# Patient Record
Sex: MALE | Race: WHITE | Employment: OTHER | URBAN - METROPOLITAN AREA
[De-identification: names, ages, dates, MRNs, and addresses within clinical notes are randomized per-mention and may not be internally consistent; named-entity substitution may affect disease eponyms.]

---

## 2017-01-09 ENCOUNTER — OFFICE VISIT (OUTPATIENT)
Dept: UROLOGY | Age: 72
End: 2017-01-09

## 2017-01-09 VITALS
TEMPERATURE: 97.9 F | SYSTOLIC BLOOD PRESSURE: 137 MMHG | DIASTOLIC BLOOD PRESSURE: 70 MMHG | OXYGEN SATURATION: 92 % | HEART RATE: 73 BPM | BODY MASS INDEX: 25.06 KG/M2 | WEIGHT: 185 LBS | HEIGHT: 72 IN

## 2017-01-09 DIAGNOSIS — C67.9 MALIGNANT NEOPLASM OF URINARY BLADDER, UNSPECIFIED SITE (HCC): Primary | ICD-10-CM

## 2017-01-09 NOTE — PROGRESS NOTES
[address]    Chief Complaint   Patient presents with   [facility] Pre-op Exam    Bladder Cancer       History and Physical    The patient is a 67-year-old male  with a history of non-muscle invasive bladder cancer who has had a single distant protocol of BCG 6 instillations and recently had another attempt to do a six-week protocol to be followed by maintenance. The patient developed significant irritative symptoms suggestive of cystitis and a surveillance cystoscopy showed a solitary lesion in the dome of the bladder with a bladder wash of this area showing tumor cells. The patient comes now for excisional biopsy of that area plus random biopsies to assess for any changes of carcinoma in situ. We have yet to decide on whether to attempt another BCG protocol    Past Medical History   Diagnosis Date    Elevated prostate specific antigen (PSA)     Essential hypertension     Hiatal hernia     Nodular prostate      Patient Active Problem List   Diagnosis Code    Nodular prostate 600.1    Elevated prostate specific antigen (PSA) R97.20    Bladder tumor D49.4     Past Surgical History   Procedure Laterality Date    Hx retinal detachment repair      Pr prostate biopsy, needle, saturation sampling      Hx other surgical       MELANOMA EXCISION FROM THE NOSE    Hx urological  2/2016     Cystoscopy-bladder tumor removal     Current Outpatient Prescriptions   Medication Sig Dispense Refill    finasteride (PROSCAR) 5 mg tablet Take 1 Tab by mouth daily. 90 Tab 3    tamsulosin (FLOMAX) 0.4 mg capsule One cap q d pc 90 Cap 3    latanoprost (XALATAN) 0.005 % ophthalmic solution Administer 1 Drop to both eyes nightly.  omeprazole (PRILOSEC) 10 mg capsule Take 10 mg by mouth daily.  simvastatin (ZOCOR) 10 mg tablet Take  by mouth nightly.  lisinopril (PRINIVIL, ZESTRIL) 5 mg tablet Take  by mouth daily.       cephALEXin (KEFLEX) 500 mg capsule One tid aqs directed x 2 d before treatment 6 Cap 0  HYDROcodone-acetaminophen (NORCO)  mg tablet Take 1 Tab by mouth every six (6) hours as needed for Pain. Max Daily Amount: 4 Tabs. 40 Tab 0     No Known Allergies  Social History     Social History    Marital status:      Spouse name: N/A    Number of children: N/A    Years of education: N/A     Occupational History    Not on file. Social History Main Topics    Smoking status: Never Smoker    Smokeless tobacco: Never Used    Alcohol use Yes      Comment: OCASSIONAL-WINE    Drug use: No    Sexual activity: Yes     Other Topics Concern    Not on file     Social History Narrative      Family History   Problem Relation Age of Onset    Hypertension Mother     Cancer Father              Visit Vitals    /70 (BP 1 Location: Left arm, BP Patient Position: Sitting)    Pulse 73    Temp 97.9 °F (36.6 °C) (Oral)    Ht 6' (1.829 m)    Wt 185 lb (83.9 kg)    SpO2 92%    BMI 25.09 kg/m2     Physical        Gen: WDWN adult NAD  Head  : normocephalic,  Normal ROM; eyes without normal pupils, EOMs, no masses;  conjunctiva normal  Neck: normal movement,  no evident mass,  No evident adenopathy, trachea midline,  Lungs clear to auscultation with no rales or ronchi or rubs  Cardiac NSR with no murmur, rub, extra sounds  Abd :bowel sounds normal, no masses, tenderness, organomegaly  Flanks     -    Extremities- no edema, arthritis, deformity, swelling  Psych- oriented, no evident anxiety, no cognitive impairment evident     urine shows trace blood                  Impression/ PLAN  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 We will also give mitomycin-C postprocedural.  The patient wife are again fully counseled regarding the preparation technique convalescence and risks.             This visit exceeded 40 minutes and >50% was counselling  The patient understands the discussion and plan    PLEASE NOTE:      This document has been produced using voice recognition software.   Unrecognized errors in transcription may be present    Nav Cruz MD

## 2017-01-09 NOTE — PROGRESS NOTES
Mr. Juan A Randhawa has a reminder for a \"due or due soon\" health maintenance. I have asked that he contact his primary care provider for follow-up on this health maintenance.

## 2017-01-09 NOTE — MR AVS SNAPSHOT
Visit Information Date & Time Provider Department Dept. Phone Encounter #  
 1/9/2017  1:30 PM Álvaro Brown [facility] [phone] [id number] Your Appointments 1/13/2017  2:45 PM  
Office Visit with Marlin Godwin MD  
[facility] Appt Note: PO TURBT  
 [address] [phone]  
[phone] [address] [address]  
  
    
 2/6/2017  9:15 AM  
PROCEDURE with Marlin Godwin MD  
[facility] Appt Note: BCG 1/2 dose per Dr Pal Plasencia [address] [phone]  
[phone] [address] [facility] [address] Upcoming Health Maintenance Date Due DTaP/Tdap/Td series (1 - Tdap) 4/23/1965 FOBT Q 1 YEAR AGE 50-75 4/23/1994 ZOSTER VACCINE AGE 60> 4/23/2004 GLAUCOMA SCREENING Q2Y 4/23/2009 Pneumococcal 65+ High/Highest Risk (1 of 2 - PCV13) 4/23/2009 MEDICARE YEARLY EXAM 4/23/2009 INFLUENZA AGE 9 TO ADULT 8/1/2016 Allergies as of 1/9/2017  Review Complete On: 1/9/2017 By: Marlin Godwin MD  
 No Known Allergies Current Immunizations  Never Reviewed No immunizations on file. Not reviewed this visit You Were Diagnosed With   
  
 Codes Comments Malignant neoplasm of urinary bladder, unspecified site [facility])    -  Primary ICD-10-CM: C67.9 ICD-9-CM: 188. 9 Vitals BP Pulse Temp Height(growth percentile) Weight(growth percentile) SpO2  
 137/70 (BP 1 Location: Left arm, BP Patient Position: Sitting) 73 97.9 °F (36.6 °C) (Oral) 6' (1.829 m) 185 lb (83.9 kg) 92% BMI Smoking Status 25.09 kg/m2 Never Smoker Vitals History BMI and BSA Data Body Mass Index Body Surface Area 25.09 kg/m 2 2.06 m 2 Preferred Pharmacy Pharmacy Name Phone [facility] [address], [address] [phone] Your Updated Medication List  
  
   
This list is accurate as of: 1/9/17  2:35 PM.  Always use your most recent med list.  
  
  
  
  
 cephALEXin 500 mg capsule Commonly known as:  Radha Iron One tid aqs directed x 2 d before treatment  
  
 finasteride 5 mg tablet Commonly known as:  PROSCAR Take 1 Tab by mouth daily. HYDROcodone-acetaminophen  mg tablet Commonly known as:  Lynnetta Villanueva Take 1 Tab by mouth every six (6) hours as needed for Pain. Max Daily Amount: 4 Tabs.  
  
 latanoprost 0.005 % ophthalmic solution Commonly known as:  Isaias Ferrell Administer 1 Drop to both eyes nightly. lisinopril 5 mg tablet Commonly known as:  Marissa Anderson Take  by mouth daily. PriLOSEC 10 mg capsule Generic drug:  omeprazole Take 10 mg by mouth daily. tamsulosin 0.4 mg capsule Commonly known as:  FLOMAX One cap q d pc ZOCOR 10 mg tablet Generic drug:  simvastatin Take  by mouth nightly. We Performed the Following AMB POC URINALYSIS DIP STICK AUTO W/O MICRO [51642 CPT(R)] Patient Instructions Transurethral Resection of the Prostate (TURP): Before Your Surgery What is transurethral resection of the prostate? Transurethral resection of the prostate (TURP) is surgery to take out a part of your prostate gland. It is done when the prostate gets too large. The prostate gland is a small organ just below a man's bladder. It makes most of the fluid in semen. It surrounds the urethra, the tube that carries urine from the bladder out of the body through the penis. Your doctor will give you medicine to make you sleep or feel relaxed. If you are awake during the surgery, you will get medicine to numb you from the chest down. You will not feel pain. The doctor puts a thin, lighted tube into your urethra.  This is called a resectoscope or scope. It goes in through the opening in your penis. Then the doctor puts small surgical tools through the scope. These are used to remove the part of the prostate that is blocking urine flow. When the doctor is finished, he or she takes out the scope. This surgery may make it easier for you to urinate. You may have better control when you start and stop your urine stream. And you may feel like you get more relief when you urinate. Most men go home from the hospital 1 or 2 days after surgery. You may be able to go back to work or most of your usual routine in 1 to 3 weeks. But for about 6 weeks, you will need to avoid heavy lifting and activities that might put extra pressure on your bladder. Follow-up care is a key part of your treatment and safety. Be sure to make and go to all appointments, and call your doctor if you are having problems. It's also a good idea to know your test results and keep a list of the medicines you take. What happens before surgery? Surgery can be stressful. This information will help you understand what you can expect. And it will help you safely prepare for your surgery. Preparing for surgery · Understand exactly what surgery is planned, along with the risks, benefits, and other options. · Tell your doctors ALL the medicines, vitamins, supplements, and herbal remedies you take. Some of these can increase the risk of bleeding or interact with anesthesia. · If you take blood thinners, such as warfarin (Coumadin), clopidogrel (Plavix), or aspirin, be sure to talk to your doctor. He or she will tell you if you should stop taking these medicines before your surgery. Make sure that you understand exactly what your doctor wants you to do. · Your doctor will tell you which medicines to take or stop before your surgery. You may need to stop taking certain medicines a week or more before surgery. So talk to your doctor as soon as you can. · If you have an advance directive, let your doctor know. It may include a living will and a durable power of  for health care. Bring a copy to the hospital. If you don't have one, you may want to prepare one. It lets your doctor and loved ones know your health care wishes. Doctors advise that everyone prepare these papers before any type of surgery or procedure. · You may need to empty your bowels with an enema or laxative. Your doctor will tell you how to do this. What happens on the day of surgery? · Follow the instructions exactly about when to stop eating and drinking. If you don't, your surgery may be canceled. If your doctor told you to take your medicines on the day of surgery, take them with only a sip of water. · Take a bath or shower before you come in for your surgery. Do not apply lotions, perfumes, deodorants, or nail polish. · Do not shave the surgical site yourself. · Take off all jewelry and piercings. And take out contact lenses, if you wear them. At the hospital or surgery center · Bring a picture ID. · The area for surgery is often marked to make sure there are no errors. · You will be kept comfortable and safe by your anesthesia provider. The anesthesia may make you sleep. Or it may just numb the area being worked on. · The surgery will take 1 to 2 hours. Going home · Be sure you have someone to drive you home. Anesthesia and pain medicine make it unsafe for you to drive. · You will be given more specific instructions about recovering from your surgery. They will cover things like diet, wound care, follow-up care, driving, and getting back to your normal routine. · You may go home with a urinary catheter in place. A urinary catheter is a flexible plastic tube used to drain urine from your bladder when you cannot urinate on your own. Your doctor will give you instructions about how to care for your catheter and when it can be removed. When should you call your doctor? · You have questions or concerns. · You don't understand how to prepare for your surgery. · You become ill before the surgery (such as fever, flu, or a cold). · You need to reschedule or have changed your mind about having the surgery. Where can you learn more? Go to http://good-jerod.info/. Enter M968 in the search box to learn more about \"Transurethral Resection of the Prostate (TURP): Before Your Surgery. \" Current as of: May 24, 2016 Content Version: 11.1 © 5490-5122 [employer]. Care instructions adapted under license by [employer] (which disclaims liability or warranty for this information). If you have questions about a medical condition or this instruction, always ask your healthcare professional. [place] [address] any warranty or liability for your use of this information. Bladder Cancer: Care Instructions Your Care Instructions Bladder cancer occurs when abnormal cells grow out of control in the bladder. It usually can be cured when it is found early. It is more common in older people. Treatment may include surgery to remove part of the bladder. If the tumor is large, the entire bladder may be removed. You may also have radiation or chemotherapy to kill the cancer cells. Sometimes people get treatment with medicines that help the body's natural defenses, or immune system, fight the cancer. Finding out that you have cancer is scary. You may feel many emotions and may need some help coping. Seek out family, friends, and counselors for support. You also can do things at home to make yourself feel better while you go through treatment. Call the "[employer] (6-263.964.4604) or visit its website at [address] [employer] for more information. Follow-up care is a key part of your treatment and safety.  Be sure to make and go to all appointments, and call your doctor if you are having problems. It's also a good idea to know your test results and keep a list of the medicines you take. How can you care for yourself at home? · Take your medicines exactly as prescribed. Call your doctor if you think you are having a problem with your medicine. You may get medicine for nausea and vomiting if you have these side effects. · Eat healthy food. If you are not hungry, try to eat food that has protein and extra calories to keep up your strength and prevent weight loss. Drink liquid meal replacements for extra calories and protein. Try to eat your main meal early. · Get some physical activity every day, but do not get too tired. Keep doing the hobbies you enjoy as your energy allows. · Take steps to control your stress and workload. Learn relaxation techniques. ¨ Share your feelings. Stress and tension affect our emotions. By expressing your feelings to others, you may be able to understand and cope with them. ¨ Consider joining a support group. Talking about a problem with your spouse, a good friend, or other people with similar problems is a good way to reduce tension and stress. ¨ Express yourself through art. Try writing, dance, art, or crafts to relieve tension. Some dance, writing, or art groups may be available just for people who have cancer. ¨ Be kind to your body and mind. Getting enough sleep, eating a healthy diet, and taking time to do things you enjoy can contribute to an overall feeling of balance in your life and help reduce stress. ¨ Get help if you need it. Discuss your concerns with your doctor or counselor. · If you are vomiting or have diarrhea: ¨ Drink plenty of fluids (enough so that your urine is light yellow or clear like water) to prevent dehydration. Choose water and other caffeine-free clear liquids. If you have kidney, heart, or liver disease and have to limit fluids, talk with your doctor before you increase the amount of fluids you drink. ¨ When you are able to eat, try clear soups, mild foods, and liquids until all symptoms are gone for 12 to 48 hours. Other good choices include dry toast, crackers, cooked cereal, and gelatin dessert, such as Jell-O. 
· Take care of your urinary tract to prevent problems such as infection, which can be caused by bladder cancer and its treatment. Limit drinks with caffeine, drink plenty of fluids, and urinate every 3 to 4 hours. · If you have not already done so, prepare a list of advance directives. Advance directives are instructions to your doctor and family members about what kind of care you want if you become unable to speak for yourself. When should you call for help? Call 911 anytime you think you may need emergency care. For example, call if: 
· You passed out (lost consciousness). · You have severe belly pain. Call your doctor now or seek immediate medical care if: · Vomiting lasts longer than 24 hours and you are not able to keep down fluids. · You have symptoms of a urinary infection. For example: ¨ You have blood or pus in your urine. ¨ You have pain in your back just below your rib cage. This is called flank pain. ¨ You have a fever, chills, or body aches. ¨ It hurts to urinate. ¨ You have groin or belly pain. Watch closely for changes in your health, and be sure to contact your doctor if: 
· You are not able to eat well and are losing weight. · You feel more tired than usual. 
Where can you learn more? Go to http://good-jerod.info/. Enter M796 in the search box to learn more about \"Bladder Cancer: Care Instructions. \" Current as of: July 26, 2016 Content Version: 11.1 © 4525-4381 [employer]. Care instructions adapted under license by [employer] (which disclaims liability or warranty for this information).  If you have questions about a medical condition or this instruction, always ask your healthcare professional. [employer] disclaims any warranty or liability for your use of this information. Patient Instructions History Please provide this summary of care documentation to your next provider. Your primary care clinician is listed as Celio Guzman. If you have any questions after today's visit, please call [phone].

## 2017-01-09 NOTE — PATIENT INSTRUCTIONS
Transurethral Resection of the Prostate (TURP): Before Your Surgery  What is transurethral resection of the prostate? Transurethral resection of the prostate (TURP) is surgery to take out a part of your prostate gland. It is done when the prostate gets too large. The prostate gland is a small organ just below a man's bladder. It makes most of the fluid in semen. It surrounds the urethra, the tube that carries urine from the bladder out of the body through the penis. Your doctor will give you medicine to make you sleep or feel relaxed. If you are awake during the surgery, you will get medicine to numb you from the chest down. You will not feel pain. The doctor puts a thin, lighted tube into your urethra. This is called a resectoscope or scope. It goes in through the opening in your penis. Then the doctor puts small surgical tools through the scope. These are used to remove the part of the prostate that is blocking urine flow. When the doctor is finished, he or she takes out the scope. This surgery may make it easier for you to urinate. You may have better control when you start and stop your urine stream. And you may feel like you get more relief when you urinate. Most men go home from the hospital 1 or 2 days after surgery. You may be able to go back to work or most of your usual routine in 1 to 3 weeks. But for about 6 weeks, you will need to avoid heavy lifting and activities that might put extra pressure on your bladder. Follow-up care is a key part of your treatment and safety. Be sure to make and go to all appointments, and call your doctor if you are having problems. It's also a good idea to know your test results and keep a list of the medicines you take. What happens before surgery? Surgery can be stressful. This information will help you understand what you can expect. And it will help you safely prepare for your surgery.   Preparing for surgery  · Understand exactly what surgery is planned, along with the risks, benefits, and other options. · Tell your doctors ALL the medicines, vitamins, supplements, and herbal remedies you take. Some of these can increase the risk of bleeding or interact with anesthesia. · If you take blood thinners, such as warfarin (Coumadin), clopidogrel (Plavix), or aspirin, be sure to talk to your doctor. He or she will tell you if you should stop taking these medicines before your surgery. Make sure that you understand exactly what your doctor wants you to do. · Your doctor will tell you which medicines to take or stop before your surgery. You may need to stop taking certain medicines a week or more before surgery. So talk to your doctor as soon as you can. · If you have an advance directive, let your doctor know. It may include a living will and a durable power of  for health care. Bring a copy to the hospital. If you don't have one, you may want to prepare one. It lets your doctor and loved ones know your health care wishes. Doctors advise that everyone prepare these papers before any type of surgery or procedure. · You may need to empty your bowels with an enema or laxative. Your doctor will tell you how to do this. What happens on the day of surgery? · Follow the instructions exactly about when to stop eating and drinking. If you don't, your surgery may be canceled. If your doctor told you to take your medicines on the day of surgery, take them with only a sip of water. · Take a bath or shower before you come in for your surgery. Do not apply lotions, perfumes, deodorants, or nail polish. · Do not shave the surgical site yourself. · Take off all jewelry and piercings. And take out contact lenses, if you wear them. At the hospital or surgery center  · Bring a picture ID. · The area for surgery is often marked to make sure there are no errors. · You will be kept comfortable and safe by your anesthesia provider. The anesthesia may make you sleep.  Or it may just numb the area being worked on. · The surgery will take 1 to 2 hours. Going home  · Be sure you have someone to drive you home. Anesthesia and pain medicine make it unsafe for you to drive. · You will be given more specific instructions about recovering from your surgery. They will cover things like diet, wound care, follow-up care, driving, and getting back to your normal routine. · You may go home with a urinary catheter in place. A urinary catheter is a flexible plastic tube used to drain urine from your bladder when you cannot urinate on your own. Your doctor will give you instructions about how to care for your catheter and when it can be removed. When should you call your doctor? · You have questions or concerns. · You don't understand how to prepare for your surgery. · You become ill before the surgery (such as fever, flu, or a cold). · You need to reschedule or have changed your mind about having the surgery. Where can you learn more? Go to http://good-jerod.info/. Enter I386 in the search box to learn more about \"Transurethral Resection of the Prostate (TURP): Before Your Surgery. \"  Current as of: May 24, 2016  Content Version: 11.1  © 0734-7949 "[employer]. Care instructions adapted under license by [employer] (which disclaims liability or warranty for this information). If you have questions about a medical condition or this instruction, always ask your healthcare professional. [place] [address] any warranty or liability for your use of this information. Bladder Cancer: Care Instructions  Your Care Instructions  Bladder cancer occurs when abnormal cells grow out of control in the bladder. It usually can be cured when it is found early. It is more common in older people. Treatment may include surgery to remove part of the bladder. If the tumor is large, the entire bladder may be removed.  You may also have radiation or chemotherapy to kill the cancer cells. Sometimes people get treatment with medicines that help the body's natural defenses, or immune system, fight the cancer. Finding out that you have cancer is scary. You may feel many emotions and may need some help coping. Seek out family, friends, and counselors for support. You also can do things at home to make yourself feel better while you go through treatment. Call the [employer] (2-827.936.9532) or visit its website at [address] for more information. Follow-up care is a key part of your treatment and safety. Be sure to make and go to all appointments, and call your doctor if you are having problems. It's also a good idea to know your test results and keep a list of the medicines you take. How can you care for yourself at home? · Take your medicines exactly as prescribed. Call your doctor if you think you are having a problem with your medicine. You may get medicine for nausea and vomiting if you have these side effects. · Eat healthy food. If you are not hungry, try to eat food that has protein and extra calories to keep up your strength and prevent weight loss. Drink liquid meal replacements for extra calories and protein. Try to eat your main meal early. · Get some physical activity every day, but do not get too tired. Keep doing the hobbies you enjoy as your energy allows. · Take steps to control your stress and workload. Learn relaxation techniques. ¨ Share your feelings. Stress and tension affect our emotions. By expressing your feelings to others, you may be able to understand and cope with them. ¨ Consider joining a support group. Talking about a problem with your spouse, a good friend, or other people with similar problems is a good way to reduce tension and stress. ¨ Express yourself through art. Try writing, dance, art, or crafts to relieve tension.  Some dance, writing, or art groups may be available just for people who have cancer. ¨ Be kind to your body and mind. Getting enough sleep, eating a healthy diet, and taking time to do things you enjoy can contribute to an overall feeling of balance in your life and help reduce stress. ¨ Get help if you need it. Discuss your concerns with your doctor or counselor. · If you are vomiting or have diarrhea:  ¨ Drink plenty of fluids (enough so that your urine is light yellow or clear like water) to prevent dehydration. Choose water and other caffeine-free clear liquids. If you have kidney, heart, or liver disease and have to limit fluids, talk with your doctor before you increase the amount of fluids you drink. ¨ When you are able to eat, try clear soups, mild foods, and liquids until all symptoms are gone for 12 to 48 hours. Other good choices include dry toast, crackers, cooked cereal, and gelatin dessert, such as Jell-O.  · Take care of your urinary tract to prevent problems such as infection, which can be caused by bladder cancer and its treatment. Limit drinks with caffeine, drink plenty of fluids, and urinate every 3 to 4 hours. · If you have not already done so, prepare a list of advance directives. Advance directives are instructions to your doctor and family members about what kind of care you want if you become unable to speak for yourself. When should you call for help? Call 911 anytime you think you may need emergency care. For example, call if:  · You passed out (lost consciousness). · You have severe belly pain. Call your doctor now or seek immediate medical care if:  · Vomiting lasts longer than 24 hours and you are not able to keep down fluids. · You have symptoms of a urinary infection. For example:  ¨ You have blood or pus in your urine. ¨ You have pain in your back just below your rib cage. This is called flank pain. ¨ You have a fever, chills, or body aches. ¨ It hurts to urinate. ¨ You have groin or belly pain.   Watch closely for changes in your health, and be sure to contact your doctor if:  · You are not able to eat well and are losing weight. · You feel more tired than usual.  Where can you learn more? Go to http://good-jerod.info/. Enter M796 in the search box to learn more about \"Bladder Cancer: Care Instructions. \"  Current as of: July 26, 2016  Content Version: 11.1  © 4428-5646 [employer]. Care instructions adapted under license by [employer] (which disclaims liability or warranty for this information). If you have questions about a medical condition or this instruction, always ask your healthcare professional. [place] [address] any warranty or liability for your use of this information.

## 2017-01-10 ENCOUNTER — HOSPITAL ENCOUNTER (OUTPATIENT)
Dept: LAB | Age: 72
Discharge: HOME | End: 2017-01-10
Payer: MEDICARE

## 2017-01-10 ENCOUNTER — ANESTHESIA EVENT (OUTPATIENT)
Dept: SURGERY | Age: 72
End: 2017-01-10
Payer: MEDICARE

## 2017-01-10 LAB
ALBUMIN SERPL BCP-MCNC: 3.8 G/DL (ref 3.4–5)
ALBUMIN/GLOB SERPL: 1.1 {RATIO} (ref 0.8–1.7)
ALP SERPL-CCNC: 87 U/L (ref 45–117)
ALT SERPL-CCNC: 29 U/L (ref 16–61)
ANION GAP BLD CALC-SCNC: 8 MMOL/L (ref 3–18)
AST SERPL W P-5'-P-CCNC: 19 U/L (ref 15–37)
BILIRUB SERPL-MCNC: 0.4 MG/DL (ref 0.2–1)
BILIRUB UR QL STRIP: NEGATIVE
BUN SERPL-MCNC: 21 MG/DL (ref 7–18)
BUN/CREAT SERPL: 16 (ref 12–20)
CALCIUM SERPL-MCNC: 9.2 MG/DL (ref 8.5–10.1)
CHLORIDE SERPL-SCNC: 107 MMOL/L (ref 100–108)
CO2 SERPL-SCNC: 27 MMOL/L (ref 21–32)
CREAT SERPL-MCNC: 1.34 MG/DL (ref 0.6–1.3)
ERYTHROCYTE [DISTWIDTH] IN BLOOD BY AUTOMATED COUNT: 13.5 % (ref 11.6–14.5)
GLOBULIN SER CALC-MCNC: 3.6 G/DL (ref 2–4)
GLUCOSE SERPL-MCNC: 74 MG/DL (ref 74–99)
GLUCOSE UR-MCNC: NEGATIVE MG/DL
HCT VFR BLD AUTO: 44.5 % (ref 36–48)
HGB BLD-MCNC: 14.5 G/DL (ref 13–16)
KETONES P FAST UR STRIP-MCNC: NEGATIVE MG/DL
MCH RBC QN AUTO: 30.5 PG (ref 24–34)
MCHC RBC AUTO-ENTMCNC: 32.6 G/DL (ref 31–37)
MCV RBC AUTO: 93.7 FL (ref 74–97)
PH UR STRIP: 5 [PH] (ref 4.6–8)
PLATELET # BLD AUTO: 183 K/UL (ref 135–420)
PMV BLD AUTO: 11.4 FL (ref 9.2–11.8)
POTASSIUM SERPL-SCNC: 4.1 MMOL/L (ref 3.5–5.5)
PROT SERPL-MCNC: 7.4 G/DL (ref 6.4–8.2)
PROT UR QL STRIP: NEGATIVE MG/DL
RBC # BLD AUTO: 4.75 M/UL (ref 4.7–5.5)
SODIUM SERPL-SCNC: 142 MMOL/L (ref 136–145)
SP GR UR STRIP: 1.02 (ref 1–1.03)
UA UROBILINOGEN AMB POC: NORMAL (ref 0.2–1)
URINALYSIS CLARITY POC: CLEAR
URINALYSIS COLOR POC: YELLOW
URINE BLOOD POC: NORMAL
URINE LEUKOCYTES POC: NEGATIVE
URINE NITRITES POC: NEGATIVE
WBC # BLD AUTO: 7.7 K/UL (ref 4.6–13.2)

## 2017-01-10 PROCEDURE — 85027 COMPLETE CBC AUTOMATED: CPT

## 2017-01-10 PROCEDURE — 80053 COMPREHEN METABOLIC PANEL: CPT

## 2017-01-10 PROCEDURE — 36415 COLL VENOUS BLD VENIPUNCTURE: CPT

## 2017-01-10 RX ORDER — SODIUM CHLORIDE 9 MG/ML
100 INJECTION, SOLUTION INTRAVENOUS CONTINUOUS
Status: CANCELLED | OUTPATIENT
Start: 2017-01-10 | End: 2017-01-11

## 2017-01-11 ENCOUNTER — HOSPITAL ENCOUNTER (OUTPATIENT)
Age: 72
Setting detail: OUTPATIENT SURGERY
Discharge: HOME | End: 2017-01-11
Attending: UROLOGY | Admitting: UROLOGY
Payer: MEDICARE

## 2017-01-11 ENCOUNTER — SURGERY (OUTPATIENT)
Age: 72
End: 2017-01-11

## 2017-01-11 ENCOUNTER — ANESTHESIA (OUTPATIENT)
Dept: SURGERY | Age: 72
End: 2017-01-11
Payer: MEDICARE

## 2017-01-11 VITALS
HEIGHT: 72 IN | WEIGHT: 183 LBS | HEART RATE: 62 BPM | TEMPERATURE: 97.9 F | RESPIRATION RATE: 14 BRPM | BODY MASS INDEX: 24.79 KG/M2 | SYSTOLIC BLOOD PRESSURE: 131 MMHG | OXYGEN SATURATION: 99 % | DIASTOLIC BLOOD PRESSURE: 67 MMHG

## 2017-01-11 PROCEDURE — 77030029290 HC ELECTRD LP CUT OCOA -F: Performed by: UROLOGY

## 2017-01-11 PROCEDURE — 77030010509 HC AIRWY LMA MSK TELE -A: Performed by: ANESTHESIOLOGY

## 2017-01-11 PROCEDURE — 74011250636 HC RX REV CODE- 250/636: Performed by: NURSE ANESTHETIST, CERTIFIED REGISTERED

## 2017-01-11 PROCEDURE — 74011000250 HC RX REV CODE- 250

## 2017-01-11 PROCEDURE — 76060000032 HC ANESTHESIA 0.5 TO 1 HR: Performed by: UROLOGY

## 2017-01-11 PROCEDURE — 77030032490 HC SLV COMPR SCD KNE COVD -B: Performed by: UROLOGY

## 2017-01-11 PROCEDURE — 74011250636 HC RX REV CODE- 250/636: Performed by: UROLOGY

## 2017-01-11 PROCEDURE — 74011250637 HC RX REV CODE- 250/637: Performed by: NURSE ANESTHETIST, CERTIFIED REGISTERED

## 2017-01-11 PROCEDURE — 74011000258 HC RX REV CODE- 258: Performed by: UROLOGY

## 2017-01-11 PROCEDURE — 76210000021 HC REC RM PH II 0.5 TO 1 HR: Performed by: UROLOGY

## 2017-01-11 PROCEDURE — 88305 TISSUE EXAM BY PATHOLOGIST: CPT | Performed by: UROLOGY

## 2017-01-11 PROCEDURE — 74011250636 HC RX REV CODE- 250/636

## 2017-01-11 PROCEDURE — 77030020782 HC GWN BAIR PAWS FLX 3M -B: Performed by: UROLOGY

## 2017-01-11 PROCEDURE — 77030018836 HC SOL IRR NACL ICUM -A: Performed by: UROLOGY

## 2017-01-11 PROCEDURE — 76210000016 HC OR PH I REC 1 TO 1.5 HR: Performed by: UROLOGY

## 2017-01-11 PROCEDURE — 77030019927 HC TBNG IRR CYSTO BAXT -A: Performed by: UROLOGY

## 2017-01-11 PROCEDURE — 88307 TISSUE EXAM BY PATHOLOGIST: CPT | Performed by: UROLOGY

## 2017-01-11 PROCEDURE — 76010000138 HC OR TIME 0.5 TO 1 HR: Performed by: UROLOGY

## 2017-01-11 PROCEDURE — 77030020015 HC EVAC BLDR UROVAC BSC -B: Performed by: UROLOGY

## 2017-01-11 PROCEDURE — 77030005520 HC CATH URETH FOL38 BARD -A: Performed by: UROLOGY

## 2017-01-11 RX ORDER — SODIUM CHLORIDE 0.9 % (FLUSH) 0.9 %
5-10 SYRINGE (ML) INJECTION AS NEEDED
Status: DISCONTINUED | OUTPATIENT
Start: 2017-01-11 | End: 2017-01-11 | Stop reason: HOSPADM

## 2017-01-11 RX ORDER — SODIUM CHLORIDE 0.9 % (FLUSH) 0.9 %
5-10 SYRINGE (ML) INJECTION EVERY 8 HOURS
Status: DISCONTINUED | OUTPATIENT
Start: 2017-01-11 | End: 2017-01-11 | Stop reason: HOSPADM

## 2017-01-11 RX ORDER — LIDOCAINE HYDROCHLORIDE 20 MG/ML
INJECTION, SOLUTION EPIDURAL; INFILTRATION; INTRACAUDAL; PERINEURAL AS NEEDED
Status: DISCONTINUED | OUTPATIENT
Start: 2017-01-11 | End: 2017-01-11 | Stop reason: HOSPADM

## 2017-01-11 RX ORDER — ONDANSETRON 2 MG/ML
4 INJECTION INTRAMUSCULAR; INTRAVENOUS ONCE
Status: COMPLETED | OUTPATIENT
Start: 2017-01-11 | End: 2017-01-11

## 2017-01-11 RX ORDER — ONDANSETRON 2 MG/ML
INJECTION INTRAMUSCULAR; INTRAVENOUS AS NEEDED
Status: DISCONTINUED | OUTPATIENT
Start: 2017-01-11 | End: 2017-01-11 | Stop reason: HOSPADM

## 2017-01-11 RX ORDER — MAGNESIUM SULFATE 100 %
4 CRYSTALS MISCELLANEOUS AS NEEDED
Status: DISCONTINUED | OUTPATIENT
Start: 2017-01-11 | End: 2017-01-11 | Stop reason: HOSPADM

## 2017-01-11 RX ORDER — MIDAZOLAM HYDROCHLORIDE 1 MG/ML
INJECTION, SOLUTION INTRAMUSCULAR; INTRAVENOUS AS NEEDED
Status: DISCONTINUED | OUTPATIENT
Start: 2017-01-11 | End: 2017-01-11 | Stop reason: HOSPADM

## 2017-01-11 RX ORDER — SODIUM CHLORIDE, SODIUM LACTATE, POTASSIUM CHLORIDE, CALCIUM CHLORIDE 600; 310; 30; 20 MG/100ML; MG/100ML; MG/100ML; MG/100ML
75 INJECTION, SOLUTION INTRAVENOUS CONTINUOUS
Status: DISCONTINUED | OUTPATIENT
Start: 2017-01-11 | End: 2017-01-11 | Stop reason: HOSPADM

## 2017-01-11 RX ORDER — DEXTROSE 50 % IN WATER (D50W) INTRAVENOUS SYRINGE
25-50 AS NEEDED
Status: DISCONTINUED | OUTPATIENT
Start: 2017-01-11 | End: 2017-01-11 | Stop reason: HOSPADM

## 2017-01-11 RX ORDER — FAMOTIDINE 20 MG/1
20 TABLET, FILM COATED ORAL ONCE
Status: COMPLETED | OUTPATIENT
Start: 2017-01-11 | End: 2017-01-11

## 2017-01-11 RX ORDER — CEFAZOLIN SODIUM 2 G/50ML
2 SOLUTION INTRAVENOUS ONCE
Status: COMPLETED | OUTPATIENT
Start: 2017-01-11 | End: 2017-01-11

## 2017-01-11 RX ORDER — PROPOFOL 10 MG/ML
INJECTION, EMULSION INTRAVENOUS AS NEEDED
Status: DISCONTINUED | OUTPATIENT
Start: 2017-01-11 | End: 2017-01-11 | Stop reason: HOSPADM

## 2017-01-11 RX ORDER — FENTANYL CITRATE 50 UG/ML
INJECTION, SOLUTION INTRAMUSCULAR; INTRAVENOUS AS NEEDED
Status: DISCONTINUED | OUTPATIENT
Start: 2017-01-11 | End: 2017-01-11 | Stop reason: HOSPADM

## 2017-01-11 RX ORDER — SODIUM CHLORIDE 9 MG/ML
100 INJECTION, SOLUTION INTRAVENOUS CONTINUOUS
Status: DISCONTINUED | OUTPATIENT
Start: 2017-01-11 | End: 2017-01-11 | Stop reason: HOSPADM

## 2017-01-11 RX ORDER — HYDROMORPHONE HYDROCHLORIDE 2 MG/ML
0.5 INJECTION, SOLUTION INTRAMUSCULAR; INTRAVENOUS; SUBCUTANEOUS AS NEEDED
Status: DISCONTINUED | OUTPATIENT
Start: 2017-01-11 | End: 2017-01-11 | Stop reason: HOSPADM

## 2017-01-11 RX ADMIN — MIDAZOLAM HYDROCHLORIDE 2 MG: 1 INJECTION, SOLUTION INTRAMUSCULAR; INTRAVENOUS at 11:52

## 2017-01-11 RX ADMIN — CEFAZOLIN SODIUM 2 G: 2 SOLUTION INTRAVENOUS at 11:52

## 2017-01-11 RX ADMIN — ONDANSETRON 4 MG: 2 INJECTION INTRAMUSCULAR; INTRAVENOUS at 13:21

## 2017-01-11 RX ADMIN — ONDANSETRON 4 MG: 2 INJECTION INTRAMUSCULAR; INTRAVENOUS at 12:12

## 2017-01-11 RX ADMIN — LIDOCAINE HYDROCHLORIDE 80 MG: 20 INJECTION, SOLUTION EPIDURAL; INFILTRATION; INTRACAUDAL; PERINEURAL at 11:58

## 2017-01-11 RX ADMIN — FAMOTIDINE 20 MG: 20 TABLET ORAL at 08:31

## 2017-01-11 RX ADMIN — PROPOFOL 150 MG: 10 INJECTION, EMULSION INTRAVENOUS at 11:58

## 2017-01-11 RX ADMIN — FENTANYL CITRATE 50 MCG: 50 INJECTION, SOLUTION INTRAMUSCULAR; INTRAVENOUS at 12:05

## 2017-01-11 RX ADMIN — FENTANYL CITRATE 50 MCG: 50 INJECTION, SOLUTION INTRAMUSCULAR; INTRAVENOUS at 12:00

## 2017-01-11 RX ADMIN — SODIUM CHLORIDE, SODIUM LACTATE, POTASSIUM CHLORIDE, AND CALCIUM CHLORIDE 75 ML/HR: 600; 310; 30; 20 INJECTION, SOLUTION INTRAVENOUS at 08:32

## 2017-01-11 RX ADMIN — HYDROMORPHONE HYDROCHLORIDE 0.5 MG: 2 INJECTION, SOLUTION INTRAMUSCULAR; INTRAVENOUS; SUBCUTANEOUS at 13:30

## 2017-01-11 RX ADMIN — HYDROMORPHONE HYDROCHLORIDE 0.5 MG: 2 INJECTION, SOLUTION INTRAMUSCULAR; INTRAVENOUS; SUBCUTANEOUS at 13:20

## 2017-01-11 RX ADMIN — MITOMYCIN: 20 INJECTION, POWDER, LYOPHILIZED, FOR SOLUTION INTRAVENOUS at 12:34

## 2017-01-11 NOTE — BRIEF OP NOTE
BRIEF OPERATIVE NOTE    Date of Procedure: 1/11/2017   Preoperative Diagnosis: Malignant neoplasm of urinary bladder, unspecified site ([facility] [C67.9]  Postoperative Diagnosis: Malignant neoplasm of urinary bladder, unspecified site ([facility] [C67.9]    Procedure(s):  CYSTOSCOPY TRANSURETHRAL RESECTION OF BLADDER TUMOR  Surgeon(s) and Role:     * Agatha Brown MD - Primary            Surgical Staff:  Circ-1: Nicolas Acuña RN  Circ-Relief: Graham Russo RN  Scrub Tech-1: Sallyann Closs  Event Time In   Incision Start 1210   Incision Close      Anesthesia: General   Estimated Blood Loss: 5cc  Specimens:   ID Type Source Tests Collected by Time Destination   1 : anterior bladder tumor Preservative Bladder  Agatha Brown MD 1/11/2017 1221 Pathology   2 : Trigone Preservative Bladder  Agatha Brown MD 1/11/2017 1228 Pathology   3 : left side bladder Preservative Bladder  Agatha Brown MD 1/11/2017 1228 Pathology   4 : right side bladder Preservative Bladder  Agatha Brown MD 1/11/2017 1233 Pathology      Findings: small anterior dome tumor  Complications:none *  Implants: * No implants in log *

## 2017-01-11 NOTE — ANESTHESIA PREPROCEDURE EVALUATION
Anesthetic History   No history of anesthetic complications            Review of Systems / Medical History  Patient summary reviewed and pertinent labs reviewed    Pulmonary  Within defined limits                 Neuro/Psych   Within defined limits           Cardiovascular    Hypertension              Exercise tolerance: >4 METS     GI/Hepatic/Renal  Within defined limits              Endo/Other  Within defined limits           Other Findings   Comments: Current Smoker? NO       Elective Surgery? Yes       Abstained from smoking 24 hours prior to anesthesia? N/A    Risk Factors for Postoperative nausea/vomiting:       History of postoperative nausea/vomiting? NO       Female? NO       Motion sickness? NO       Intended opioid administration for postoperative analgesia?   NO           Physical Exam    Airway  Mallampati: II  TM Distance: 4 - 6 cm  Neck ROM: normal range of motion   Mouth opening: Normal     Cardiovascular  Regular rate and rhythm,  S1 and S2 normal,  no murmur, click, rub, or gallop             Dental  No notable dental hx       Pulmonary  Breath sounds clear to auscultation               Abdominal  GI exam deferred       Other Findings            Anesthetic Plan    ASA: 2  Anesthesia type: general          Induction: Intravenous  Anesthetic plan and risks discussed with: Patient

## 2017-01-11 NOTE — IP AVS SNAPSHOT
[address] [place] [id number] [address] Patient: Johanna Jenkins MRN: [id number] You are allergic to the following No active allergies Recent Documentation Height Weight BMI Smoking Status 1.829 m 83 kg 24.82 kg/m2 Never Smoker Emergency Contacts Name Discharge Info Relation Home Work Mobile Darlene Laureano DISCHARGE CAREGIVER [3] Spouse [3] [phone] About your hospitalization You were admitted on:  January 11, 2017 You last received care in the:  [facility] You were discharged on:  January 11, 2017 Unit phone number:  [phone] Why you were hospitalized Your primary diagnosis was:  Not on File Providers Seen During Your Hospitalizations Provider Role Specialty Primary office phone Cheko Valerio MD Attending Provider Urology [phone] Your Primary Care Physician (PCP) Primary Care Physician Office Phone Office Fax Latonia Murray, [address] [place] [address] [phone] Follow-up Information Follow up With Details Comments Contact Info Iván Rodriguez MD   [facility] [address] [address] 
[phone] Cheko Valerio MD  Go to follow up as scheduled [address] [facility] [address] [address] 
[phone] Your Appointments Friday January 13, 2017  2:45 PM EST Office Visit with Cheko Valerio MD  
[facility] [address] [place] [address]) [address] Mike A [address] [address]  
[phone] Monday February 06, 2017  9:15 AM EST PROCEDURE with Cheko Valerio MD  
[facility] [address] [place] [address]) [address] Mike A [address] [address]  
[phone] Current Discharge Medication List  
  
 ASK your doctor about these medications Dose & Instructions Dispensing Information Comments Morning Noon Evening Bedtime  
 cephALEXin 500 mg capsule Commonly known as:  Yanna Officer Your next dose is: Today, Tomorrow Other:  _________ One tid aqs directed x 2 d before treatment Quantity:  6 Cap Refills:  0  
     
   
   
   
  
 finasteride 5 mg tablet Commonly known as:  PROSCAR Your next dose is: Today, Tomorrow Other:  _________ Dose:  5 mg Take 1 Tab by mouth daily. Quantity:  90 Tab Refills:  3 HYDROcodone-acetaminophen  mg tablet Commonly known as:  Farnaz Bao Your next dose is: Today, Tomorrow Other:  _________ Dose:  1 Tab Take 1 Tab by mouth every six (6) hours as needed for Pain. Max Daily Amount: 4 Tabs. Quantity:  40 Tab Refills:  0  
     
   
   
   
  
 latanoprost 0.005 % ophthalmic solution Commonly known as:  Verta Piety Your next dose is: Today, Tomorrow Other:  _________ Dose:  1 Drop Administer 1 Drop to left eye nightly. Refills:  0  
     
   
   
   
  
 lisinopril 5 mg tablet Commonly known as:  Alli Wetzel Your next dose is: Today, Tomorrow Other:  _________ Take  by mouth daily. Refills:  0 PriLOSEC 10 mg capsule Generic drug:  omeprazole Your next dose is: Today, Tomorrow Other:  _________ Dose:  10 mg Take 10 mg by mouth daily. Refills:  0  
     
   
   
   
  
 tamsulosin 0.4 mg capsule Commonly known as:  FLOMAX Your next dose is: Today, Tomorrow Other:  _________ One cap q d pc Quantity:  90 Cap Refills:  3 ZOCOR 10 mg tablet Generic drug:  simvastatin Your next dose is: Today, Tomorrow Other:  _________ Take  by mouth nightly. Refills:  0 Discharge Instructions Must pee by 9:30pm this evening. If unable must return to the nearest emergency room. Transurethral Resection for Bladder Cancer: What to Expect at Home Your Recovery You have had a transurethral resection (TUR) of the bladder. Your doctor removed cancerous tissue. You may have a small tube called a catheter in your urethra to help stop bleeding and to prevent blockage of the urethra. When the bleeding has stopped, the tube is removed. You may need to stay in the hospital 1 to 4 days. You may feel the need to urinate frequently for a while after the surgery, but this should improve with time. It may burn when you urinate. Drink lots of fluids to help with the burning. Your urine also may look pink for up to 2 to 3 weeks after surgery. This is because there may be blood in it. You may have to avoid strenuous activity and heavy lifting for about 3 weeks after TUR. This care sheet gives you a general idea about how long it will take for you to recover. But each person recovers at a different pace. Follow the steps below to feel better as quickly as possible. How can you care for yourself at home? Activity · Rest when you feel tired. Getting enough sleep will help you recover. · Try to walk each day. Start by walking a little more than you did the day before. Bit by bit, increase the amount you walk. Walking boosts blood flow and helps prevent pneumonia and constipation. · Avoid strenuous activities, such as bicycle riding, jogging, weight lifting, or aerobic exercise, for about 3 weeks, or until your doctor says it is okay. · For about 3 weeks, avoid lifting anything that would make you strain. This may include heavy grocery bags and milk containers, a heavy briefcase or backpack, cat litter or dog food bags, a vacuum , or a child. · Ask your doctor when you can drive again. · You may shower and take baths when your doctor says it is okay. · Ask your doctor when it is okay for you to have sex. Diet · You can eat your normal diet. If your stomach is upset, try bland, low-fat foods like plain rice, broiled chicken, toast, and yogurt. · Drink plenty of fluids (unless your doctor tells you not to). Medicines · Your doctor will tell you if and when you can restart your medicines. He or she will also give you instructions about taking any new medicines. · If you take blood thinners, such as warfarin (Coumadin), clopidogrel (Plavix), or aspirin, be sure to talk to your doctor. He or she will tell you if and when to start taking those medicines again. Make sure that you understand exactly what your doctor wants you to do. · Take pain medicines exactly as directed. ¨ If the doctor gave you a prescription medicine for pain, take it as prescribed. ¨ If you are not taking a prescription pain medicine, ask your doctor if you can take an over-the-counter medicine. · If you think your pain medicine is making you sick to your stomach: 
¨ Take your medicine after meals (unless your doctor has told you not to). ¨ Ask your doctor for a different pain medicine. · If your doctor prescribed antibiotics, take them as directed. Do not stop taking them just because you feel better. You need to take the full course of antibiotics. Follow-up care is a key part of your treatment and safety. Be sure to make and go to all appointments, and call your doctor if you are having problems. It's also a good idea to know your test results and keep a list of the medicines you take. When should you call for help? Call 911 anytime you think you may need emergency care. For example, call if: 
· You passed out (lost consciousness). · You have severe trouble breathing. · You have sudden chest pain and shortness of breath, or you cough up blood. · You have severe pain in your belly. Call your doctor now or seek immediate medical care if: · You are sick to your stomach or cannot keep fluids down. · You have trouble passing urine or stool, especially if you have pain or swelling in your lower belly. · You have signs of a blood clot, such as: 
¨ Pain in your calf, back of the knee, thigh, or groin. ¨ Redness and swelling in your leg or groin. · You have fever or severe chills. · You have pain in your back just below your rib cage. This is called flank pain. Watch closely for any changes in your health, and be sure to contact your doctor if: 
· You have pain or burning when you urinate. A burning feeling is normal for a day or two after the surgery, but call if it does not get better. · The blood has not cleared out of your urine after several weeks. · You have a frequent urge to urinate but can pass only small amounts of urine. Where can you learn more? Go to http://good-jerod.info/. Enter F358 in the search box to learn more about \"Transurethral Resection for Bladder Cancer: What to Expect at Home. \" Current as of: July 26, 2016 Content Version: 11.1 © 1413-5495 [employer]. Care instructions adapted under license by [employer] (which disclaims liability or warranty for this information). If you have questions about a medical condition or this instruction, always ask your healthcare professional. [place] [address] any warranty or liability for your use of this information. DISCHARGE SUMMARY from Nurse The following personal items are in your possession at time of discharge: 
 
Dental Appliances: Partials Visual Aid: Glasses, With patient (Given to wife) Home Medications: None Jewelry: None Clothing: Footwear, Socks, Pants, Shirt, Undergarments, Sweater Other Valuables: Kiko Amaro Personal Items Sent to Safe: Declined PATIENT INSTRUCTIONS: 
 
 
F-face looks uneven A-arms unable to move or move unevenly S-speech slurred or non-existent T-time-call 911 as soon as signs and symptoms begin-DO NOT go Back to bed or wait to see if you get better-TIME IS BRAIN. Warning Signs of HEART ATTACK Call 911 if you have these symptoms: 
? Chest discomfort. Most heart attacks involve discomfort in the center of the chest that lasts more than a few minutes, or that goes away and comes back. It can feel like uncomfortable pressure, squeezing, fullness, or pain. ? Discomfort in other areas of the upper body. Symptoms can include pain or discomfort in one or both arms, the back, neck, jaw, or stomach. ? Shortness of breath with or without chest discomfort. ? Other signs may include breaking out in a cold sweat, nausea, or lightheadedness. Don't wait more than five minutes to call [address]! Fast action can save your life. Calling 911 is almost always the fastest way to get lifesaving treatment. Emergency Medical Services staff can begin treatment when they arrive  up to an hour sooner than if someone gets to the hospital by car. The discharge information has been reviewed with the patient and spouse. The patient and spouse verbalized understanding. Discharge medications reviewed with the patient and spouse and appropriate educational materials and side effects teaching were provided. Discharge Orders None General Information Please provide this summary of care documentation to your next provider. Patient Signature:  ____________________________________________________________ Date:  ____________________________________________________________  
  
[place] Provider Signature:  ____________________________________________________________ Date:  ____________________________________________________________

## 2017-01-11 NOTE — OP NOTES
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 POSTOPERATIVE DIAGNOSIS: Recurrent transitional cell carcinoma  of the urinary bladder. PROCEDURE PERFORMED  1. Cystoscopy. 2. Tumor biopsy. 3. Random bladder biopsies. INDICATIONS: A 69-year-old male  with about a 1 year  history of transitional cell carcinoma of the urinary bladder with one 6-  week protocol of BCG following resection of a couple of tumors, one of  which did show high-grade but noninvasive characteristics. The patient  did have a recurrence noted after that and this was low-grade. The  patient had not been placed on maintenance BCG, but another attempt  was made to put him on a 6-week protocol induction BCG, to be  followed by maintenance, but the patient had reactions to the  treatment. A routine 3-month surveillance cystoscopy, however, did  disclose that there was a tumor present within a 3-month period of time  following a previous resection. A wash of this area confirmed a positive  cytology. The patient comes now for biopsy, removal of that lesion,  along with random bladder biopsies to assess for microscopic  changes. The patient will also receive mitomycin-C intravesical after  the procedure. The patient is familiar with the preparation technique  and convalescence. He is aware of the risks that include, but are not  limited to, infection, blood loss, injury, failure to diagnose, possibility of  finding cancer, possibility of needing additional treatments. He wishes  to proceed, giving his informed consent. ESTIMATED BLOOD LOSS: 5 mL. SPECIMENS REMOVED: tumor and biopsies    ANESTHESIA:  gen    FINDINGS: Solitary tumor. COMPLICATIONS: None.     DESCRIPTION OF PROCEDURE: The patient is anesthetized via the  general LMA route. Time-out is accomplished and verified. The patient  is placed in the dorsal lithotomy position where he is prepped and  draped in sterile fashion. A rigid cystoscope is inserted and careful  evaluation shows no lesions of the prostatic fossa. The trigone was  well-developed. The left ureteral orifice is distorted as result of  previous tumor resection. The remainder of the bladder epithelium is  completely unremarkable with the exception of the previously noted  anterior dome tumor. The scope was removed and replaced by a  resectoscope sheath through which is placed a bipolar Souza  resecting element. The tumor area is resected, also taking some  deeper tissue to assess for muscularis status. Following this, biopsies  are taken of the trigone, the left side and the right side of the bladder,  and these were each sent as separate specimen. All of the biopsy sites  are carefully examined and cautery is thorough. The scope is removed. A Hutton catheter is placed. The bladder is drained and 40 mg of  mitomycin-C in 80 mL of normal saline is instilled and the catheter was  clamped, and the patient will have the routine mitomycin-C protocol in  the recovery room, after which the Hutton catheter will be removed. The  procedure is terminated. The patient tolerated the procedure well.         MD LISETH Arreaga / NI  D:  01/11/2017   12:42  T:  01/11/2017   13:25  Job #:  [id number]

## 2017-01-11 NOTE — ANESTHESIA POSTPROCEDURE EVALUATION
Post-Anesthesia Evaluation and Assessment    Patient: Bharati Walker MRN: [id number]  SSN: [id number]    YOB: 1945  Age: 66 y.o. Sex: male       Cardiovascular Function/Vital Signs  Visit Vitals    /63    Pulse (!) 58    Temp 36.8 °C (98.2 °F)    Resp 13    Ht 6' (1.829 m)    Wt 83 kg (183 lb)    SpO2 98%    BMI 24.82 kg/m2       Patient is status post general anesthesia for Procedure(s):  CYSTOSCOPY TRANSURETHRAL RESECTION OF BLADDER TUMOR. Nausea/Vomiting: None    Postoperative hydration reviewed and adequate. Pain:  Pain Scale 1: Numeric (0 - 10) (01/11/17 1252)  Pain Intensity 1: 0 (01/11/17 1252)   Managed    Neurological Status:   Neuro (WDL): Within Defined Limits (01/11/17 1252)   At baseline    Mental Status and Level of Consciousness: Arousable    Pulmonary Status:   O2 Device: Room air (01/11/17 1256)   Adequate oxygenation and airway patent    Complications related to anesthesia: None    Post-anesthesia assessment completed.  No concerns    Signed By: Rey Oliver MD     January 11, 2017

## 2017-01-11 NOTE — DISCHARGE INSTRUCTIONS
Must pee by 9:30pm this evening. If unable must return to the nearest emergency room. Transurethral Resection for Bladder Cancer: What to Expect at [address] have had a transurethral resection (TUR) of the bladder. Your doctor removed cancerous tissue. You may have a small tube called a catheter in your urethra to help stop bleeding and to prevent blockage of the urethra. When the bleeding has stopped, the tube is removed. You may need to stay in the hospital 1 to 4 days. You may feel the need to urinate frequently for a while after the surgery, but this should improve with time. It may burn when you urinate. Drink lots of fluids to help with the burning. Your urine also may look pink for up to 2 to 3 weeks after surgery. This is because there may be blood in it. You may have to avoid strenuous activity and heavy lifting for about 3 weeks after TUR. This care sheet gives you a general idea about how long it will take for you to recover. But each person recovers at a different pace. Follow the steps below to feel better as quickly as possible. How can you care for yourself at home? Activity  · Rest when you feel tired. Getting enough sleep will help you recover. · Try to walk each day. Start by walking a little more than you did the day before. Bit by bit, increase the amount you walk. Walking boosts blood flow and helps prevent pneumonia and constipation. · Avoid strenuous activities, such as bicycle riding, jogging, weight lifting, or aerobic exercise, for about 3 weeks, or until your doctor says it is okay. · For about 3 weeks, avoid lifting anything that would make you strain. This may include heavy grocery bags and milk containers, a heavy briefcase or backpack, cat litter or dog food bags, a vacuum , or a child. · Ask your doctor when you can drive again. · You may shower and take baths when your doctor says it is okay.   · Ask your doctor when it is okay for you to have sex.  Diet  · You can eat your normal diet. If your stomach is upset, try bland, low-fat foods like plain rice, broiled chicken, toast, and yogurt. · Drink plenty of fluids (unless your doctor tells you not to). Medicines  · Your doctor will tell you if and when you can restart your medicines. He or she will also give you instructions about taking any new medicines. · If you take blood thinners, such as warfarin (Coumadin), clopidogrel (Plavix), or aspirin, be sure to talk to your doctor. He or she will tell you if and when to start taking those medicines again. Make sure that you understand exactly what your doctor wants you to do. · Take pain medicines exactly as directed. ¨ If the doctor gave you a prescription medicine for pain, take it as prescribed. ¨ If you are not taking a prescription pain medicine, ask your doctor if you can take an over-the-counter medicine. · If you think your pain medicine is making you sick to your stomach:  ¨ Take your medicine after meals (unless your doctor has told you not to). ¨ Ask your doctor for a different pain medicine. · If your doctor prescribed antibiotics, take them as directed. Do not stop taking them just because you feel better. You need to take the full course of antibiotics. Follow-up care is a key part of your treatment and safety. Be sure to make and go to all appointments, and call your doctor if you are having problems. It's also a good idea to know your test results and keep a list of the medicines you take. When should you call for help? Call 911 anytime you think you may need emergency care. For example, call if:  · You passed out (lost consciousness). · You have severe trouble breathing. · You have sudden chest pain and shortness of breath, or you cough up blood. · You have severe pain in your belly. Call your doctor now or seek immediate medical care if:  · You are sick to your stomach or cannot keep fluids down.   · You have trouble passing urine or stool, especially if you have pain or swelling in your lower belly. · You have signs of a blood clot, such as:  ¨ Pain in your calf, back of the knee, thigh, or groin. ¨ Redness and swelling in your leg or groin. · You have fever or severe chills. · You have pain in your back just below your rib cage. This is called flank pain. Watch closely for any changes in your health, and be sure to contact your doctor if:  · You have pain or burning when you urinate. A burning feeling is normal for a day or two after the surgery, but call if it does not get better. · The blood has not cleared out of your urine after several weeks. · You have a frequent urge to urinate but can pass only small amounts of urine. Where can you learn more? Go to http://good-jerod.info/. Enter P549 in the search box to learn more about \"Transurethral Resection for Bladder Cancer: What to Expect at Home. \"  Current as of: July 26, 2016  Content Version: 11.1  © 7630-0311 [employer]. Care instructions adapted under license by [employer] (which disclaims liability or warranty for this information). If you have questions about a medical condition or this instruction, always ask your healthcare professional. [address] any warranty or liability for your use of this information. DISCHARGE SUMMARY from Nurse    The following personal items are in your possession at time of discharge:    Dental Appliances: Partials  Visual Aid: Glasses, With patient (Given to wife)     Home Medications: None  Jewelry: None  Clothing:  Footwear, Socks, Pants, Shirt, Undergarments, Sweater  Other Valuables: Eyeglasses  Personal Items Sent to Safe: Declined  PATIENT INSTRUCTIONS:    After general anesthesia or intravenous sedation, for 24 hours or while taking prescription Narcotics:  · Limit your activities  · Do not drive and operate hazardous machinery  · Do not make important personal or business decisions  · Do  not drink alcoholic beverages  · If you have not urinated within 8 hours after discharge, please contact your surgeon on call. Report the following to your surgeon:  · Excessive pain, swelling, redness or odor of or around the surgical area  · Temperature over 100.5  · Nausea and vomiting lasting longer than 4 hours or if unable to take medications  · Any signs of decreased circulation or nerve impairment to extremity: change in color, persistent  numbness, tingling, coldness or increase pain  · Any questions      *  Please give a list of your current medications to your Primary Care Provider. *  Please update this list whenever your medications are discontinued, doses are      changed, or new medications (including over-the-counter products) are added. *  Please carry medication information at all times in case of emergency situations. These are general instructions for a healthy lifestyle:    No smoking/ No tobacco products/ Avoid exposure to second hand smoke    Surgeon General's Warning:  Quitting smoking now greatly reduces serious risk to your health. Obesity, smoking, and sedentary lifestyle greatly increases your risk for illness    A healthy diet, regular physical exercise & weight monitoring are important for maintaining a healthy lifestyle    You may be retaining fluid if you have a history of heart failure or if you experience any of the following symptoms:  Weight gain of 3 pounds or more overnight or 5 pounds in a week, increased swelling in our hands or feet or shortness of breath while lying flat in bed. Please call your doctor as soon as you notice any of these symptoms; do not wait until your next office visit.     Recognize signs and symptoms of STROKE:    F-face looks uneven    A-arms unable to move or move unevenly    S-speech slurred or non-existent    T-time-call 911 as soon as signs and symptoms begin-DO NOT go       Back to bed or wait to see if you get better-TIME IS BRAIN. Warning Signs of HEART ATTACK     Call 911 if you have these symptoms:   Chest discomfort. Most heart attacks involve discomfort in the center of the chest that lasts more than a few minutes, or that goes away and comes back. It can feel like uncomfortable pressure, squeezing, fullness, or pain.  Discomfort in other areas of the upper body. Symptoms can include pain or discomfort in one or both arms, the back, neck, jaw, or stomach.  Shortness of breath with or without chest discomfort.  Other signs may include breaking out in a cold sweat, nausea, or lightheadedness. Don't wait more than five minutes to call 911 - MINUTES MATTER! Fast action can save your life. Calling 911 is almost always the fastest way to get lifesaving treatment. Emergency Medical Services staff can begin treatment when they arrive -- up to an hour sooner than if someone gets to the hospital by car. The discharge information has been reviewed with the patient and spouse. The patient and spouse verbalized understanding. Discharge medications reviewed with the patient and spouse and appropriate educational materials and side effects teaching were provided.

## 2017-01-13 ENCOUNTER — TELEPHONE (OUTPATIENT)
Dept: UROLOGY | Age: 72
End: 2017-01-13

## 2017-01-13 DIAGNOSIS — C67.1 MALIGNANT NEOPLASM OF DOME OF URINARY BLADDER (HCC): Primary | ICD-10-CM

## 2017-01-13 NOTE — TELEPHONE ENCOUNTER
I called and advised the patient that there is no malignancy found in the resected lesion or in the random biopsies. I am now inclined to believe that this in fact was a BCG reaction and that the cytology that was suspicious for low-grade tumor was inaccurate. I have discussed this with the patient and we have reviewed the history to this point. Because of his reaction to the BCG and the absence of any new tumor, I am not going to proceed further with BCG. I am going to have the patient return in 6 weeks at which time we will do an update CT urogram and we will also send a voided urine for cytology.   If the cytology shows any question, we will then submitted for FISH    If all that is normal, we will then proceed with a 3 month surveillance cystoscopy to be done 6 weeks after that

## 2017-01-30 ENCOUNTER — HOSPITAL ENCOUNTER (OUTPATIENT)
Dept: LAB | Age: 72
Discharge: HOME | End: 2017-01-30
Payer: MEDICARE

## 2017-01-30 PROCEDURE — 88305 TISSUE EXAM BY PATHOLOGIST: CPT

## 2017-01-30 PROCEDURE — 88304 TISSUE EXAM BY PATHOLOGIST: CPT

## 2017-02-07 ENCOUNTER — HOSPITAL ENCOUNTER (OUTPATIENT)
Dept: CT IMAGING | Age: 72
Discharge: HOME | End: 2017-02-07
Attending: UROLOGY
Payer: MEDICARE

## 2017-02-07 DIAGNOSIS — C67.1 MALIGNANT NEOPLASM OF DOME OF URINARY BLADDER (HCC): ICD-10-CM

## 2017-02-07 LAB — CREAT UR-MCNC: 1.3 MG/DL (ref 0.6–1.3)

## 2017-02-07 PROCEDURE — 74011636320 HC RX REV CODE- 636/320: Performed by: UROLOGY

## 2017-02-07 PROCEDURE — 82565 ASSAY OF CREATININE: CPT

## 2017-02-07 PROCEDURE — 74178 CT ABD&PLV WO CNTR FLWD CNTR: CPT

## 2017-02-07 RX ADMIN — IOPAMIDOL 80 ML: 755 INJECTION, SOLUTION INTRAVENOUS at 10:28

## 2017-02-16 ENCOUNTER — OFFICE VISIT (OUTPATIENT)
Dept: UROLOGY | Age: 72
End: 2017-02-16

## 2017-02-16 ENCOUNTER — HOSPITAL ENCOUNTER (OUTPATIENT)
Dept: LAB | Age: 72
Discharge: HOME | End: 2017-02-16
Payer: MEDICARE

## 2017-02-16 VITALS
BODY MASS INDEX: 24.92 KG/M2 | TEMPERATURE: 97.4 F | WEIGHT: 184 LBS | SYSTOLIC BLOOD PRESSURE: 132 MMHG | OXYGEN SATURATION: 95 % | HEIGHT: 72 IN | HEART RATE: 84 BPM | DIASTOLIC BLOOD PRESSURE: 82 MMHG

## 2017-02-16 DIAGNOSIS — D49.4 BLADDER TUMOR: Primary | ICD-10-CM

## 2017-02-16 LAB
BILIRUB UR QL STRIP: NEGATIVE
GLUCOSE UR-MCNC: NEGATIVE MG/DL
KETONES P FAST UR STRIP-MCNC: NEGATIVE MG/DL
PH UR STRIP: 5.5 [PH] (ref 4.6–8)
PROT UR QL STRIP: NORMAL MG/DL
SP GR UR STRIP: 1.02 (ref 1–1.03)
UA UROBILINOGEN AMB POC: NORMAL (ref 0.2–1)
URINALYSIS CLARITY POC: CLEAR
URINALYSIS COLOR POC: YELLOW
URINE BLOOD POC: NORMAL
URINE LEUKOCYTES POC: NORMAL
URINE NITRITES POC: NEGATIVE

## 2017-02-16 PROCEDURE — 88112 CYTOPATH CELL ENHANCE TECH: CPT | Performed by: UROLOGY

## 2017-02-16 NOTE — PROGRESS NOTES
Patient returns for review of the CT scan. I have reviewed the images with the patient and his wife. The patient does have a long-standing asymptomatic gallstone. There are no kidney stones. There are cysts present. There are no intra-ureteral filling defects. The radiologist does make note of some small lymph nodes in the left periaortic area. I have reviewed this and shown them to the patient. In the absence of filling defects and in the absence of pelvic lymphadenopathy, I do not believe that this is of any consequence    We have now had an extended discussion about how to proceed. The patient will be returning for a 3 month surveillance cystoscopy. Because of problems with BCG in the past, we have decided that we will not attempt another BCG protocol unless the patient has a recurrence of his malignancy. We have also discussed the implications for the patient's son because the patient himself developed bladder cancer is a non-smoker and his father also had bladder cancer along with lung cancer but was a heavy smoker.   We have discussed cancer syndromes and possible referral for genetic counseling for his son    This visit, including the review of CT scan and review of protocol exceeded 25 minutes and virtually all of it was counseling

## 2017-02-16 NOTE — MR AVS SNAPSHOT
Visit Information Date & Time Provider Department Dept. Phone Encounter #  
 2/16/2017  9:30 AM Maik Guillen, [address] [id number] Follow-up Instructions Return in about 1 month (around 3/16/2017) for cysto. Follow-up and Disposition History Your Appointments 4/10/2017  9:30 AM  
PROCEDURE with Maik Guillen MD  
[facility] [address]) Appt Note: cysto [address] [place] [id number]  
[phone] [address] [facility] [address] Upcoming Health Maintenance Date Due DTaP/Tdap/Td series (1 - Tdap) 4/23/1965 FOBT Q 1 YEAR AGE 50-75 4/23/1994 ZOSTER VACCINE AGE 60> 4/23/2004 GLAUCOMA SCREENING Q2Y 4/23/2009 Pneumococcal 65+ High/Highest Risk (1 of 2 - PCV13) 4/23/2009 MEDICARE YEARLY EXAM 4/23/2009 INFLUENZA AGE 9 TO ADULT 8/1/2016 Allergies as of 2/16/2017  Review Complete On: 2/16/2017 By: Maik Guillen MD  
 No Known Allergies Current Immunizations  Never Reviewed No immunizations on file. Not reviewed this visit You Were Diagnosed With   
  
 Codes Comments Bladder tumor    -  Primary ICD-10-CM: D49.4 ICD-9-CM: 239.4 Vitals BP Pulse Temp Height(growth percentile) Weight(growth percentile) SpO2  
 132/82 (BP 1 Location: Left arm, BP Patient Position: Sitting) 84 97.4 °F (36.3 °C) (Oral) 6' (1.829 m) 184 lb (83.5 kg) 95% BMI Smoking Status 24.95 kg/m2 Never Smoker Vitals History BMI and BSA Data Body Mass Index Body Surface Area 24.95 kg/m 2 2.06 m 2 Preferred Pharmacy Pharmacy Name Phone [facility] [address], [address] [phone] Your Updated Medication List  
  
   
This list is accurate as of: 2/16/17 10:44 AM.  Always use your most recent med list.  
  
  
  
  
 cephALEXin 500 mg capsule Commonly known as:  Shaun Bless One tid aqs directed x 2 d before treatment  
  
 finasteride 5 mg tablet Commonly known as:  PROSCAR Take 1 Tab by mouth daily. HYDROcodone-acetaminophen  mg tablet Commonly known as:  [place] Take 1 Tab by mouth every six (6) hours as needed for Pain. Max Daily Amount: 4 Tabs.  
  
 latanoprost 0.005 % ophthalmic solution Commonly known as:  Izabel Shankar Administer 1 Drop to left eye nightly. lisinopril 5 mg tablet Commonly known as:  Germaine Ripa Take  by mouth daily. PriLOSEC 10 mg capsule Generic drug:  omeprazole Take 10 mg by mouth daily. tamsulosin 0.4 mg capsule Commonly known as:  FLOMAX One cap q d pc ZOCOR 10 mg tablet Generic drug:  simvastatin Take  by mouth nightly. We Performed the Following AMB POC URINALYSIS DIP STICK AUTO W/O MICRO [54791 CPT(R)] Follow-up Instructions Return in about 1 month (around 3/16/2017) for cysto. To-Do List   
 02/16/2017 Pathology:  CYTOLOGY NON-GYN Patient Instructions Bladder Cancer: Care Instructions Your Care Instructions Bladder cancer occurs when abnormal cells grow out of control in the bladder. It usually can be cured when it is found early. It is more common in older people. Treatment may include surgery to remove part of the bladder. If the tumor is large, the entire bladder may be removed. You may also have radiation or chemotherapy to kill the cancer cells. Sometimes people get treatment with medicines that help the body's natural defenses, or immune system, fight the cancer. Finding out that you have cancer is scary. You may feel many emotions and may need some help coping. Seek out family, friends, and counselors for support. You also can do things at home to make yourself feel better while you go through treatment.  Call the [address] ([phone]) or visit its website at [employer]. org for more information. Follow-up care is a key part of your treatment and safety. Be sure to make and go to all appointments, and call your doctor if you are having problems. It's also a good idea to know your test results and keep a list of the medicines you take. How can you care for yourself at home? · Take your medicines exactly as prescribed. Call your doctor if you think you are having a problem with your medicine. You may get medicine for nausea and vomiting if you have these side effects. · Eat healthy food. If you are not hungry, try to eat food that has protein and extra calories to keep up your strength and prevent weight loss. Drink liquid meal replacements for extra calories and protein. Try to eat your main meal early. · Get some physical activity every day, but do not get too tired. Keep doing the hobbies you enjoy as your energy allows. · Take steps to control your stress and workload. Learn relaxation techniques. ¨ Share your feelings. Stress and tension affect our emotions. By expressing your feelings to others, you may be able to understand and cope with them. ¨ Consider joining a support group. Talking about a problem with your spouse, a good friend, or other people with similar problems is a good way to reduce tension and stress. ¨ Express yourself through art. Try writing, dance, art, or crafts to relieve tension. Some dance, writing, or art groups may be available just for people who have cancer. ¨ Be kind to your body and mind. Getting enough sleep, eating a healthy diet, and taking time to do things you enjoy can contribute to an overall feeling of balance in your life and help reduce stress. ¨ Get help if you need it. Discuss your concerns with your doctor or counselor. · If you are vomiting or have diarrhea: ¨ Drink plenty of fluids (enough so that your urine is light yellow or clear like water) to prevent dehydration. Choose water and other caffeine-free clear liquids. If you have kidney, heart, or liver disease and have to limit fluids, talk with your doctor before you increase the amount of fluids you drink. ¨ When you are able to eat, try clear soups, mild foods, and liquids until all symptoms are gone for 12 to 48 hours. Other good choices include dry toast, crackers, cooked cereal, and gelatin dessert, such as Jell-O. 
· Take care of your urinary tract to prevent problems such as infection, which can be caused by bladder cancer and its treatment. Limit drinks with caffeine, drink plenty of fluids, and urinate every 3 to 4 hours. · If you have not already done so, prepare a list of advance directives. Advance directives are instructions to your doctor and family members about what kind of care you want if you become unable to speak for yourself. When should you call for help? Call 911 anytime you think you may need emergency care. For example, call if: 
· You passed out (lost consciousness). · You have severe belly pain. Call your doctor now or seek immediate medical care if: · Vomiting lasts longer than 24 hours and you are not able to keep down fluids. · You have symptoms of a urinary infection. For example: ¨ You have blood or pus in your urine. ¨ You have pain in your back just below your rib cage. This is called flank pain. ¨ You have a fever, chills, or body aches. ¨ It hurts to urinate. ¨ You have groin or belly pain. Watch closely for changes in your health, and be sure to contact your doctor if: 
· You are not able to eat well and are losing weight. · You feel more tired than usual. 
Where can you learn more? Go to http://good-jerod.info/. Enter M796 in the search box to learn more about \"Bladder Cancer: Care Instructions. \" Current as of: July 26, 2016 Content Version: 11.1 © 5525-4423 [employer]. Care instructions adapted under license by [employer] (which disclaims liability or warranty for this information). If you have questions about a medical condition or this instruction, always ask your healthcare professional. [address] any warranty or liability for your use of this information. Patient Instructions History Please provide this summary of care documentation to your next provider. Your primary care clinician is listed as Matthew Acevedo. If you have any questions after today's visit, please call [phone].

## 2017-02-16 NOTE — PATIENT INSTRUCTIONS
Bladder Cancer: Care Instructions  Your Care Instructions  Bladder cancer occurs when abnormal cells grow out of control in the bladder. It usually can be cured when it is found early. It is more common in older people. Treatment may include surgery to remove part of the bladder. If the tumor is large, the entire bladder may be removed. You may also have radiation or chemotherapy to kill the cancer cells. Sometimes people get treatment with medicines that help the body's natural defenses, or immune system, fight the cancer. Finding out that you have cancer is scary. You may feel many emotions and may need some help coping. Seek out family, friends, and counselors for support. You also can do things at home to make yourself feel better while you go through treatment. Call the [employer] (8-395.335.1337) or visit its website at [address] for more information. Follow-up care is a key part of your treatment and safety. Be sure to make and go to all appointments, and call your doctor if you are having problems. It's also a good idea to know your test results and keep a list of the medicines you take. How can you care for yourself at home? · Take your medicines exactly as prescribed. Call your doctor if you think you are having a problem with your medicine. You may get medicine for nausea and vomiting if you have these side effects. · Eat healthy food. If you are not hungry, try to eat food that has protein and extra calories to keep up your strength and prevent weight loss. Drink liquid meal replacements for extra calories and protein. Try to eat your main meal early. · Get some physical activity every day, but do not get too tired. Keep doing the hobbies you enjoy as your energy allows. · Take steps to control your stress and workload. Learn relaxation techniques. ¨ Share your feelings. Stress and tension affect our emotions.  By expressing your feelings to others, you may be able to understand and cope with them. ¨ Consider joining a support group. Talking about a problem with your spouse, a good friend, or other people with similar problems is a good way to reduce tension and stress. ¨ Express yourself through art. Try writing, dance, art, or crafts to relieve tension. Some dance, writing, or art groups may be available just for people who have cancer. ¨ Be kind to your body and mind. Getting enough sleep, eating a healthy diet, and taking time to do things you enjoy can contribute to an overall feeling of balance in your life and help reduce stress. ¨ Get help if you need it. Discuss your concerns with your doctor or counselor. · If you are vomiting or have diarrhea:  ¨ Drink plenty of fluids (enough so that your urine is light yellow or clear like water) to prevent dehydration. Choose water and other caffeine-free clear liquids. If you have kidney, heart, or liver disease and have to limit fluids, talk with your doctor before you increase the amount of fluids you drink. ¨ When you are able to eat, try clear soups, mild foods, and liquids until all symptoms are gone for 12 to 48 hours. Other good choices include dry toast, crackers, cooked cereal, and gelatin dessert, such as Jell-O.  · Take care of your urinary tract to prevent problems such as infection, which can be caused by bladder cancer and its treatment. Limit drinks with caffeine, drink plenty of fluids, and urinate every 3 to 4 hours. · If you have not already done so, prepare a list of advance directives. Advance directives are instructions to your doctor and family members about what kind of care you want if you become unable to speak for yourself. When should you call for help? Call 911 anytime you think you may need emergency care. For example, call if:  · You passed out (lost consciousness). · You have severe belly pain.   Call your doctor now or seek immediate medical care if:  · Vomiting lasts longer than 24 hours and you are not able to keep down fluids. · You have symptoms of a urinary infection. For example:  ¨ You have blood or pus in your urine. ¨ You have pain in your back just below your rib cage. This is called flank pain. ¨ You have a fever, chills, or body aches. ¨ It hurts to urinate. ¨ You have groin or belly pain. Watch closely for changes in your health, and be sure to contact your doctor if:  · You are not able to eat well and are losing weight. · You feel more tired than usual.  Where can you learn more? Go to http://good-jerod.info/. Enter M796 in the search box to learn more about \"Bladder Cancer: Care Instructions. \"  Current as of: July 26, 2016  Content Version: 11.1  © 1777-4529 [employer]wise[employer]. Care instructions adapted under license by [employer] (which disclaims liability or warranty for this information). If you have questions about a medical condition or this instruction, always ask your healthcare professional. [place] [address] any warranty or liability for your use of this information.

## 2017-02-16 NOTE — PROGRESS NOTES
Mr. Johnnie Doty has a reminder for a \"due or due soon\" health maintenance. I have asked that he contact his primary care provider for follow-up on this health maintenance.

## 2017-05-01 ENCOUNTER — OFFICE VISIT (OUTPATIENT)
Dept: UROLOGY | Age: 72
End: 2017-05-01

## 2017-05-01 ENCOUNTER — HOSPITAL ENCOUNTER (OUTPATIENT)
Dept: LAB | Age: 72
Discharge: HOME | End: 2017-05-01
Payer: MEDICARE

## 2017-05-01 VITALS
HEIGHT: 72 IN | WEIGHT: 190 LBS | OXYGEN SATURATION: 96 % | SYSTOLIC BLOOD PRESSURE: 165 MMHG | DIASTOLIC BLOOD PRESSURE: 84 MMHG | HEART RATE: 70 BPM | BODY MASS INDEX: 25.73 KG/M2

## 2017-05-01 DIAGNOSIS — D49.4 BLADDER TUMOR: Primary | ICD-10-CM

## 2017-05-01 LAB
BILIRUB UR QL STRIP: NEGATIVE
GLUCOSE UR-MCNC: NEGATIVE MG/DL
KETONES P FAST UR STRIP-MCNC: NEGATIVE MG/DL
PH UR STRIP: 6 [PH] (ref 4.6–8)
PROT UR QL STRIP: NORMAL MG/DL
SP GR UR STRIP: 1.03 (ref 1–1.03)
UA UROBILINOGEN AMB POC: NORMAL (ref 0.2–1)
URINALYSIS CLARITY POC: CLEAR
URINALYSIS COLOR POC: YELLOW
URINE BLOOD POC: NEGATIVE
URINE LEUKOCYTES POC: NORMAL
URINE NITRITES POC: NEGATIVE

## 2017-05-01 PROCEDURE — 88112 CYTOPATH CELL ENHANCE TECH: CPT | Performed by: UROLOGY

## 2017-05-01 NOTE — PROGRESS NOTES
The patient is prepared for cystoscopy. He has been counseled regarding the risks which include, but are not limited to, infection, bleeding, injury, failure to diagnose, and possible need for additional procedures. He wishes to proceed, giving his informed consent. Procedure note: The patient is in the slightly inclined supine position where he is prepped and draped in sterile fashion. Lidocaine jelly has been inserted into the urethra and a suitable time has been permitted to elapse for establishment of anesthesia. A flexible cystoscope is inserted and video cystoscopy is carried out with the patient able to observe the monitor. The findings are as follows:  1. Pendulous urethra normal  2. Bulbar urethra normal  3. External sphincter normal  4. Prostate the prostate demonstrates lateral lobe hypertrophy more so on the left than on the right and there is encroachment with convergence throughout the entirety of a mildly elongated prostatic fossa  5. Bladder neck  6. Trigone  7. Bladder wall the anterior wall has the area where the biopsy was done previously and there remains some necrotic adherent tissue but with no underlying epithelial abnormality this area is irrigated and this tissue goes away similarly, on the left sidewall near the bladder neck there is more of this ptotic looking tissue without any underlying epithelial abnormality this is also washed away    Pertinent observations are pointed out to the patient and all the patient's questions are entertained. The procedure is terminated . Full discussion regarding the cystoscopic findings will be carried out. Following removal of the cystoscope, the patient is left in position for a suitable period of time and allowed to dress. Post procedure instructions are given. The patient tolerated the procedure well. The patient and his wife have come into the office for extended discussion mowing the cystoscopy.   The patient gave a voided specimen and we will send that for cytology and reflex submission for  FISH. We have discussed the implications in the event that this is necrotic material.  The implications regarding his immune system also further discussed. We again talked about the CT scan and the lymphadenopathy and that he should follow-up with his primary care physician regarding that, but I do not believe that it is of any great significance. Patient will return in 3 months and is advised about the 3 month cystoscopy for surveillance purposes for the first 2 years following the resection of the last tumor. That will carry the patient through August 2018. It is again noted that the patient did have a PSA of 11.8 and underwent prostate biopsies about a year ago. We will arrange for a free PSA to be drawn before the next cystoscopy in 3 months    This visit exceeded 15 minutes independent of the cystoscopy and greater than 50% was counseling. The patient expresses understanding of the treatment plan and wishes to proceed    This dictation used voice recognition software and there may be mistakes.     Perfecto Burden MD

## 2017-05-01 NOTE — PROGRESS NOTES
[facility]  OFFICE PROCEDURE PROGRESS NOTE        Chart reviewed for the following:   Josr MICHELLE LPN, have reviewed the History, Physical and updated the Allergic reactions for Ray S [address] performed immediately prior to start of procedure:   Renata Carlson LPN, have performed the following reviews on [address] prior to the start of the procedure:            * Patient was identified by name and date of birth   * Agreement on procedure being performed was verified  * Risks and Benefits explained to the patient  * Procedure site verified and marked as necessary  * Patient was positioned for comfort  * Consent was signed and verified     Time: 10:17AM      Date of procedure: 5/1/2017    Procedure performed by:  Jerod Bullard MD    Provider assisted by: Marly Ly LPN    Patient assisted by: wife    How tolerated by patient: tolerated the procedure well with no complications    Post Procedural Pain Scale: 0 - No Hurt    Comments: none    Patient verbalized understanding of procedure and post procedure instructions.

## 2017-05-01 NOTE — PROGRESS NOTES
Mr. Juan Antonio Shaffer has a reminder for a \"due or due soon\" health maintenance. I have asked that he contact his primary care provider for follow-up on this health maintenance. RBV. Per Dr. Cynthia Cam Patient to have urine sent for cytology.

## 2017-05-01 NOTE — PATIENT INSTRUCTIONS
Cystoscopy: Care Instructions  Your Care Instructions  Cystoscopy is a test. It uses a thin, lighted tube called a cystoscope to see the inside of the bladder and the urethra. The urethra is the tube that carries urine out of the body. This test is helpful because it lets your doctor see areas of your bladder and urethra that are hard to see on X-rays. It can help your doctor find bladder stones, tumors, bleeding, and infection. During this test, your doctor also can take tissue and urine samples. And if your doctor finds small stones or growths, he or she can remove them. In most cases the scope is in the bladder for less than 10 minutes. But the entire test may take 45 minutes or longer. You will probably get local anesthesia. This numbs a small part of your body. Or you may get spinal anesthesia, which numbs more of your body. Once in a while, doctors use general anesthesia. It makes you sleep during surgery. If you get a local anesthetic, you may be able to get up right after the test. But if you had spinal or general anesthesia, you will stay in the recovery room until you are able to walk or you have feeling again in your lower body. This usually takes about an hour. Your doctor may be able to tell you some of the results right after the test. But the complete results may take several days. Follow-up care is a key part of your treatment and safety. Be sure to make and go to all appointments, and call your doctor if you are having problems. It's also a good idea to know your test results and keep a list of the medicines you take. How can you care for yourself at home? Before the test  · If you are having a local anesthetic, you can eat and drink before the test.  · If you are having a spinal or general anesthetic, do not eat or drink anything for at least 8 hours before the test. Tell your doctor what medicines you take.   · If you are not staying overnight in the hospital, make sure you have someone who can drive you home after the test.  After the test  · If your doctor prescribed antibiotics, take them as directed. Do not stop taking them just because you feel better. You need to take the full course of antibiotics. · You may have some burning when you urinate for a day or two after the test. You may feel better if you drink more fluids. This may also help prevent an infection. · Your urine may have a pinkish color for a few days after the test.  When should you call for help? Call your doctor now or seek immediate medical care if:  · Your urine is still red or you see blood clots after you have urinated several times. · You cannot pass urine 8 hours after the test.  · You get a fever or chills. · You have pain in your belly or your back just below your rib cage. This is also called flank pain. Watch closely for changes in your health, and be sure to contact your doctor if:  · You have pain or burning when you urinate. A burning sensation is normal for a day or two after the test. But call if it does not get better. · You have a frequent urge to urinate but can pass only small amounts of urine. · Your urine is pink, red, or cloudy or smells bad. It is normal for the urine to have a pinkish color for a few days after the test. But call if it does not get better. · You do not get better as expected. Where can you learn more? Go to http://good-jerod.info/. Enter M009 in the search box to learn more about \"Cystoscopy: Care Instructions. \"  Current as of: August 12, 2016  Content Version: 11.2  © 2200-2705 [employer]. Care instructions adapted under license by [employer] (which disclaims liability or warranty for this information). If you have questions about a medical condition or this instruction, always ask your healthcare professional. [address] any warranty or liability for your use of this information.

## 2017-08-07 ENCOUNTER — HOSPITAL ENCOUNTER (OUTPATIENT)
Dept: LAB | Age: 72
Discharge: HOME | End: 2017-08-07
Payer: MEDICARE

## 2017-08-07 ENCOUNTER — OFFICE VISIT (OUTPATIENT)
Dept: UROLOGY | Age: 72
End: 2017-08-07

## 2017-08-07 VITALS
SYSTOLIC BLOOD PRESSURE: 132 MMHG | OXYGEN SATURATION: 92 % | BODY MASS INDEX: 25.19 KG/M2 | HEART RATE: 79 BPM | WEIGHT: 186 LBS | DIASTOLIC BLOOD PRESSURE: 90 MMHG | HEIGHT: 72 IN

## 2017-08-07 DIAGNOSIS — R97.20 ELEVATED PROSTATE SPECIFIC ANTIGEN (PSA): ICD-10-CM

## 2017-08-07 DIAGNOSIS — C67.9 MALIGNANT NEOPLASM OF URINARY BLADDER, UNSPECIFIED SITE (HCC): Primary | ICD-10-CM

## 2017-08-07 LAB
BILIRUB UR QL STRIP: NEGATIVE
GLUCOSE UR-MCNC: NEGATIVE MG/DL
KETONES P FAST UR STRIP-MCNC: NEGATIVE MG/DL
PH UR STRIP: 5 [PH] (ref 4.6–8)
PROT UR QL STRIP: NEGATIVE MG/DL
SP GR UR STRIP: 1.02 (ref 1–1.03)
UA UROBILINOGEN AMB POC: NORMAL (ref 0.2–1)
URINALYSIS CLARITY POC: CLEAR
URINALYSIS COLOR POC: YELLOW
URINE BLOOD POC: NORMAL
URINE LEUKOCYTES POC: NEGATIVE
URINE NITRITES POC: NEGATIVE

## 2017-08-07 PROCEDURE — 88112 CYTOPATH CELL ENHANCE TECH: CPT | Performed by: UROLOGY

## 2017-08-07 PROCEDURE — 84154 ASSAY OF PSA FREE: CPT | Performed by: UROLOGY

## 2017-08-07 NOTE — PROGRESS NOTES
I discussed the cystoscopy results with the patient and the wife. Please refer to the cystoscopy procedure note. He had an abnormal cytology in May. The lesion that I see is quite small and probably only a couple of millimeters with a cover of dystrophic calcification. However, there also had been tumor in this area in the past and this can be seen in the resection from February 2016. We have talked about this in detail and have left at that I will do a cytology with voided urine and reflex this to fish. If that is abnormal we will proceed with resection biopsy of this area. If the cytology is negative we will then simply wait 3 months for another surveillance cystoscopy and look specifically at this area.   Again, I want to state again that the patient had significant problems with BCG in the past and I am hesitant to start it again unless I have a clearly obvious pathology report    This visit independent of the cystoscopy exceeded 15 minutes and virtually all of it was counseling

## 2017-08-07 NOTE — PROGRESS NOTES
[facility]  OFFICE PROCEDURE PROGRESS NOTE        Chart reviewed for the following:   Mayte MICHELLE LPN, have reviewed the History, Physical and updated the Allergic reactions for Ray S [address] performed immediately prior to start of procedure:   Alpa Hare LPN, have performed the following reviews on [address] prior to the start of the procedure:            * Patient was identified by name and date of birth   * Agreement on procedure being performed was verified  * Risks and Benefits explained to the patient  * Procedure site verified and marked as necessary  * Patient was positioned for comfort  * Consent was signed and verified     Time: 10:11AM      Date of procedure: 8/7/2017    Procedure performed by:  Nikkie Wooten MD    Provider assisted by: Ankit Uriarte LPN    Patient assisted by: wife    How tolerated by patient: tolerated the procedure well with no complications    Post Procedural Pain Scale: 0 - No Hurt    Comments: none    Patient verbalized understanding of procedure and post procedure instructions. RBV. Per Dr. Yariel Taveras Patient to have urine sent for cytology.

## 2017-08-07 NOTE — MR AVS SNAPSHOT
Visit Information Date & Time Provider Department Dept. Phone Encounter #  
 8/7/2017  9:45 AM [facility] [phone] [id number] Follow-up Instructions Return in about 3 months (around 11/7/2017) for cysto. Follow-up and Disposition History Your Appointments 11/7/2017  9:30 AM  
PROCEDURE with Brayden Devries MD  
[facility] [address]) Appt Note: cysto [address] Mike A [address] [address]  
[phone] [address] [facility] [address] Upcoming Health Maintenance Date Due DTaP/Tdap/Td series (1 - Tdap) 4/23/1965 FOBT Q 1 YEAR AGE 50-75 4/23/1994 ZOSTER VACCINE AGE 60> 2/23/2004 GLAUCOMA SCREENING Q2Y 4/23/2009 Pneumococcal 65+ High/Highest Risk (1 of 2 - PCV13) 4/23/2009 MEDICARE YEARLY EXAM 4/23/2009 INFLUENZA AGE 9 TO ADULT 8/1/2017 Allergies as of 8/7/2017  Review Complete On: 8/7/2017 By: Brayden Devries MD  
 No Known Allergies Current Immunizations  Never Reviewed No immunizations on file. Not reviewed this visit You Were Diagnosed With   
  
 Codes Comments Malignant neoplasm of urinary bladder, unspecified site [facility])    -  Primary ICD-10-CM: C67.9 ICD-9-CM: 188. 9 Elevated prostate specific antigen (PSA)     ICD-10-CM: R97.20 ICD-9-CM: 790.93 Vitals BP Pulse Height(growth percentile) Weight(growth percentile) SpO2 BMI  
 132/90 (BP 1 Location: Left arm, BP Patient Position: Sitting) 79 6' (1.829 m) 186 lb (84.4 kg) 92% 25.23 kg/m2 Smoking Status Never Smoker Vitals History BMI and BSA Data Body Mass Index Body Surface Area  
 25.23 kg/m 2 2.07 m 2 Preferred Pharmacy Pharmacy Name Phone [facility] [address], [address] [phone] Your Updated Medication List  
  
   
 This list is accurate as of: 8/7/17 10:56 AM.  Always use your most recent med list.  
  
  
  
  
 finasteride 5 mg tablet Commonly known as:  PROSCAR Take 1 Tab by mouth daily. HYDROcodone-acetaminophen  mg tablet Commonly known as:  Brigitte Wilks Take 1 Tab by mouth every six (6) hours as needed for Pain. Max Daily Amount: 4 Tabs.  
  
 latanoprost 0.005 % ophthalmic solution Commonly known as:  Jean Newsome Administer 1 Drop to left eye nightly. lisinopril 5 mg tablet Commonly known as:  Jose Peek Take  by mouth daily. PriLOSEC 10 mg capsule Generic drug:  omeprazole Take 10 mg by mouth daily. tamsulosin 0.4 mg capsule Commonly known as:  FLOMAX One cap q d pc ZOCOR 10 mg tablet Generic drug:  simvastatin Take  by mouth nightly. We Performed the Following AMB POC URINALYSIS DIP STICK AUTO W/O MICRO [87982 CPT(R)] CYSTOSCOPY [28290 CPT(R)] UT COLLECTION VENOUS BLOOD,VENIPUNCTURE [id number] CPT(R)] Follow-up Instructions Return in about 3 months (around 11/7/2017) for cysto. To-Do List   
 08/07/2017 Pathology:  CYTOLOGY NON-GYN   
  
 08/07/2017 Lab:  PSA, TOTAL &  FREE Patient Instructions Cystoscopy: Care Instructions Your Care Instructions Cystoscopy is a test. It uses a thin, lighted tube called a cystoscope to see the inside of the bladder and the urethra. The urethra is the tube that carries urine out of the body. This test is helpful because it lets your doctor see areas of your bladder and urethra that are hard to see on X-rays. It can help your doctor find bladder stones, tumors, bleeding, and infection. During this test, your doctor also can take tissue and urine samples. And if your doctor finds small stones or growths, he or she can remove them. In most cases the scope is in the bladder for less than 10 minutes.  But the entire test may take 45 minutes or longer. You will probably get local anesthesia. This numbs a small part of your body. Or you may get spinal anesthesia, which numbs more of your body. Once in a while, doctors use general anesthesia. It makes you sleep during surgery. If you get a local anesthetic, you may be able to get up right after the test. But if you had spinal or general anesthesia, you will stay in the recovery room until you are able to walk or you have feeling again in your lower body. This usually takes about an hour. Your doctor may be able to tell you some of the results right after the test. But the complete results may take several days. Follow-up care is a key part of your treatment and safety. Be sure to make and go to all appointments, and call your doctor if you are having problems. It's also a good idea to know your test results and keep a list of the medicines you take. How can you care for yourself at home? Before the test 
· If you are having a local anesthetic, you can eat and drink before the test. 
· If you are having a spinal or general anesthetic, do not eat or drink anything for at least 8 hours before the test. Tell your doctor what medicines you take. · If you are not staying overnight in the hospital, make sure you have someone who can drive you home after the test. 
After the test 
· If your doctor prescribed antibiotics, take them as directed. Do not stop taking them just because you feel better. You need to take the full course of antibiotics. · You may have some burning when you urinate for a day or two after the test. You may feel better if you drink more fluids. This may also help prevent an infection. · Your urine may have a pinkish color for a few days after the test. 
When should you call for help? Call your doctor now or seek immediate medical care if: 
· Your urine is still red or you see blood clots after you have urinated several times. · You cannot pass urine 8 hours after the test. 
· You get a fever or chills. · You have pain in your belly or your back just below your rib cage. This is also called flank pain. Watch closely for changes in your health, and be sure to contact your doctor if: 
· You have pain or burning when you urinate. A burning sensation is normal for a day or two after the test. But call if it does not get better. · You have a frequent urge to urinate but can pass only small amounts of urine. · Your urine is pink, red, or cloudy or smells bad. It is normal for the urine to have a pinkish color for a few days after the test. But call if it does not get better. · You do not get better as expected. Where can you learn more? Go to http://good-jerod.info/. Enter K837 in the search box to learn more about \"Cystoscopy: Care Instructions. \" Current as of: August 12, 2016 Content Version: 11.3 © 5382-4867 [employer]. Care instructions adapted under license by [employer] (which disclaims liability or warranty for this information). If you have questions about a medical condition or this instruction, always ask your healthcare professional. [address] any warranty or liability for your use of this information. Patient Instructions History Please provide this summary of care documentation to your next provider. Your primary care clinician is listed as Nelly Koyanagi. If you have any questions after today's visit, please call [phone].

## 2017-08-07 NOTE — PATIENT INSTRUCTIONS
Cystoscopy: Care Instructions  Your Care Instructions  Cystoscopy is a test. It uses a thin, lighted tube called a cystoscope to see the inside of the bladder and the urethra. The urethra is the tube that carries urine out of the body. This test is helpful because it lets your doctor see areas of your bladder and urethra that are hard to see on X-rays. It can help your doctor find bladder stones, tumors, bleeding, and infection. During this test, your doctor also can take tissue and urine samples. And if your doctor finds small stones or growths, he or she can remove them. In most cases the scope is in the bladder for less than 10 minutes. But the entire test may take 45 minutes or longer. You will probably get local anesthesia. This numbs a small part of your body. Or you may get spinal anesthesia, which numbs more of your body. Once in a while, doctors use general anesthesia. It makes you sleep during surgery. If you get a local anesthetic, you may be able to get up right after the test. But if you had spinal or general anesthesia, you will stay in the recovery room until you are able to walk or you have feeling again in your lower body. This usually takes about an hour. Your doctor may be able to tell you some of the results right after the test. But the complete results may take several days. Follow-up care is a key part of your treatment and safety. Be sure to make and go to all appointments, and call your doctor if you are having problems. It's also a good idea to know your test results and keep a list of the medicines you take. How can you care for yourself at home? Before the test  · If you are having a local anesthetic, you can eat and drink before the test.  · If you are having a spinal or general anesthetic, do not eat or drink anything for at least 8 hours before the test. Tell your doctor what medicines you take.   · If you are not staying overnight in the hospital, make sure you have someone who can drive you home after the test.  After the test  · If your doctor prescribed antibiotics, take them as directed. Do not stop taking them just because you feel better. You need to take the full course of antibiotics. · You may have some burning when you urinate for a day or two after the test. You may feel better if you drink more fluids. This may also help prevent an infection. · Your urine may have a pinkish color for a few days after the test.  When should you call for help? Call your doctor now or seek immediate medical care if:  · Your urine is still red or you see blood clots after you have urinated several times. · You cannot pass urine 8 hours after the test.  · You get a fever or chills. · You have pain in your belly or your back just below your rib cage. This is also called flank pain. Watch closely for changes in your health, and be sure to contact your doctor if:  · You have pain or burning when you urinate. A burning sensation is normal for a day or two after the test. But call if it does not get better. · You have a frequent urge to urinate but can pass only small amounts of urine. · Your urine is pink, red, or cloudy or smells bad. It is normal for the urine to have a pinkish color for a few days after the test. But call if it does not get better. · You do not get better as expected. Where can you learn more? Go to http://good-jerod.info/. Enter K054 in the search box to learn more about \"Cystoscopy: Care Instructions. \"  Current as of: August 12, 2016  Content Version: 11.3  © 4283-4520 [employer]. Care instructions adapted under license by [employer] (which disclaims liability or warranty for this information). If you have questions about a medical condition or this instruction, always ask your healthcare professional. [address] any warranty or liability for your use of this information.

## 2017-08-07 NOTE — PROGRESS NOTES
MrNoam Og has a reminder for a \"due or due soon\" health maintenance. I have asked that he contact his primary care provider for follow-up on this health maintenance. RBV per Dr. Luciana Dejesus blood drawn in office today for Free and Total PSA for Elevated PSA.

## 2017-08-08 LAB
PSA FREE MFR SERPL: 20.2 %
PSA FREE SERPL-MCNC: 1.7 NG/ML
PSA SERPL-MCNC: 8.4 NG/ML (ref 0–4)

## 2017-08-09 DIAGNOSIS — C67.9 MALIGNANT NEOPLASM OF URINARY BLADDER, UNSPECIFIED SITE (HCC): ICD-10-CM

## 2017-11-07 ENCOUNTER — HOSPITAL ENCOUNTER (OUTPATIENT)
Dept: LAB | Age: 72
Discharge: HOME | End: 2017-11-07
Payer: MEDICARE

## 2017-11-07 ENCOUNTER — OFFICE VISIT (OUTPATIENT)
Dept: UROLOGY | Age: 72
End: 2017-11-07

## 2017-11-07 VITALS
BODY MASS INDEX: 26.01 KG/M2 | SYSTOLIC BLOOD PRESSURE: 154 MMHG | HEIGHT: 72 IN | HEART RATE: 73 BPM | OXYGEN SATURATION: 91 % | WEIGHT: 192 LBS | DIASTOLIC BLOOD PRESSURE: 82 MMHG

## 2017-11-07 DIAGNOSIS — R35.1 NOCTURIA: ICD-10-CM

## 2017-11-07 DIAGNOSIS — R82.81 PYURIA: ICD-10-CM

## 2017-11-07 DIAGNOSIS — R31.29 MICROSCOPIC HEMATURIA: ICD-10-CM

## 2017-11-07 DIAGNOSIS — C67.9 MALIGNANT NEOPLASM OF URINARY BLADDER, UNSPECIFIED SITE (HCC): Primary | ICD-10-CM

## 2017-11-07 LAB
BILIRUB UR QL STRIP: NEGATIVE
GLUCOSE UR-MCNC: NEGATIVE MG/DL
KETONES P FAST UR STRIP-MCNC: NEGATIVE MG/DL
PH UR STRIP: 5.5 [PH] (ref 4.6–8)
PROT UR QL STRIP: NORMAL
SP GR UR STRIP: 1.02 (ref 1–1.03)
UA UROBILINOGEN AMB POC: NORMAL (ref 0.2–1)
URINALYSIS CLARITY POC: CLEAR
URINALYSIS COLOR POC: YELLOW
URINE BLOOD POC: NORMAL
URINE LEUKOCYTES POC: NORMAL
URINE NITRITES POC: NEGATIVE

## 2017-11-07 PROCEDURE — 87186 SC STD MICRODIL/AGAR DIL: CPT | Performed by: UROLOGY

## 2017-11-07 PROCEDURE — 87086 URINE CULTURE/COLONY COUNT: CPT | Performed by: UROLOGY

## 2017-11-07 PROCEDURE — 88112 CYTOPATH CELL ENHANCE TECH: CPT | Performed by: UROLOGY

## 2017-11-07 PROCEDURE — 87077 CULTURE AEROBIC IDENTIFY: CPT | Performed by: UROLOGY

## 2017-11-07 NOTE — MR AVS SNAPSHOT
Visit Information Date & Time Provider Department Dept. Phone Encounter #  
 11/7/2017  9:30 AM Kami Hayes, 503 [facility] [phone] Your Appointments 11/27/2017 10:00 AM  
PROCEDURE with MD OLIVIER [facility]aman [facility]) Appt Note: cysto [address] [id number]  
[phone] [address] 600 [facility] [address] Upcoming Health Maintenance Date Due DTaP/Tdap/Td series (1 - Tdap) 4/23/1965 FOBT Q 1 YEAR AGE 50-75 4/23/1994 ZOSTER VACCINE AGE 60> 2/23/2004 GLAUCOMA SCREENING Q2Y 4/23/2009 Pneumococcal 65+ High/Highest Risk (1 of 2 - PCV13) 4/23/2009 MEDICARE YEARLY EXAM 4/23/2009 INFLUENZA AGE 9 TO ADULT 8/1/2017 Allergies as of 11/7/2017  Review Complete On: 11/7/2017 By: Kim Littlejohn LPN No Known Allergies Current Immunizations  Never Reviewed No immunizations on file. Not reviewed this visit You Were Diagnosed With   
  
 Codes Comments Malignant neoplasm of urinary bladder, unspecified site [facility])    -  Primary ICD-10-CM: C67.9 ICD-9-CM: 188. 9 Vitals BP Pulse Height(growth percentile) Weight(growth percentile) SpO2 BMI  
 154/82 (BP 1 Location: Left arm, BP Patient Position: Sitting) 73 6' (1.829 m) 192 lb (87.1 kg) 91% 26.04 kg/m2 Smoking Status Never Smoker Vitals History BMI and BSA Data Body Mass Index Body Surface Area 26.04 kg/m 2 2.1 m 2 Preferred Pharmacy Pharmacy Name Phone Kristin Blizzard [address], [address] [phone] Your Updated Medication List  
  
   
This list is accurate as of: 11/7/17  9:54 AM.  Always use your most recent med list.  
  
  
  
  
 finasteride 5 mg tablet Commonly known as:  PROSCAR Take 1 Tab by mouth daily. HYDROcodone-acetaminophen  mg tablet Commonly known as:  1463 Catherine Saleem Take 1 Tab by mouth every six (6) hours as needed for Pain. Max Daily Amount: 4 Tabs.  
  
 latanoprost 0.005 % ophthalmic solution Commonly known as:  Terese Anne Administer 1 Drop to left eye nightly. lisinopril 5 mg tablet Commonly known as:  Brendan Shows Take  by mouth daily. PriLOSEC 10 mg capsule Generic drug:  omeprazole Take 10 mg by mouth daily. tamsulosin 0.4 mg capsule Commonly known as:  FLOMAX One cap q d pc ZOCOR 10 mg tablet Generic drug:  simvastatin Take  by mouth nightly. We Performed the Following AMB POC URINALYSIS DIP STICK AUTO W/O MICRO [14097 CPT(R)] Patient Instructions Cystoscopy: Care Instructions Your Care Instructions Cystoscopy is a test. It uses a thin, lighted tube called a cystoscope to see the inside of the bladder and the urethra. The urethra is the tube that carries urine out of the body. This test is helpful because it lets your doctor see areas of your bladder and urethra that are hard to see on X-rays. It can help your doctor find bladder stones, tumors, bleeding, and infection. During this test, your doctor also can take tissue and urine samples. And if your doctor finds small stones or growths, he or she can remove them. In most cases the scope is in the bladder for less than 10 minutes. But the entire test may take 45 minutes or longer. You will probably get local anesthesia. This numbs a small part of your body. Or you may get spinal anesthesia, which numbs more of your body. Once in a while, doctors use general anesthesia. It makes you sleep during surgery. If you get a local anesthetic, you may be able to get up right after the test. But if you had spinal or general anesthesia, you will stay in the recovery room until you are able to walk or you have feeling again in your lower body. This usually takes about an hour. Your doctor may be able to tell you some of the results right after the test. But the complete results may take several days. Follow-up care is a key part of your treatment and safety. Be sure to make and go to all appointments, and call your doctor if you are having problems. It's also a good idea to know your test results and keep a list of the medicines you take. How can you care for yourself at home? Before the test 
· If you are having a local anesthetic, you can eat and drink before the test. 
· If you are having a spinal or general anesthetic, do not eat or drink anything for at least 8 hours before the test. Tell your doctor what medicines you take. · If you are not staying overnight in the hospital, make sure you have someone who can drive you home after the test. 
After the test 
· If your doctor prescribed antibiotics, take them as directed. Do not stop taking them just because you feel better. You need to take the full course of antibiotics. · You may have some burning when you urinate for a day or two after the test. You may feel better if you drink more fluids. This may also help prevent an infection. · Your urine may have a pinkish color for a few days after the test. 
When should you call for help? Call your doctor now or seek immediate medical care if: 
? · Your urine is still red or you see blood clots after you have urinated several times. ? · You cannot pass urine 8 hours after the test.  
? · You get a fever or chills. ? · You have pain in your belly or your back just below your rib cage. This is also called flank pain. ? Watch closely for changes in your health, and be sure to contact your doctor if: 
? · You have pain or burning when you urinate. A burning sensation is normal for a day or two after the test. But call if it does not get better. ? · You have a frequent urge to urinate but can pass only small amounts of urine. ? · Your urine is pink, red, or cloudy or smells bad. It is normal for the urine to have a pinkish color for a few days after the test. But call if it does not get better. ? · You do not get better as expected. Where can you learn more? Go to http://good-jerod.info/. Enter E979 in the search box to learn more about \"Cystoscopy: Care Instructions. \" Current as of: May 12, 2017 Content Version: 11.4 © 0322-8626 [employer]. Care instructions adapted under license by [employer] (which disclaims liability or warranty for this information). If you have questions about a medical condition or this instruction, always ask your healthcare professional. [address] any warranty or liability for your use of this information. Please provide this summary of care documentation to your next provider. Your primary care clinician is listed as Jase Martinez. If you have any questions after today's visit, please call [phone].

## 2017-11-07 NOTE — PROGRESS NOTES
Mr. Jose Culver has a reminder for a \"due or due soon\" health maintenance. I have asked that he contact his primary care provider for follow-up on this health maintenance.

## 2017-11-07 NOTE — PROGRESS NOTES
The patient was coming in today for a surveillance cystoscopy. His urine however is 2+ positive for white blood cells and 1+ positive for blood. The patient has not noted specific irritative symptoms but does admit that he is going more often especially at night where he is getting up about twice. He had not been doing that in the past.  He has had no recent flu syndromes. Nevertheless, I am going to postpone the surveillance cystoscopy and do a culture on the urine. The patient understands and agrees. We will also send the urine for cytology      This visit exceeded 10 minutes and greater than 50% was counseling. The patient expresses understanding of the treatment plan and wishes to proceed    This dictation used voice recognition software and there may be mistakes.     Nicole Nelson MD

## 2017-11-07 NOTE — PATIENT INSTRUCTIONS
Cystoscopy: Care Instructions  Your Care Instructions  Cystoscopy is a test. It uses a thin, lighted tube called a cystoscope to see the inside of the bladder and the urethra. The urethra is the tube that carries urine out of the body. This test is helpful because it lets your doctor see areas of your bladder and urethra that are hard to see on X-rays. It can help your doctor find bladder stones, tumors, bleeding, and infection. During this test, your doctor also can take tissue and urine samples. And if your doctor finds small stones or growths, he or she can remove them. In most cases the scope is in the bladder for less than 10 minutes. But the entire test may take 45 minutes or longer. You will probably get local anesthesia. This numbs a small part of your body. Or you may get spinal anesthesia, which numbs more of your body. Once in a while, doctors use general anesthesia. It makes you sleep during surgery. If you get a local anesthetic, you may be able to get up right after the test. But if you had spinal or general anesthesia, you will stay in the recovery room until you are able to walk or you have feeling again in your lower body. This usually takes about an hour. Your doctor may be able to tell you some of the results right after the test. But the complete results may take several days. Follow-up care is a key part of your treatment and safety. Be sure to make and go to all appointments, and call your doctor if you are having problems. It's also a good idea to know your test results and keep a list of the medicines you take. How can you care for yourself at home? Before the test  · If you are having a local anesthetic, you can eat and drink before the test.  · If you are having a spinal or general anesthetic, do not eat or drink anything for at least 8 hours before the test. Tell your doctor what medicines you take.   · If you are not staying overnight in the hospital, make sure you have someone who can drive you home after the test.  After the test  · If your doctor prescribed antibiotics, take them as directed. Do not stop taking them just because you feel better. You need to take the full course of antibiotics. · You may have some burning when you urinate for a day or two after the test. You may feel better if you drink more fluids. This may also help prevent an infection. · Your urine may have a pinkish color for a few days after the test.  When should you call for help? Call your doctor now or seek immediate medical care if:  ? · Your urine is still red or you see blood clots after you have urinated several times. ? · You cannot pass urine 8 hours after the test.   ? · You get a fever or chills. ? · You have pain in your belly or your back just below your rib cage. This is also called flank pain. ? Watch closely for changes in your health, and be sure to contact your doctor if:  ? · You have pain or burning when you urinate. A burning sensation is normal for a day or two after the test. But call if it does not get better. ? · You have a frequent urge to urinate but can pass only small amounts of urine. ? · Your urine is pink, red, or cloudy or smells bad. It is normal for the urine to have a pinkish color for a few days after the test. But call if it does not get better. ? · You do not get better as expected. Where can you learn more? Go to http://good-jerod.info/. Enter N915 in the search box to learn more about \"Cystoscopy: Care Instructions. \"  Current as of: May 12, 2017  Content Version: 11.4  © 8995-8872 [employer]. Care instructions adapted under license by [employer] (which disclaims liability or warranty for this information).  If you have questions about a medical condition or this instruction, always ask your healthcare professional. [address] any warranty or liability for your use of this information.

## 2017-11-10 LAB
BACTERIA SPEC CULT: ABNORMAL
SERVICE CMNT-IMP: ABNORMAL

## 2017-11-10 RX ORDER — NITROFURANTOIN 25; 75 MG/1; MG/1
CAPSULE ORAL
Qty: 20 CAP | Refills: 0 | Status: SHIPPED | OUTPATIENT
Start: 2017-11-20 | End: 2019-01-24

## 2017-11-10 NOTE — PROGRESS NOTES
Inell Rock I spoke too soon. I think this needs to be treated. Please call Yoav Barnes and advise him.   Call in [address] for him to take for the week leading up to the cystoscopy and for three days after (total 10 days)

## 2017-11-10 NOTE — PROGRESS NOTES
Olvin Roldan was called today and notified of results. Patient will start Macrobid 7 days prior to procedure.

## 2017-11-10 NOTE — TELEPHONE ENCOUNTER
RBV Per Dr. Denise Vega 100 mg one twice daily for 10 days . START TAKING MEDICATION 7 DAYS PRIOR TO PROCEDURE. #20 with no refills sent to pharmacy.

## 2017-11-27 ENCOUNTER — OFFICE VISIT (OUTPATIENT)
Dept: UROLOGY | Age: 72
End: 2017-11-27

## 2017-11-27 VITALS
HEART RATE: 77 BPM | DIASTOLIC BLOOD PRESSURE: 79 MMHG | OXYGEN SATURATION: 92 % | HEIGHT: 72 IN | BODY MASS INDEX: 25.6 KG/M2 | WEIGHT: 189 LBS | TEMPERATURE: 97.5 F | SYSTOLIC BLOOD PRESSURE: 154 MMHG

## 2017-11-27 DIAGNOSIS — C67.9 MALIGNANT NEOPLASM OF URINARY BLADDER, UNSPECIFIED SITE (HCC): Primary | ICD-10-CM

## 2017-11-27 LAB
BILIRUB UR QL STRIP: NEGATIVE
GLUCOSE UR-MCNC: NEGATIVE MG/DL
KETONES P FAST UR STRIP-MCNC: NEGATIVE MG/DL
PH UR STRIP: 5 [PH] (ref 4.6–8)
PROT UR QL STRIP: NORMAL
SP GR UR STRIP: 1.02 (ref 1–1.03)
UA UROBILINOGEN AMB POC: NORMAL (ref 0.2–1)
URINALYSIS CLARITY POC: CLEAR
URINALYSIS COLOR POC: YELLOW
URINE BLOOD POC: NORMAL
URINE LEUKOCYTES POC: NORMAL
URINE NITRITES POC: NEGATIVE

## 2017-11-27 NOTE — PROGRESS NOTES
The patient is a 79-year-old male  who had a first bladder cancer determined in February 2016 with 1 tumor of low and the other of high-grade but noninvasive. 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  This nevertheless constitutes the last documented malignancy. The patient had undergone BCG treatment but we elected against it and elected against maintenance protocol at the present time. The patient comes now for a standard 3 month surveillance cystoscopy. He has had no symptoms    The patient is prepared for cystoscopy. He has been counseled regarding the risks which include, but are not limited to, infection, bleeding, injury, failure to diagnose, and possible need for additional procedures. He wishes to proceed, giving his informed consent. Procedure note: The patient is in the slightly inclined supine position where he is prepped and draped in sterile fashion. Lidocaine jelly has been inserted into the urethra and a suitable time has been permitted to elapse for establishment of anesthesia. A flexible cystoscope is inserted and video cystoscopy is carried out with the patient able to observe the monitor. The findings are as follows:  1. Pendulous urethra normal  2. Bulbar urethra normal  3. External sphincter normal  4. Prostate   5. Bladder neck  6. Trigone  7. Bladder wall of the above structures are normal for the patient's age. It should be noted that the previously noted area of erythema is no longer present    Pertinent observations are pointed out to the patient and all the patient's questions are entertained. The procedure is terminated . Full discussion regarding the cystoscopic findings will be carried out. Following removal of the cystoscope, the patient is left in position for a suitable period of time and allowed to dress. Post procedure instructions are given.  The patient tolerated the procedure well.     The patient will return in 3 months for another surveillance cystoscopy

## 2017-11-27 NOTE — MR AVS SNAPSHOT
Visit Information Date & Time Provider Department Dept. Phone Encounter #  
 11/27/2017 10:00 AM Kvng Dugna [phone] Upcoming Health Maintenance Date Due DTaP/Tdap/Td series (1 - Tdap) 4/23/1965 FOBT Q 1 YEAR AGE 50-75 4/23/1994 ZOSTER VACCINE AGE 60> 2/23/2004 GLAUCOMA SCREENING Q2Y 4/23/2009 Pneumococcal 65+ High/Highest Risk (1 of 2 - PCV13) 4/23/2009 MEDICARE YEARLY EXAM 4/23/2009 Influenza Age 5 to Adult 8/1/2017 Allergies as of 11/27/2017  Review Complete On: 11/27/2017 By: Theresa Kauffman LPN No Known Allergies Current Immunizations  Never Reviewed No immunizations on file. Not reviewed this visit You Were Diagnosed With   
  
 Codes Comments Malignant neoplasm of urinary bladder, unspecified site [facility])    -  Primary ICD-10-CM: C67.9 ICD-9-CM: 188. 9 Vitals BP Pulse Temp Height(growth percentile) Weight(growth percentile) SpO2  
 154/79 (BP 1 Location: Left arm, BP Patient Position: Sitting) 77 97.5 °F (36.4 °C) 6' (1.829 m) 189 lb (85.7 kg) 92% BMI Smoking Status 25.63 kg/m2 Never Smoker Vitals History BMI and BSA Data Body Mass Index Body Surface Area  
 25.63 kg/m 2 2.09 m 2 Preferred Pharmacy Pharmacy Name Phone Geremias Escobedo [address] [facility], [address] [place] [address] Road [phone] Your Updated Medication List  
  
   
This list is accurate as of: 11/27/17 10:32 AM.  Always use your most recent med list.  
  
  
  
  
 finasteride 5 mg tablet Commonly known as:  PROSCAR Take 1 Tab by mouth daily. HYDROcodone-acetaminophen  mg tablet Commonly known as:  Mandie Parisian Take 1 Tab by mouth every six (6) hours as needed for Pain. Max Daily Amount: 4 Tabs.  
  
 latanoprost 0.005 % ophthalmic solution Commonly known as:  Demi Pallavi Administer 1 Drop to left eye nightly. lisinopril 5 mg tablet Commonly known as:  Alli Wetzel Take  by mouth daily. nitrofurantoin (macrocrystal-monohydrate) 100 mg capsule Commonly known as:  MACROBID Take one capsule by mouth twice daily for 10 days. START TAKING MEDICATION 7 DAYS PRIOR TO PROCEDURE. PriLOSEC 10 mg capsule Generic drug:  omeprazole Take 10 mg by mouth daily. tamsulosin 0.4 mg capsule Commonly known as:  FLOMAX One cap q d pc ZOCOR 10 mg tablet Generic drug:  simvastatin Take  by mouth nightly. We Performed the Following AMB POC URINALYSIS DIP STICK AUTO W/O MICRO [85747 CPT(R)] Patient Instructions Cystoscopy: Care Instructions Your Care Instructions Cystoscopy is a test. It uses a thin, lighted tube called a cystoscope to see the inside of the bladder and the urethra. The urethra is the tube that carries urine out of the body. This test is helpful because it lets your doctor see areas of your bladder and urethra that are hard to see on X-rays. It can help your doctor find bladder stones, tumors, bleeding, and infection. During this test, your doctor also can take tissue and urine samples. And if your doctor finds small stones or growths, he or she can remove them. In most cases the scope is in the bladder for less than 10 minutes. But the entire test may take 45 minutes or longer. You will probably get local anesthesia. This numbs a small part of your body. Or you may get spinal anesthesia, which numbs more of your body. Once in a while, doctors use general anesthesia. It makes you sleep during surgery. If you get a local anesthetic, you may be able to get up right after the test. But if you had spinal or general anesthesia, you will stay in the recovery room until you are able to walk or you have feeling again in your lower body. This usually takes about an hour.  
Your doctor may be able to tell you some of the results right after the test. But the complete results may take several days. Follow-up care is a key part of your treatment and safety. Be sure to make and go to all appointments, and call your doctor if you are having problems. It's also a good idea to know your test results and keep a list of the medicines you take. How can you care for yourself at home? Before the test 
· If you are having a local anesthetic, you can eat and drink before the test. 
· If you are having a spinal or general anesthetic, do not eat or drink anything for at least 8 hours before the test. Tell your doctor what medicines you take. · If you are not staying overnight in the hospital, make sure you have someone who can drive you home after the test. 
After the test 
· If your doctor prescribed antibiotics, take them as directed. Do not stop taking them just because you feel better. You need to take the full course of antibiotics. · You may have some burning when you urinate for a day or two after the test. You may feel better if you drink more fluids. This may also help prevent an infection. · Your urine may have a pinkish color for a few days after the test. 
When should you call for help? Call your doctor now or seek immediate medical care if: 
? · Your urine is still red or you see blood clots after you have urinated several times. ? · You cannot pass urine 8 hours after the test.  
? · You get a fever or chills. ? · You have pain in your belly or your back just below your rib cage. This is also called flank pain. ? Watch closely for changes in your health, and be sure to contact your doctor if: 
? · You have pain or burning when you urinate. A burning sensation is normal for a day or two after the test. But call if it does not get better. ? · You have a frequent urge to urinate but can pass only small amounts of urine. ? · Your urine is pink, red, or cloudy or smells bad.  It is normal for the urine to have a pinkish color for a few days after the test. But call if it does not get better. ? · You do not get better as expected. Where can you learn more? Go to http://good-jerod.info/. Enter X227 in the search box to learn more about \"Cystoscopy: Care Instructions. \" Current as of: May 12, 2017 Content Version: 11.4 © 0454-5867 [employer]. Care instructions adapted under license by [employer] (which disclaims liability or warranty for this information). If you have questions about a medical condition or this instruction, always ask your healthcare professional. [address] any warranty or liability for your use of this information. Please provide this summary of care documentation to your next provider. Your primary care clinician is listed as Ward Pedraza. If you have any questions after today's visit, please call [phone].

## 2017-11-27 NOTE — PROGRESS NOTES
Mr. Valerie Benavides has a reminder for a \"due or due soon\" health maintenance. I have asked that he contact his primary care provider for follow-up on this health maintenance.

## 2017-11-28 NOTE — PROGRESS NOTES
MIRYAMPatient verbalized understanding of procedure and post procedure instructions. -[facility]  OFFICE PROCEDURE PROGRESS NOTE        Chart reviewed for the following:   Jeremy MICHELLE, have reviewed the History, Physical and updated the Allergic reactions for Ray S [address] performed immediately prior to start of procedure:   Jeremy MICHELLE, have performed the following reviews on [address] prior to the start of the procedure:            * Patient was identified by name and date of birth   * Agreement on procedure being performed was verified  * Risks and Benefits explained to the patient  * Procedure site verified and marked as necessary  * Patient was positioned for comfort  * Consent was signed and verified     Time: 110:38 AM      Date of procedure: 11/28/2017    Procedure performed by:  Deepthi Rosales MD    Provider assisted by: Jeremy Barbour MA    Patient assisted by: Wife    How tolerated by patient: tolerated the procedure well with no complications    Post Procedural Pain Scale: 0 - No Hurt    Comments: nonePatient verbalized understanding of procedure and post procedure instructions.

## 2017-12-07 DIAGNOSIS — N40.0 BENIGN NON-NODULAR PROSTATIC HYPERPLASIA WITHOUT LOWER URINARY TRACT SYMPTOMS: ICD-10-CM

## 2017-12-07 RX ORDER — FINASTERIDE 5 MG/1
5 TABLET, FILM COATED ORAL DAILY
Qty: 90 TAB | Refills: 3 | Status: SHIPPED | OUTPATIENT
Start: 2017-12-07 | End: 2019-02-04 | Stop reason: SDUPTHER

## 2018-02-27 ENCOUNTER — OFFICE VISIT (OUTPATIENT)
Dept: UROLOGY | Age: 73
End: 2018-02-27

## 2018-02-27 ENCOUNTER — HOSPITAL ENCOUNTER (OUTPATIENT)
Dept: LAB | Age: 73
Discharge: HOME | End: 2018-02-27
Payer: MEDICARE

## 2018-02-27 VITALS
SYSTOLIC BLOOD PRESSURE: 138 MMHG | HEART RATE: 71 BPM | DIASTOLIC BLOOD PRESSURE: 90 MMHG | TEMPERATURE: 98.1 F | HEIGHT: 72 IN | OXYGEN SATURATION: 96 %

## 2018-02-27 DIAGNOSIS — C67.9 MALIGNANT NEOPLASM OF URINARY BLADDER, UNSPECIFIED SITE (HCC): Primary | ICD-10-CM

## 2018-02-27 LAB
BILIRUB UR QL STRIP: NEGATIVE
GLUCOSE UR-MCNC: NEGATIVE MG/DL
KETONES P FAST UR STRIP-MCNC: NEGATIVE MG/DL
PH UR STRIP: 5 [PH] (ref 4.6–8)
PROT UR QL STRIP: NEGATIVE
SP GR UR STRIP: 1.02 (ref 1–1.03)
UA UROBILINOGEN AMB POC: NORMAL (ref 0.2–1)
URINALYSIS CLARITY POC: CLEAR
URINALYSIS COLOR POC: YELLOW
URINE BLOOD POC: NORMAL
URINE LEUKOCYTES POC: NEGATIVE
URINE NITRITES POC: NEGATIVE

## 2018-02-27 PROCEDURE — 88112 CYTOPATH CELL ENHANCE TECH: CPT | Performed by: UROLOGY

## 2018-02-27 NOTE — PATIENT INSTRUCTIONS
Cystoscopy: What to Expect at [address]    A cystoscopy is a procedure that lets a doctor look inside of the bladder and the urethra. The urethra is the tube that carries urine from the bladder to outside the body. The doctor uses a thin, lighted tool called a cystoscope. Your bladder is filled with fluid. This stretches the bladder so that your doctor can look closely at the inside of your bladder. After the cystoscopy, your urethra may be sore at first, and it may burn when you urinate for the first few days after the procedure. You may feel the need to urinate more often, and your urine may be pink. These symptoms should get better in 1 or 2 days. You will probably be able to go back to most of your usual activities in 1 or 2 days. This care sheet gives you a general idea about how long it will take for you to recover. But each person recovers at a different pace. Follow the steps below to get better as quickly as possible. How can you care for yourself at home? Activity  ? · Rest when you feel tired. Getting enough sleep will help you recover. ? · Try to walk each day. Start by walking a little more than you did the day before. Bit by bit, increase the amount you walk. Walking boosts blood flow and helps prevent pneumonia and constipation. ? · Avoid strenuous activities, such as bicycle riding, jogging, weight lifting, or aerobic exercise, until your doctor says it is okay. ? · Ask your doctor when you can drive again. ? · Most people are able to return to work within 1 or 2 days after the procedure. ? · You may shower and take baths as usual.   ? · Ask your doctor when it is okay for you to have sex. Diet  ? · You can eat your normal diet. If your stomach is upset, try bland, low-fat foods like plain rice, broiled chicken, toast, and yogurt. ? · Drink plenty of fluids (unless your doctor tells you not to). Medicines  ? · Take pain medicines exactly as directed.   ¨ If the doctor gave you a prescription medicine for pain, take it as prescribed. ¨ If you are not taking a prescription pain medicine, ask your doctor if you can take an over-the-counter medicine. ? · If you think your pain medicine is making you sick to your stomach:  ¨ Take your medicine after meals (unless your doctor has told you not to). ¨ Ask your doctor for a different pain medicine. ? · If your doctor prescribed antibiotics, take them as directed. Do not stop taking them just because you feel better. You need to take the full course of antibiotics. Follow-up care is a key part of your treatment and safety. Be sure to make and go to all appointments, and call your doctor if you are having problems. It's also a good idea to know your test results and keep a list of the medicines you take. When should you call for help? Call 911 anytime you think you may need emergency care. For example, call if:  ? · You passed out (lost consciousness). ? · You have severe trouble breathing. ? · You have sudden chest pain and shortness of breath, or you cough up blood. ? · You have severe belly pain. ?Call your doctor now or seek immediate medical care if:  ? · You are sick to your stomach or cannot keep fluids down. ? · Your urine is still red or you see blood clots after you have urinated several times. ? · You have trouble passing urine or stool, especially if you have pain or swelling in your lower belly. ? · You have signs of a blood clot, such as:  ¨ Pain in your calf, back of the knee, thigh, or groin. ¨ Redness and swelling in your leg or groin. ? · You develop a fever or severe chills. ? · You have pain in your back just below your rib cage. This is called flank pain. ? Watch closely for changes in your health, and be sure to contact your doctor if:  ? · You have pain or burning when you urinate. A burning feeling is normal for a day or two after the test, but call if it does not get better. ? · You have a frequent urge to urinate but can pass only small amounts of urine. ? · Your urine is pink, red, or cloudy, or smells bad. It is normal for the urine to have a pinkish color for a few days after the test, but call if it does not get better. Where can you learn more? Go to http://good-jerod.info/. Enter B588 in the search box to learn more about \"Cystoscopy: What to Expect at Home. \"  Current as of: May 12, 2017  Content Version: 11.4  © 0980-5102 [employer]. Care instructions adapted under license by [employer] (which disclaims liability or warranty for this information). If you have questions about a medical condition or this instruction, always ask your healthcare professional. [address] any warranty or liability for your use of this information.

## 2018-02-27 NOTE — PROGRESS NOTES
Mr. Haylee Fernandes has a reminder for a \"due or due soon\" health maintenance. I have asked that he contact his primary care provider for follow-up on this health maintenance.

## 2018-02-27 NOTE — MR AVS SNAPSHOT
[address] [facility] Mike A [address] [address] 
[phone] Patient: Mitzi Helms MRN: [id number] PUT:2/95/5323 Visit Information Date & Time Provider Department Dept. Phone Encounter #  
 2/27/2018  2:15 PM Kvng Garza [phone] [id number] Follow-up Instructions Return in about 3 months (around 5/27/2018) for surveillance cystoscopy. Follow-up and Disposition History Your Appointments 8/14/2018  9:00 AM  
PROCEDURE with Analilia Og MD  
[facility]) Appt Note: cysto [address] Mike A [address] [address]  
[phone] [address] [facility] [address] Upcoming Health Maintenance Date Due DTaP/Tdap/Td series (1 - Tdap) 4/23/1965 FOBT Q 1 YEAR AGE 50-75 4/23/1994 ZOSTER VACCINE AGE 60> 2/23/2004 GLAUCOMA SCREENING Q2Y 4/23/2009 Pneumococcal 65+ High/Highest Risk (1 of 2 - PCV13) 4/23/2009 MEDICARE YEARLY EXAM 4/23/2009 Influenza Age 5 to Adult 8/1/2017 Allergies as of 2/27/2018  Review Complete On: 2/27/2018 By: Analilia Og MD  
 No Known Allergies Current Immunizations  Never Reviewed No immunizations on file. Not reviewed this visit You Were Diagnosed With   
  
 Codes Comments Malignant neoplasm of urinary bladder, unspecified site [facility])    -  Primary ICD-10-CM: C67.9 ICD-9-CM: 188. 9 Vitals BP Pulse Temp Height(growth percentile) SpO2 Smoking Status 138/90 (BP 1 Location: Left arm, BP Patient Position: Sitting) 71 98.1 °F (36.7 °C) (Oral) 6' (1.829 m) 96% Never Smoker Vitals History Preferred Pharmacy Pharmacy Name Phone Teriloida Estephania [address], [address] [phone] Your Updated Medication List  
  
   
 This list is accurate as of 2/27/18  2:49 PM.  Always use your most recent med list.  
  
  
  
  
 finasteride 5 mg tablet Commonly known as:  PROSCAR Take 1 Tab by mouth daily. HYDROcodone-acetaminophen  mg tablet Commonly known as:  Brianne Rancho Take 1 Tab by mouth every six (6) hours as needed for Pain. Max Daily Amount: 4 Tabs.  
  
 latanoprost 0.005 % ophthalmic solution Commonly known as:  Eber Mackey Administer 1 Drop to left eye nightly. lisinopril 5 mg tablet Commonly known as:  Ata Hauser Take  by mouth daily. nitrofurantoin (macrocrystal-monohydrate) 100 mg capsule Commonly known as:  MACROBID Take one capsule by mouth twice daily for 10 days. START TAKING MEDICATION 7 DAYS PRIOR TO PROCEDURE. PriLOSEC 10 mg capsule Generic drug:  omeprazole Take 10 mg by mouth daily. tamsulosin 0.4 mg capsule Commonly known as:  FLOMAX One cap q d pc ZOCOR 10 mg tablet Generic drug:  simvastatin Take  by mouth nightly. We Performed the Following AMB POC URINALYSIS DIP STICK AUTO W/O MICRO [59518 CPT(R)] Follow-up Instructions Return in about 3 months (around 5/27/2018) for surveillance cystoscopy. Patient Instructions Cystoscopy: What to Expect at [facility] Your Recovery A cystoscopy is a procedure that lets a doctor look inside of the bladder and the urethra. The urethra is the tube that carries urine from the bladder to outside the body. The doctor uses a thin, lighted tool called a cystoscope. Your bladder is filled with fluid. This stretches the bladder so that your doctor can look closely at the inside of your bladder. After the cystoscopy, your urethra may be sore at first, and it may burn when you urinate for the first few days after the procedure. You may feel the need to urinate more often, and your urine may be pink.  These symptoms should get better in 1 or 2 days. You will probably be able to go back to most of your usual activities in 1 or 2 days. This care sheet gives you a general idea about how long it will take for you to recover. But each person recovers at a different pace. Follow the steps below to get better as quickly as possible. How can you care for yourself at home? Activity ? · Rest when you feel tired. Getting enough sleep will help you recover. ? · Try to walk each day. Start by walking a little more than you did the day before. Bit by bit, increase the amount you walk. Walking boosts blood flow and helps prevent pneumonia and constipation. ? · Avoid strenuous activities, such as bicycle riding, jogging, weight lifting, or aerobic exercise, until your doctor says it is okay. ? · Ask your doctor when you can drive again. ? · Most people are able to return to work within 1 or 2 days after the procedure. ? · You may shower and take baths as usual.  
? · Ask your doctor when it is okay for you to have sex. Diet ? · You can eat your normal diet. If your stomach is upset, try bland, low-fat foods like plain rice, broiled chicken, toast, and yogurt. ? · Drink plenty of fluids (unless your doctor tells you not to). Medicines ? · Take pain medicines exactly as directed. ¨ If the doctor gave you a prescription medicine for pain, take it as prescribed. ¨ If you are not taking a prescription pain medicine, ask your doctor if you can take an over-the-counter medicine. ? · If you think your pain medicine is making you sick to your stomach: 
¨ Take your medicine after meals (unless your doctor has told you not to). ¨ Ask your doctor for a different pain medicine. ? · If your doctor prescribed antibiotics, take them as directed. Do not stop taking them just because you feel better. You need to take the full course of antibiotics. Follow-up care is a key part of your treatment and safety.  Be sure to make and go to all appointments, and call your doctor if you are having problems. It's also a good idea to know your test results and keep a list of the medicines you take. When should you call for help? Call 911 anytime you think you may need emergency care. For example, call if: 
? · You passed out (lost consciousness). ? · You have severe trouble breathing. ? · You have sudden chest pain and shortness of breath, or you cough up blood. ? · You have severe belly pain. ?Call your doctor now or seek immediate medical care if: 
? · You are sick to your stomach or cannot keep fluids down. ? · Your urine is still red or you see blood clots after you have urinated several times. ? · You have trouble passing urine or stool, especially if you have pain or swelling in your lower belly. ? · You have signs of a blood clot, such as: 
¨ Pain in your calf, back of the knee, thigh, or groin. ¨ Redness and swelling in your leg or groin. ? · You develop a fever or severe chills. ? · You have pain in your back just below your rib cage. This is called flank pain. ? Watch closely for changes in your health, and be sure to contact your doctor if: 
? · You have pain or burning when you urinate. A burning feeling is normal for a day or two after the test, but call if it does not get better. ? · You have a frequent urge to urinate but can pass only small amounts of urine. ? · Your urine is pink, red, or cloudy, or smells bad. It is normal for the urine to have a pinkish color for a few days after the test, but call if it does not get better. Where can you learn more? Go to http://good-jerod.info/. Enter U733 in the search box to learn more about \"Cystoscopy: What to Expect at Home. \" Current as of: May 12, 2017 Content Version: 11.4 © 3293-9064 [employer].  Care instructions adapted under license by [employer] (which disclaims liability or warranty for this information). If you have questions about a medical condition or this instruction, always ask your healthcare professional. [address] any warranty or liability for your use of this information. Patient Instructions History Introducing [facility] & HEALTH [facility]! Dear Shankar Jonas: Thank you for requesting a [employer] account. Our records indicate that you have previously registered for a [employer] account but its currently inactive. Please call our [employer] support line at [phone]. Additional Information If you have questions, please visit the Frequently Asked Questions section of the [employer] website at [email or web address] Remember, [employer] is NOT to be used for urgent needs. For medical emergencies, dial 911. Now available from your iPhone and Android! Please provide this summary of care documentation to your next provider. Your primary care clinician is listed as Elicia Johnson. If you have any questions after today's visit, please call [phone].

## 2018-02-27 NOTE — PROGRESS NOTES
[facility]  OFFICE PROCEDURE PROGRESS NOTE        Chart reviewed for the following:   Elenita MICHELLE LPN, have reviewed the History, Physical and updated the Allergic reactions for Ray S [address] performed immediately prior to start of procedure:   Tricia Perkins LPN, have performed the following reviews on [address] prior to the start of the procedure:            * Patient was identified by name and date of birth   * Agreement on procedure being performed was verified  * Risks and Benefits explained to the patient  * Procedure site verified and marked as necessary  * Patient was positioned for comfort  * Consent was signed and verified     Time: 3:03PM      Date of procedure: 2/27/2018    Procedure performed by:  Balwinder Akhtar MD    Provider assisted by: Claudell Bicker LPN    Patient assisted by: self    How tolerated by patient: tolerated the procedure well with no complications    Post Procedural Pain Scale: 0 - No Hurt    Comments: none    Patient verbalized understanding of procedure and post procedure instructions.

## 2018-02-27 NOTE — PROGRESS NOTES
The patient is a 76-year-old male  who had a first bladder cancer determined in February 2016 with 1 tumor of low and the other of high-grade but noninvasive. 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  This nevertheless constitutes the last documented malignancy. The patient had undergone BCG treatment but we elected against it and elected against maintenance protocol at the present time. The patient is prepared for cystoscopy. He has been counseled regarding the risks which include, but are not limited to, infection, bleeding, injury, failure to diagnose, and possible need for additional procedures. He wishes to proceed, giving his informed consent. Procedure note: The patient is in the slightly inclined supine position where he is prepped and draped in sterile fashion. Lidocaine jelly has been inserted into the urethra and a suitable time has been permitted to elapse for establishment of anesthesia. A flexible cystoscope is inserted and video cystoscopy is carried out with the patient able to observe the monitor. The findings are as follows:  1. Pendulous urethra normal  2. Bulbar urethra normal  3. External sphincter normal  4. Prostate there is a predictable amount of prostatic lateral lobe encroachment consistent with age  11. Bladder neck open and flexible  6. Trigone normal  7. Bladder wall I do not see any epithelial lesions. The patient has scar tissue that has formed in the dome of the bladder and where previously there had been some erythema, this appears to be fully resolved. Pertinent observations are pointed out to the patient and all the patient's questions are entertained. The procedure is terminated . Full discussion regarding the cystoscopic findings will be carried out. Following removal of the cystoscope, the patient is left in position for a suitable period of time and allowed to dress. Post procedure instructions are given. The patient tolerated the procedure well. Cytology will be sent  Patient will return in 3 months for a surveillance cystoscopy. The time after that, if negative, will constitute a 2 year surveillance protocol and the patient will then be changed to 6 month endoscopies    This visit exceeded 10 minutes and greater than 50% was counseling. The patient expresses understanding of the treatment plan and wishes to proceed    This dictation used voice recognition software and there may be mistakes.     María Griffith MD

## 2018-03-01 NOTE — PROGRESS NOTES
RBV Per Dr. Amish Fontanez order placed for cystoscopy performed today in office by Dr. Amish Fontanez.

## 2018-08-14 ENCOUNTER — HOSPITAL ENCOUNTER (OUTPATIENT)
Dept: LAB | Age: 73
Discharge: HOME | End: 2018-08-14
Payer: MEDICARE

## 2018-08-14 ENCOUNTER — OFFICE VISIT (OUTPATIENT)
Dept: UROLOGY | Age: 73
End: 2018-08-14

## 2018-08-14 VITALS
BODY MASS INDEX: 25.47 KG/M2 | TEMPERATURE: 97.8 F | SYSTOLIC BLOOD PRESSURE: 140 MMHG | HEART RATE: 65 BPM | HEIGHT: 72 IN | OXYGEN SATURATION: 93 % | WEIGHT: 188 LBS | DIASTOLIC BLOOD PRESSURE: 90 MMHG

## 2018-08-14 DIAGNOSIS — C67.9 MALIGNANT NEOPLASM OF URINARY BLADDER, UNSPECIFIED SITE (HCC): Primary | ICD-10-CM

## 2018-08-14 DIAGNOSIS — Z01.818 PRE-OP TESTING: ICD-10-CM

## 2018-08-14 LAB
BILIRUB UR QL STRIP: NEGATIVE
GLUCOSE UR-MCNC: NEGATIVE MG/DL
KETONES P FAST UR STRIP-MCNC: NEGATIVE MG/DL
PH UR STRIP: 5.5 [PH] (ref 4.6–8)
PROT UR QL STRIP: NEGATIVE
SP GR UR STRIP: 1.02 (ref 1–1.03)
UA UROBILINOGEN AMB POC: NORMAL (ref 0.2–1)
URINALYSIS CLARITY POC: CLEAR
URINALYSIS COLOR POC: YELLOW
URINE BLOOD POC: NORMAL
URINE LEUKOCYTES POC: NEGATIVE
URINE NITRITES POC: NEGATIVE

## 2018-08-14 PROCEDURE — 88112 CYTOPATH CELL ENHANCE TECH: CPT | Performed by: UROLOGY

## 2018-08-14 NOTE — PROGRESS NOTES
Mr. Priyanka Davis has a reminder for a \"due or due soon\" health maintenance. I have asked that he contact his primary care provider for follow-up on this health maintenance.

## 2018-08-14 NOTE — MR AVS SNAPSHOT
[address] [facility] Mike A [address] [address] 
[phone] Patient: Radha Haley MRN: [id number] PAF:[id number] Visit Information Date & Time Provider Department Dept. Phone Encounter #  
 8/14/2018  9:00 AM Kvng Nicholson [id number] Your Appointments 10/4/2018  9:15 AM  
Office Visit with Jessica Lazo MD  
[facility] [address] [place] [address]) Appt Note: PO [place] [address] Mike A [address] [address]  
[phone] [address] [place] [address] Upcoming Health Maintenance Date Due DTaP/Tdap/Td series (1 - Tdap) 4/23/1965 FOBT Q 1 YEAR AGE 50-75 4/23/1994 ZOSTER VACCINE AGE 60> 2/23/2004 GLAUCOMA SCREENING Q2Y 4/23/2009 Pneumococcal 65+ High/Highest Risk (1 of 2 - PCV13) 4/23/2009 MEDICARE YEARLY EXAM 3/14/2018 Influenza Age 5 to Adult 8/1/2018 Allergies as of 8/14/2018  Review Complete On: 8/14/2018 By: Jessica Lazo MD  
 No Known Allergies Current Immunizations  Never Reviewed No immunizations on file. Not reviewed this visit You Were Diagnosed With   
  
 Codes Comments Malignant neoplasm of urinary bladder, unspecified site [facility])    -  Primary ICD-10-CM: C67.9 ICD-9-CM: 188.9 Pre-op testing     ICD-10-CM: B90.015 ICD-9-CM: V72.84 Vitals BP Pulse Temp Height(growth percentile) Weight(growth percentile) SpO2  
 140/90 (BP 1 Location: Left arm, BP Patient Position: Sitting) 65 97.8 °F (36.6 °C) (Oral) 6' (1.829 m) 188 lb (85.3 kg) 93% BMI Smoking Status 25.5 kg/m2 Never Smoker Vitals History BMI and BSA Data Body Mass Index Body Surface Area 25.5 kg/m 2 2.08 m 2 Preferred Pharmacy Pharmacy Name Phone Elena [address], [address] [phone] Your Updated Medication List  
  
   
This list is accurate as of 8/14/18 10:04 AM.  Always use your most recent med list.  
  
  
  
  
 finasteride 5 mg tablet Commonly known as:  PROSCAR Take 1 Tab by mouth daily. HYDROcodone-acetaminophen  mg tablet Commonly known as:  March Castles Take 1 Tab by mouth every six (6) hours as needed for Pain. Max Daily Amount: 4 Tabs.  
  
 latanoprost 0.005 % ophthalmic solution Commonly known as:  Edilberto Public Administer 1 Drop to left eye nightly. lisinopril 5 mg tablet Commonly known as:  Josey Primmer Take  by mouth daily. nitrofurantoin (macrocrystal-monohydrate) 100 mg capsule Commonly known as:  MACROBID Take one capsule by mouth twice daily for 10 days. START TAKING MEDICATION 7 DAYS PRIOR TO PROCEDURE. PriLOSEC 10 mg capsule Generic drug:  omeprazole Take 10 mg by mouth daily. tamsulosin 0.4 mg capsule Commonly known as:  FLOMAX One cap q d pc ZOCOR 10 mg tablet Generic drug:  simvastatin Take  by mouth nightly. We Performed the Following AMB POC URINALYSIS DIP STICK AUTO W/O MICRO [70960 CPT(R)] CYSTOSCOPY [65737 CPT(R)] To-Do List   
 08/14/2018 Lab:  CBC W/O DIFF   
  
 08/14/2018 ECG:  EKG, 12 LEAD, INITIAL   
  
 08/14/2018 Lab:  METABOLIC PANEL, BASIC Patient Instructions Cystoscopy: Care Instructions Your Care Instructions Cystoscopy is a test. It uses a thin, lighted tube called a cystoscope to see the inside of the bladder and the urethra. The urethra is the tube that carries urine out of the body. This test is helpful because it lets your doctor see areas of your bladder and urethra that are hard to see on X-rays. It can help your doctor find bladder stones, tumors, bleeding, and infection. During this test, your doctor also can take tissue and urine samples. And if your doctor finds small stones or growths, he or she can remove them. In most cases the scope is in the bladder for less than 10 minutes. But the entire test may take 45 minutes or longer. You will probably get local anesthesia. This numbs a small part of your body. Or you may get spinal anesthesia, which numbs more of your body. Once in a while, doctors use general anesthesia. It makes you sleep during surgery. If you get a local anesthetic, you may be able to get up right after the test. But if you had spinal or general anesthesia, you will stay in the recovery room until you are able to walk or you have feeling again in your lower body. This usually takes about an hour. Your doctor may be able to tell you some of the results right after the test. But the complete results may take several days. Follow-up care is a key part of your treatment and safety. Be sure to make and go to all appointments, and call your doctor if you are having problems. It's also a good idea to know your test results and keep a list of the medicines you take. How can you care for yourself at home? Before the test 
· If you are having a local anesthetic, you can eat and drink before the test. 
· If you are having a spinal or general anesthetic, do not eat or drink anything for at least 8 hours before the test. Tell your doctor what medicines you take. · If you are not staying overnight in the hospital, make sure you have someone who can drive you home after the test. 
After the test 
· If your doctor prescribed antibiotics, take them as directed. Do not stop taking them just because you feel better. You need to take the full course of antibiotics. · You may have some burning when you urinate for a day or two after the test. You may feel better if you drink more fluids. This may also help prevent an infection. · Your urine may have a pinkish color for a few days after the test. 
When should you call for help? Call your doctor now or seek immediate medical care if:   · Your urine is still red or you see blood clots after you have urinated several times.  
  · You cannot pass urine 8 hours after the test.  
  · You get a fever or chills.  
  · You have pain in your belly or your back just below your rib cage. This is also called flank pain.  
 Watch closely for changes in your health, and be sure to contact your doctor if: 
  · You have pain or burning when you urinate. A burning sensation is normal for a day or two after the test. But call if it does not get better.  
  · You have a frequent urge to urinate but can pass only small amounts of urine.  
  · Your urine is pink, red, or cloudy or smells bad. It is normal for the urine to have a pinkish color for a few days after the test. But call if it does not get better.  
  · You do not get better as expected. Where can you learn more? Go to http://good-jerod.info/. Enter A814 in the search box to learn more about \"Cystoscopy: Care Instructions. \" Current as of: May 12, 2017 Content Version: 11.7 © 9700-4220 [employer]. Care instructions adapted under license by [employer] (which disclaims liability or warranty for this information). If you have questions about a medical condition or this instruction, always ask your healthcare professional. [place] [address] any warranty or liability for your use of this information. Introducing [facility]! Dear Varghese Jenkins: Thank you for requesting a [employer] account. Our records indicate that you have previously registered for a [employer] account but its currently inactive. Please call our [employer] support line at [phone]. Additional Information If you have questions, please visit the Frequently Asked Questions section of the [employer] website at [email or web address] Remember, [employer] is NOT to be used for urgent needs. For medical emergencies, dial 911. Now available from your iPhone and Android! Please provide this summary of care documentation to your next provider. Your primary care clinician is listed as Doni Mckay. If you have any questions after today's visit, please call [phone].

## 2018-08-14 NOTE — PATIENT INSTRUCTIONS
Cystoscopy: Care Instructions  Your Care Instructions  Cystoscopy is a test. It uses a thin, lighted tube called a cystoscope to see the inside of the bladder and the urethra. The urethra is the tube that carries urine out of the body. This test is helpful because it lets your doctor see areas of your bladder and urethra that are hard to see on X-rays. It can help your doctor find bladder stones, tumors, bleeding, and infection. During this test, your doctor also can take tissue and urine samples. And if your doctor finds small stones or growths, he or she can remove them. In most cases the scope is in the bladder for less than 10 minutes. But the entire test may take 45 minutes or longer. You will probably get local anesthesia. This numbs a small part of your body. Or you may get spinal anesthesia, which numbs more of your body. Once in a while, doctors use general anesthesia. It makes you sleep during surgery. If you get a local anesthetic, you may be able to get up right after the test. But if you had spinal or general anesthesia, you will stay in the recovery room until you are able to walk or you have feeling again in your lower body. This usually takes about an hour. Your doctor may be able to tell you some of the results right after the test. But the complete results may take several days. Follow-up care is a key part of your treatment and safety. Be sure to make and go to all appointments, and call your doctor if you are having problems. It's also a good idea to know your test results and keep a list of the medicines you take. How can you care for yourself at home? Before the test  · If you are having a local anesthetic, you can eat and drink before the test.  · If you are having a spinal or general anesthetic, do not eat or drink anything for at least 8 hours before the test. Tell your doctor what medicines you take.   · If you are not staying overnight in the hospital, make sure you have someone who can drive you home after the test.  After the test  · If your doctor prescribed antibiotics, take them as directed. Do not stop taking them just because you feel better. You need to take the full course of antibiotics. · You may have some burning when you urinate for a day or two after the test. You may feel better if you drink more fluids. This may also help prevent an infection. · Your urine may have a pinkish color for a few days after the test.  When should you call for help? Call your doctor now or seek immediate medical care if:    · Your urine is still red or you see blood clots after you have urinated several times.     · You cannot pass urine 8 hours after the test.     · You get a fever or chills.     · You have pain in your belly or your back just below your rib cage. This is also called flank pain.    Watch closely for changes in your health, and be sure to contact your doctor if:    · You have pain or burning when you urinate. A burning sensation is normal for a day or two after the test. But call if it does not get better.     · You have a frequent urge to urinate but can pass only small amounts of urine.     · Your urine is pink, red, or cloudy or smells bad. It is normal for the urine to have a pinkish color for a few days after the test. But call if it does not get better.     · You do not get better as expected. Where can you learn more? Go to http://good-jerod.info/. Enter H314 in the search box to learn more about \"Cystoscopy: Care Instructions. \"  Current as of: May 12, 2017  Content Version: 11.7  © 5618-7101 [employer]. Care instructions adapted under license by [employer] (which disclaims liability or warranty for this information).  If you have questions about a medical condition or this instruction, always ask your healthcare professional. [address] any warranty or liability for your use of this information.

## 2018-08-14 NOTE — PROGRESS NOTES
[facility]  OFFICE PROCEDURE PROGRESS NOTE        Chart reviewed for the following:   IAgueda LPN, have reviewed the History, Physical and updated the Allergic reactions for Ray S [address] performed immediately prior to start of procedure:   Prasanna Rosado LPN, have performed the following reviews on [address] prior to the start of the procedure:            * Patient was identified by name and date of birth   * Agreement on procedure being performed was verified  * Risks and Benefits explained to the patient  * Procedure site verified and marked as necessary  * Patient was positioned for comfort  * Consent was signed and verified     Time: 9:55AM      Date of procedure: 8/14/2018    Procedure performed by:  Cheko Valerio MD    Provider assisted by: Cyndy Barber LPN    Patient assisted by: self    How tolerated by patient: tolerated the procedure well with no complications    Post Procedural Pain Scale: 0 - No Hurt    Comments: none    Patient verbalized understanding of procedure and post procedure instructions.

## 2018-08-14 NOTE — PROGRESS NOTES
RBV per Dr. Arsen Schafer urine for cytology.    Reflex to [address] if atypical.

## 2018-08-14 NOTE — PROGRESS NOTES
[address]    Chief Complaint   Patient presents with    Cystoscopy    Bladder Cancer       History and Physical    Patient is a 78-year-old male  who is known to this office. 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 The patient has had BCG in the past.  The patient last had a surveillance cystoscopy in February of this year. The patient was supposed to come back for a 3 month surveillance cystoscopy but for some reason this was not accomplished. The patient comes now for surveillance cystoscopy and notes no symptoms    Past Medical History:   Diagnosis Date    Elevated prostate specific antigen (PSA)     Essential hypertension     Hiatal hernia     Nodular prostate      Patient Active Problem List   Diagnosis Code    Nodular prostate 600.1    Elevated prostate specific antigen (PSA) R97.20    Bladder tumor D49.4     Past Surgical History:   Procedure Laterality Date    HX OTHER SURGICAL      MELANOMA EXCISION FROM THE NOSE    HX RETINAL DETACHMENT REPAIR Right 2010    HX UROLOGICAL  2/2016    Cystoscopy-bladder tumor removal    IN PROSTATE BIOPSY, NEEDLE, SATURATION SAMPLING       Current Outpatient Prescriptions   Medication Sig Dispense Refill    finasteride (PROSCAR) 5 mg tablet Take 1 Tab by mouth daily. 90 Tab 3    latanoprost (XALATAN) 0.005 % ophthalmic solution Administer 1 Drop to left eye nightly.  omeprazole (PRILOSEC) 10 mg capsule Take 10 mg by mouth daily.  simvastatin (ZOCOR) 10 mg tablet Take  by mouth nightly.  lisinopril (PRINIVIL, ZESTRIL) 5 mg tablet Take  by mouth daily.  nitrofurantoin, macrocrystal-monohydrate, (MACROBID) 100 mg capsule Take one capsule by mouth twice daily for 10 days.   START TAKING MEDICATION 7 DAYS PRIOR TO PROCEDURE. 20 Cap 0    HYDROcodone-acetaminophen (NORCO)  mg tablet Take 1 Tab by mouth every six (6) hours as needed for Pain. Max Daily Amount: 4 Tabs. 40 Tab 0    tamsulosin (FLOMAX) 0.4 mg capsule One cap q d pc 90 Cap 3     No Known Allergies  Social History     Social History    Marital status:      Spouse name: N/A    Number of children: N/A    Years of education: N/A     Occupational History    Not on file. Social History Main Topics    Smoking status: Never Smoker    Smokeless tobacco: Never Used    Alcohol use Yes      Comment: OCASSIONAL-WINE    Drug use: No    Sexual activity: Yes     Other Topics Concern    Not on file     Social History Narrative      Family History   Problem Relation Age of Onset    Hypertension Mother     Cancer Father              Visit Vitals    /90 (BP 1 Location: Left arm, BP Patient Position: Sitting)    Pulse 65    Temp 97.8 °F (36.6 °C) (Oral)    Ht 6' (1.829 m)    Wt 188 lb (85.3 kg)    SpO2 93%    BMI 25.5 kg/m2     Physical        Gen: WDWN adult NAD  Head  : normocephalic,  Normal ROM; eyes without normal pupils, EOMs, no masses;  conjunctiva normal  Neck: normal movement,  no evident mass,  No evident adenopathy, trachea midline,  Lungs clear to auscultation with no rales or ronchi or rubs  Cardiac NSR with no murmur, rub, extra sounds  Abd :bowel sounds normal, no masses, tenderness, organomegaly  Flanks     -    Extremities- no edema, arthritis, deformity, swelling  Psych- oriented, no evident anxiety, no cognitive impairment evident  Urine is trace positive for blood      The patient is prepared for cystoscopy. He has been counseled regarding the risks which include, but are not limited to, infection, bleeding, injury, failure to diagnose, and possible need for additional procedures. He wishes to proceed, giving his informed consent. Procedure note: The patient is in the slightly inclined supine position where he is prepped and draped in sterile fashion.  Lidocaine jelly has been inserted into the urethra and a suitable time has been permitted to elapse for establishment of anesthesia. A flexible cystoscope is inserted and video cystoscopy is carried out with the patient able to observe the monitor. The findings are as follows:  1. Pendulous urethra normal  2. Bulbar urethra normal  3. External sphincter normal  4. Prostate   5. Bladder neck  6. Trigone  7. Bladder wall all above structures are normal and appropriate for age. There is predictable scarring at the dome of the bladder. On the left anterior wall just above the bladder neck there is a recurrence of transitional cell carcinoma of the urinary bladder. This is perhaps 2 cm in size    Pertinent observations are pointed out to the patient and all the patient's questions are entertained. The procedure is terminated . Full discussion regarding the cystoscopic findings will be carried out. Following removal of the cystoscope, the patient is left in position for a suitable period of time and allowed to dress. Post procedure instructions are given. The patient tolerated the procedure well. Impression/ PLAN  History of transitional cell carcinoma the urinary bladder post resections with the last one 2 years ago and now with a low-grade low stage recurrence    I talked with the patient and the wife. We will proceed with transurethral resection of the tumor and mitomycin-C    Nature and risks again discussed            This visit exceeded 25 minutes and >50% was counselling  The patient understands the discussion and plan    PLEASE NOTE:      This document has been produced using voice recognition software.   Unrecognized errors in transcription may be present    Sandra Sigala MD

## 2018-08-23 RX ORDER — SODIUM CHLORIDE 9 MG/ML
100 INJECTION, SOLUTION INTRAVENOUS CONTINUOUS
Status: CANCELLED | OUTPATIENT
Start: 2018-08-23

## 2018-08-23 RX ORDER — CIPROFLOXACIN 2 MG/ML
400 INJECTION, SOLUTION INTRAVENOUS ONCE
Status: CANCELLED | OUTPATIENT
Start: 2018-08-23 | End: 2018-08-23

## 2018-09-17 ENCOUNTER — HOSPITAL ENCOUNTER (OUTPATIENT)
Dept: PREADMISSION TESTING | Age: 73
Discharge: HOME | End: 2018-09-17
Payer: MEDICARE

## 2018-09-17 DIAGNOSIS — Z01.818 PRE-OP TESTING: ICD-10-CM

## 2018-09-17 DIAGNOSIS — C67.9 MALIGNANT NEOPLASM OF URINARY BLADDER, UNSPECIFIED SITE (HCC): ICD-10-CM

## 2018-09-17 LAB
ANION GAP SERPL CALC-SCNC: 8 MMOL/L (ref 3–18)
BUN SERPL-MCNC: 21 MG/DL (ref 7–18)
BUN/CREAT SERPL: 16 (ref 12–20)
CALCIUM SERPL-MCNC: 9.1 MG/DL (ref 8.5–10.1)
CHLORIDE SERPL-SCNC: 108 MMOL/L (ref 100–108)
CO2 SERPL-SCNC: 26 MMOL/L (ref 21–32)
CREAT SERPL-MCNC: 1.32 MG/DL (ref 0.6–1.3)
ERYTHROCYTE [DISTWIDTH] IN BLOOD BY AUTOMATED COUNT: 12.4 % (ref 11.6–14.5)
GLUCOSE SERPL-MCNC: 82 MG/DL (ref 74–99)
HCT VFR BLD AUTO: 44.1 % (ref 36–48)
HGB BLD-MCNC: 14.8 G/DL (ref 13–16)
MCH RBC QN AUTO: 31.6 PG (ref 24–34)
MCHC RBC AUTO-ENTMCNC: 33.6 G/DL (ref 31–37)
MCV RBC AUTO: 94.2 FL (ref 74–97)
PLATELET # BLD AUTO: 179 K/UL (ref 135–420)
PMV BLD AUTO: 11.7 FL (ref 9.2–11.8)
POTASSIUM SERPL-SCNC: 4.7 MMOL/L (ref 3.5–5.5)
RBC # BLD AUTO: 4.68 M/UL (ref 4.7–5.5)
SODIUM SERPL-SCNC: 142 MMOL/L (ref 136–145)
WBC # BLD AUTO: 9.2 K/UL (ref 4.6–13.2)

## 2018-09-17 PROCEDURE — 80048 BASIC METABOLIC PNL TOTAL CA: CPT | Performed by: UROLOGY

## 2018-09-17 PROCEDURE — 85027 COMPLETE CBC AUTOMATED: CPT | Performed by: UROLOGY

## 2018-09-17 PROCEDURE — 93005 ELECTROCARDIOGRAM TRACING: CPT

## 2018-09-17 PROCEDURE — 36415 COLL VENOUS BLD VENIPUNCTURE: CPT | Performed by: UROLOGY

## 2018-09-17 NOTE — PROGRESS NOTES
This addendum is to clarify my previous narrative. There was no error or oversight on the part of the patient. I believe I spoke with the  and meant to say 'return in three months and in six months he will be at his cancer anniversary;. I probably implied to the  for patient to return for a six month surveillance cystoscopy instead of a three month. My error.

## 2018-09-18 ENCOUNTER — ANESTHESIA EVENT (OUTPATIENT)
Dept: SURGERY | Age: 73
End: 2018-09-18
Payer: MEDICARE

## 2018-09-18 LAB
ATRIAL RATE: 60 BPM
CALCULATED P AXIS, ECG09: 48 DEGREES
CALCULATED R AXIS, ECG10: 72 DEGREES
CALCULATED T AXIS, ECG11: 56 DEGREES
DIAGNOSIS, 93000: NORMAL
P-R INTERVAL, ECG05: 170 MS
Q-T INTERVAL, ECG07: 396 MS
QRS DURATION, ECG06: 74 MS
QTC CALCULATION (BEZET), ECG08: 396 MS
VENTRICULAR RATE, ECG03: 60 BPM

## 2018-09-18 NOTE — H&P
History and Physical   
Patient is a 78-year-old male  who is known to this office. 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 The patient has had BCG in the past.  The patient last had a surveillance cystoscopy in February of this year. The patient was supposed to come back for a 3 month surveillance cystoscopy but for some reason this was not accomplished. 
  
The patient comes now for surveillance cystoscopy and notes no symptoms 
  
    
Past Medical History:  
Diagnosis Date  Elevated prostate specific antigen (PSA)    
 Essential hypertension    
 Hiatal hernia    
 Nodular prostate    
  
    
Patient Active Problem List  
Diagnosis Code  Nodular prostate 600.1  Elevated prostate specific antigen (PSA) R97.20  Bladder tumor D49. 4  
  
     
Past Surgical History:  
Procedure Laterality Date  HX OTHER SURGICAL      
  MELANOMA EXCISION FROM THE NOSE  
 HX RETINAL DETACHMENT REPAIR Right 2010  HX UROLOGICAL   2/2016  
  Cystoscopy-bladder tumor removal  
 HI PROSTATE BIOPSY, NEEDLE, SATURATION SAMPLING      
  
      
Current Outpatient Prescriptions Medication Sig Dispense Refill  finasteride (PROSCAR) 5 mg tablet Take 1 Tab by mouth daily. 90 Tab 3  
 latanoprost (XALATAN) 0.005 % ophthalmic solution Administer 1 Drop to left eye nightly.      
 omeprazole (PRILOSEC) 10 mg capsule Take 10 mg by mouth daily.      
 simvastatin (ZOCOR) 10 mg tablet Take  by mouth nightly.      
 lisinopril (PRINIVIL, ZESTRIL) 5 mg tablet Take  by mouth daily.      
 nitrofurantoin, macrocrystal-monohydrate, (MACROBID) 100 mg capsule Take one capsule by mouth twice daily for 10 days.   START TAKING MEDICATION 7 DAYS PRIOR TO PROCEDURE. 20 Cap 0  
 HYDROcodone-acetaminophen (NORCO)  mg tablet Take 1 Tab by mouth every six (6) hours as needed for Pain. Max Daily Amount: 4 Tabs. 40 Tab 0  
 tamsulosin (FLOMAX) 0.4 mg capsule One cap q d pc 90 Cap 3  
  
No Known Allergies Social History  
  
Social History  Marital status:   
    Spouse name: N/A  
 Number of children: N/A  
 Years of education: N/A  
  
   
Occupational History  Not on file.  
  
       
Social History Main Topics  Smoking status: Never Smoker  Smokeless tobacco: Never Used  Alcohol use Yes  
      Comment: OCASSIONAL-WINE   
 Drug use: No   
 Sexual activity: Yes   
  
    
Other Topics Concern  Not on file  
  
Social History Narrative Family History Problem Relation Age of Onset  Hypertension Mother    
 Cancer Father    
  
  
  
  
  
    
Visit Vitals  /90 (BP 1 Location: Left arm, BP Patient Position: Sitting)  Pulse 65  Temp 97.8 °F (36.6 °C) (Oral)  Ht 6' (1.829 m)  Wt 188 lb (85.3 kg)  SpO2 93%  BMI 25.5 kg/m2  
  
Physical   
  
  
Gen: WDWN adult NAD Head  : normocephalic,  Normal ROM; eyes without normal pupils, EOMs, no masses;  conjunctiva normal 
Neck: normal movement,  no evident mass,  No evident adenopathy, trachea midline, 
Lungs clear to auscultation with no rales or ronchi or rubs Cardiac NSR with no murmur, rub, extra sounds Abd :bowel sounds normal, no masses, tenderness, organomegaly Flanks    
- 
  
Extremities- no edema, arthritis, deformity, swelling Psych- oriented, no evident anxiety, no cognitive impairment evident Urine is trace positive for blood 
  
  
The patient is prepared for cystoscopy. He has been counseled regarding the risks which include, but are not limited to, infection, bleeding, injury, failure to diagnose, and possible need for additional procedures.  He wishes to proceed, giving his informed consent. 
  
Procedure note: 
  
                      The patient is in the slightly inclined supine position where he is prepped and draped in sterile fashion. Lidocaine jelly has been inserted into the urethra and a suitable time has been permitted to elapse for establishment of anesthesia. A flexible cystoscope is inserted and video cystoscopy is carried out with the patient able to observe the monitor. The findings are as follows: 1. Pendulous urethra normal 
2. Bulbar urethra normal 
3. External sphincter normal 
4. Prostate 5. Bladder neck 6. Trigone 7. Bladder wall all above structures are normal and appropriate for age. There is predictable scarring at the dome of the bladder. On the left anterior wall just above the bladder neck there is a recurrence of transitional cell carcinoma of the urinary bladder. This is perhaps 2 cm in size 
  
Pertinent observations are pointed out to the patient and all the patient's questions are entertained. The procedure is terminated . 
  
Full discussion regarding the cystoscopic findings will be carried out. Following removal of the cystoscope, the patient is left in position for a suitable period of time and allowed to dress. Post procedure instructions are given. The patient tolerated the procedure well. 
  
  
  
  
  
  
  
Impression/ PLAN History of transitional cell carcinoma the urinary bladder post resections with the last one 2 years ago and now with a low-grade low stage recurrence 
  
I talked with the patient and the wife. We will proceed with transurethral resection of the tumor and mitomycin-C 
  
Nature and risks again discussed

## 2018-09-19 ENCOUNTER — ANESTHESIA (OUTPATIENT)
Dept: SURGERY | Age: 73
End: 2018-09-19
Payer: MEDICARE

## 2018-09-19 ENCOUNTER — HOSPITAL ENCOUNTER (OUTPATIENT)
Age: 73
Setting detail: OUTPATIENT SURGERY
Discharge: HOME | End: 2018-09-19
Attending: UROLOGY | Admitting: UROLOGY
Payer: MEDICARE

## 2018-09-19 VITALS
HEIGHT: 71 IN | SYSTOLIC BLOOD PRESSURE: 132 MMHG | BODY MASS INDEX: 25.83 KG/M2 | HEART RATE: 54 BPM | WEIGHT: 184.5 LBS | OXYGEN SATURATION: 94 % | TEMPERATURE: 97 F | DIASTOLIC BLOOD PRESSURE: 84 MMHG | RESPIRATION RATE: 18 BRPM

## 2018-09-19 DIAGNOSIS — D49.4 BLADDER TUMOR: Primary | ICD-10-CM

## 2018-09-19 PROCEDURE — 74011000250 HC RX REV CODE- 250

## 2018-09-19 PROCEDURE — 76210000021 HC REC RM PH II 0.5 TO 1 HR: Performed by: UROLOGY

## 2018-09-19 PROCEDURE — 74011000258 HC RX REV CODE- 258: Performed by: UROLOGY

## 2018-09-19 PROCEDURE — 74011250636 HC RX REV CODE- 250/636: Performed by: UROLOGY

## 2018-09-19 PROCEDURE — 76060000032 HC ANESTHESIA 0.5 TO 1 HR: Performed by: UROLOGY

## 2018-09-19 PROCEDURE — 77030029290 HC ELECTRD LP CUT OCOA -F: Performed by: UROLOGY

## 2018-09-19 PROCEDURE — 74011250636 HC RX REV CODE- 250/636

## 2018-09-19 PROCEDURE — 77030010509 HC AIRWY LMA MSK TELE -A: Performed by: ANESTHESIOLOGY

## 2018-09-19 PROCEDURE — 77030020782 HC GWN BAIR PAWS FLX 3M -B: Performed by: UROLOGY

## 2018-09-19 PROCEDURE — 74011250637 HC RX REV CODE- 250/637: Performed by: UROLOGY

## 2018-09-19 PROCEDURE — 77030032490 HC SLV COMPR SCD KNE COVD -B: Performed by: UROLOGY

## 2018-09-19 PROCEDURE — 76210000016 HC OR PH I REC 1 TO 1.5 HR: Performed by: UROLOGY

## 2018-09-19 PROCEDURE — 74011000250 HC RX REV CODE- 250: Performed by: NURSE ANESTHETIST, CERTIFIED REGISTERED

## 2018-09-19 PROCEDURE — 77030010545: Performed by: UROLOGY

## 2018-09-19 PROCEDURE — 74011250636 HC RX REV CODE- 250/636: Performed by: NURSE ANESTHETIST, CERTIFIED REGISTERED

## 2018-09-19 PROCEDURE — 77030031458 HC ELECTRD TUR BTTN OCOA -F: Performed by: UROLOGY

## 2018-09-19 PROCEDURE — 77030038020 HC MANFLD NEPTUNE STRY -B: Performed by: UROLOGY

## 2018-09-19 PROCEDURE — 77030012863 HC BG URIN LEG HOLL -A: Performed by: UROLOGY

## 2018-09-19 PROCEDURE — 88307 TISSUE EXAM BY PATHOLOGIST: CPT | Performed by: UROLOGY

## 2018-09-19 PROCEDURE — 76010000138 HC OR TIME 0.5 TO 1 HR: Performed by: UROLOGY

## 2018-09-19 PROCEDURE — 77030018836 HC SOL IRR NACL ICUM -A: Performed by: UROLOGY

## 2018-09-19 PROCEDURE — 77030019927 HC TBNG IRR CYSTO BAXT -A: Performed by: UROLOGY

## 2018-09-19 PROCEDURE — 77030034696 HC CATH URETH FOL 2W BARD -A: Performed by: UROLOGY

## 2018-09-19 RX ORDER — SODIUM CHLORIDE, SODIUM LACTATE, POTASSIUM CHLORIDE, CALCIUM CHLORIDE 600; 310; 30; 20 MG/100ML; MG/100ML; MG/100ML; MG/100ML
75 INJECTION, SOLUTION INTRAVENOUS CONTINUOUS
Status: DISCONTINUED | OUTPATIENT
Start: 2018-09-19 | End: 2018-09-19 | Stop reason: HOSPADM

## 2018-09-19 RX ORDER — HYDROCODONE BITARTRATE AND ACETAMINOPHEN 5; 325 MG/1; MG/1
1 TABLET ORAL
Status: COMPLETED | OUTPATIENT
Start: 2018-09-19 | End: 2018-09-19

## 2018-09-19 RX ORDER — CIPROFLOXACIN 2 MG/ML
400 INJECTION, SOLUTION INTRAVENOUS ONCE
Status: COMPLETED | OUTPATIENT
Start: 2018-09-19 | End: 2018-09-19

## 2018-09-19 RX ORDER — FENTANYL CITRATE 50 UG/ML
50 INJECTION, SOLUTION INTRAMUSCULAR; INTRAVENOUS AS NEEDED
Status: DISCONTINUED | OUTPATIENT
Start: 2018-09-19 | End: 2018-09-19 | Stop reason: HOSPADM

## 2018-09-19 RX ORDER — EPHEDRINE SULFATE 50 MG/ML
INJECTION, SOLUTION INTRAVENOUS AS NEEDED
Status: DISCONTINUED | OUTPATIENT
Start: 2018-09-19 | End: 2018-09-19 | Stop reason: HOSPADM

## 2018-09-19 RX ORDER — SODIUM CHLORIDE 0.9 % (FLUSH) 0.9 %
5-10 SYRINGE (ML) INJECTION EVERY 8 HOURS
Status: DISCONTINUED | OUTPATIENT
Start: 2018-09-19 | End: 2018-09-19 | Stop reason: HOSPADM

## 2018-09-19 RX ORDER — HYDROCODONE BITARTRATE AND ACETAMINOPHEN 5; 325 MG/1; MG/1
1 TABLET ORAL
Qty: 28 TAB | Refills: 0 | Status: SHIPPED | OUTPATIENT
Start: 2018-09-19 | End: 2019-01-24

## 2018-09-19 RX ORDER — NALOXONE HYDROCHLORIDE 0.4 MG/ML
0.1 INJECTION, SOLUTION INTRAMUSCULAR; INTRAVENOUS; SUBCUTANEOUS ONCE
Status: DISCONTINUED | OUTPATIENT
Start: 2018-09-19 | End: 2018-09-19 | Stop reason: HOSPADM

## 2018-09-19 RX ORDER — DEXAMETHASONE SODIUM PHOSPHATE 4 MG/ML
INJECTION, SOLUTION INTRA-ARTICULAR; INTRALESIONAL; INTRAMUSCULAR; INTRAVENOUS; SOFT TISSUE AS NEEDED
Status: DISCONTINUED | OUTPATIENT
Start: 2018-09-19 | End: 2018-09-19 | Stop reason: HOSPADM

## 2018-09-19 RX ORDER — SODIUM CHLORIDE 9 MG/ML
100 INJECTION, SOLUTION INTRAVENOUS CONTINUOUS
Status: DISCONTINUED | OUTPATIENT
Start: 2018-09-19 | End: 2018-09-19 | Stop reason: HOSPADM

## 2018-09-19 RX ORDER — ONDANSETRON 2 MG/ML
INJECTION INTRAMUSCULAR; INTRAVENOUS AS NEEDED
Status: DISCONTINUED | OUTPATIENT
Start: 2018-09-19 | End: 2018-09-19 | Stop reason: HOSPADM

## 2018-09-19 RX ORDER — FENTANYL CITRATE 50 UG/ML
INJECTION, SOLUTION INTRAMUSCULAR; INTRAVENOUS AS NEEDED
Status: DISCONTINUED | OUTPATIENT
Start: 2018-09-19 | End: 2018-09-19 | Stop reason: HOSPADM

## 2018-09-19 RX ORDER — FENTANYL CITRATE 50 UG/ML
INJECTION, SOLUTION INTRAMUSCULAR; INTRAVENOUS
Status: COMPLETED
Start: 2018-09-19 | End: 2018-09-19

## 2018-09-19 RX ORDER — LIDOCAINE HYDROCHLORIDE 20 MG/ML
INJECTION, SOLUTION EPIDURAL; INFILTRATION; INTRACAUDAL; PERINEURAL AS NEEDED
Status: DISCONTINUED | OUTPATIENT
Start: 2018-09-19 | End: 2018-09-19 | Stop reason: HOSPADM

## 2018-09-19 RX ORDER — SODIUM CHLORIDE 0.9 % (FLUSH) 0.9 %
5-10 SYRINGE (ML) INJECTION AS NEEDED
Status: DISCONTINUED | OUTPATIENT
Start: 2018-09-19 | End: 2018-09-19 | Stop reason: HOSPADM

## 2018-09-19 RX ORDER — CIPROFLOXACIN 500 MG/1
500 TABLET ORAL 2 TIMES DAILY
Qty: 20 TAB | Refills: 0 | Status: SHIPPED | OUTPATIENT
Start: 2018-09-19 | End: 2018-09-29

## 2018-09-19 RX ORDER — MIDAZOLAM HYDROCHLORIDE 1 MG/ML
INJECTION, SOLUTION INTRAMUSCULAR; INTRAVENOUS AS NEEDED
Status: DISCONTINUED | OUTPATIENT
Start: 2018-09-19 | End: 2018-09-19 | Stop reason: HOSPADM

## 2018-09-19 RX ORDER — PROPOFOL 10 MG/ML
INJECTION, EMULSION INTRAVENOUS AS NEEDED
Status: DISCONTINUED | OUTPATIENT
Start: 2018-09-19 | End: 2018-09-19 | Stop reason: HOSPADM

## 2018-09-19 RX ADMIN — FENTANYL CITRATE 50 MCG: 50 INJECTION, SOLUTION INTRAMUSCULAR; INTRAVENOUS at 09:04

## 2018-09-19 RX ADMIN — FENTANYL CITRATE 50 MCG: 50 INJECTION, SOLUTION INTRAMUSCULAR; INTRAVENOUS at 10:23

## 2018-09-19 RX ADMIN — PROPOFOL 180 MG: 10 INJECTION, EMULSION INTRAVENOUS at 09:04

## 2018-09-19 RX ADMIN — CIPROFLOXACIN 400 MG: 2 INJECTION, SOLUTION INTRAVENOUS at 09:00

## 2018-09-19 RX ADMIN — MIDAZOLAM HYDROCHLORIDE 1 MG: 1 INJECTION, SOLUTION INTRAMUSCULAR; INTRAVENOUS at 08:57

## 2018-09-19 RX ADMIN — Medication 10 ML: at 08:14

## 2018-09-19 RX ADMIN — SODIUM CHLORIDE 20 MG: 9 INJECTION INTRAMUSCULAR; INTRAVENOUS; SUBCUTANEOUS at 08:26

## 2018-09-19 RX ADMIN — EPHEDRINE SULFATE 5 MG: 50 INJECTION, SOLUTION INTRAVENOUS at 09:18

## 2018-09-19 RX ADMIN — SODIUM CHLORIDE, SODIUM LACTATE, POTASSIUM CHLORIDE, AND CALCIUM CHLORIDE 75 ML/HR: 600; 310; 30; 20 INJECTION, SOLUTION INTRAVENOUS at 08:15

## 2018-09-19 RX ADMIN — FENTANYL CITRATE 50 MCG: 50 INJECTION, SOLUTION INTRAMUSCULAR; INTRAVENOUS at 10:03

## 2018-09-19 RX ADMIN — DEXAMETHASONE SODIUM PHOSPHATE 4 MG: 4 INJECTION, SOLUTION INTRA-ARTICULAR; INTRALESIONAL; INTRAMUSCULAR; INTRAVENOUS; SOFT TISSUE at 09:10

## 2018-09-19 RX ADMIN — MITOMYCIN 40 MG: 20 INJECTION, POWDER, LYOPHILIZED, FOR SOLUTION INTRAVENOUS at 09:38

## 2018-09-19 RX ADMIN — PROPOFOL 20 MG: 10 INJECTION, EMULSION INTRAVENOUS at 09:19

## 2018-09-19 RX ADMIN — ONDANSETRON 4 MG: 2 INJECTION INTRAMUSCULAR; INTRAVENOUS at 09:30

## 2018-09-19 RX ADMIN — FENTANYL CITRATE 50 MCG: 50 INJECTION INTRAMUSCULAR; INTRAVENOUS at 10:03

## 2018-09-19 RX ADMIN — HYDROCODONE BITARTRATE AND ACETAMINOPHEN 1 TABLET: 5; 325 TABLET ORAL at 11:43

## 2018-09-19 RX ADMIN — LIDOCAINE HYDROCHLORIDE 40 MG: 20 INJECTION, SOLUTION EPIDURAL; INFILTRATION; INTRACAUDAL; PERINEURAL at 09:04

## 2018-09-19 NOTE — OP NOTES
[facility]  OPERATIVE REPORT    Name:SALOME Tran  MR#: [id number]  : 1945  ACCOUNT #: [de-identified]   DATE OF SERVICE: 2018    PREOPERATIVE DIAGNOSIS:  Bladder tumor. POSTOPERATIVE DIAGNOSIS:  Bladder tumor. PROCEDURES PERFORMED:  Transurethral resection of bladder tumor and removal of prostatic diverticular stones and instillation of mitomycin C.    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 BCG had been initiated, but stopped because of issues. The patient has had negative surveillance cystoscopies up to the latest one which disclosed a superficial appearing tumor on the left side just proximal to the bladder neck. The patient comes now for resection and mitomycin C instillation. ANESTHESIA:  General.    SURGEON:  Chepe Calloway MD    ASSISTANT:  None. ESTIMATED BLOOD LOSS:  10 mL    SPECIMENS REMOVED:  Bladder tumor    FINDINGS:  Bladder tumor and prostatic diverticular calculi. COMPLICATIONS:  None. IMPLANTS:  None. PROCEDURE:  The patient is anesthetized by the general LMA route. Timeout is accomplished and verified. The patient was placed in the dorsal lithotomy position where he is prepped and draped in sterile fashion. A resectoscope sheath is inserted through which is placed a bipolar Souza resecting element. The tumor is identified, but there is some bleeding obscuring the operative field. The scope was pulled back and it would appear that there was actually an anterior diverticulum. The prostate that had been opened during the passage of the resectoscope, there are stones in here and these are carefully teased out into the bladder and then irrigated out using a button electrode cautery of the oozing areas accomplished.   Attention was then turned to the tumor and this is not in excess of 2 cm and has papillary configuration, but a sessile base. The resection is accomplished with 3 loops and this is sent for specimen labeled superficial bladder tumor. An additional deeper bite is taken into the bladder wall to facilitate staging. Following this, I changed back from a resectoscope loop to a button electrode and thorough cautery of this area was carried out and I again checked the area anteriorly and there is no active bleeding. The remainder of the bladder was carefully inspected and there is no evidence of epithelial lesion. The scope is removed and a Hutton catheter is placed and 40 mg of mitomycin C and 80 mL of normal saline is instilled and the catheter is plugged and routine protocol will be followed in the recovery room. The procedure is terminated. The patient tolerated the procedure well.       MD LISETH Addison / IMAN  D: 09/19/2018 09:47     T: 09/19/2018 10:01  JOB #: [id number]

## 2018-09-19 NOTE — ANESTHESIA POSTPROCEDURE EVALUATION
Post-Anesthesia Evaluation and Assessment Patient: Isidro Paniagua MRN: [id number]  SSN: [id number] YOB: 1945  Age: 75 y.o. Sex: male Cardiovascular Function/Vital Signs Visit Vitals  /73 (BP 1 Location: Left arm, BP Patient Position: At rest)  Pulse (!) 57  Temp 36.6 °C (97.9 °F)  Resp 12  Ht 5' 11\" (1.803 m)  Wt 83.7 kg (184 lb 8 oz)  SpO2 98%  BMI 25.73 kg/m2 Patient is status post general anesthesia for Procedure(s): 
CYSTOSCOPY TRANSURETHRAL RESECTION OF BLADDER TUMOR. Nausea/Vomiting: None Postoperative hydration reviewed and adequate. Pain: 
Pain Scale 1: Numeric (0 - 10) (09/19/18 0956) Pain Intensity 1: 0 (09/19/18 0956) Managed Neurological Status:  
Neuro (WDL): Within Defined Limits (09/19/18 0956) At baseline Mental Status and Level of Consciousness: Arousable Pulmonary Status:  
O2 Device: CO2 nasal cannula (09/19/18 0956) Adequate oxygenation and airway patent Complications related to anesthesia: None Post-anesthesia assessment completed. No concerns Signed By: Yane Salgado MD   
 September 19, 2018

## 2018-09-19 NOTE — ANESTHESIA PREPROCEDURE EVALUATION
Anesthetic History No history of anesthetic complications Review of Systems / Medical History Patient summary reviewed, nursing notes reviewed and pertinent labs reviewed Pulmonary Within defined limits Neuro/Psych Within defined limits Cardiovascular Hypertension: well controlled GI/Hepatic/Renal 
  
GERD: well controlled Renal disease: CRI Endo/Other Cancer Other Findings Comments: Documentation of current medication Current medications obtained, documented and obtained? YES Risk Factors for Postoperative nausea/vomiting: 
     History of postoperative nausea/vomiting? NO Female? NO Motion sickness? NO Intended opioid administration for postoperative analgesia? YES Smoking Abstinence: 
Current Smoker? NO Elective Surgery? YES Seen preoperatively by anesthesiologist or proxy prior to day of surgery? YES Pt abstained from smoking 24 hours prior to anesthesia? N/A Preventive care/screening for High Blood Pressure: 
Aged 18 years and older: YES Screened for high blood pressure: YES Patients with high blood pressure referred to primary care provider 
 for BP management: YES Physical Exam 
 
Airway Mallampati: II 
TM Distance: 4 - 6 cm Neck ROM: normal range of motion Mouth opening: Normal 
 
 Cardiovascular Regular rate and rhythm,  S1 and S2 normal,  no murmur, click, rub, or gallop Rhythm: regular Rate: normal 
 
 
 
 Dental 
No notable dental hx Pulmonary Breath sounds clear to auscultation Abdominal 
GI exam deferred Other Findings Anesthetic Plan ASA: 3 Anesthesia type: general 
 
 
 
 
Induction: Intravenous Anesthetic plan and risks discussed with: Patient

## 2018-09-19 NOTE — DISCHARGE INSTRUCTIONS
Cystoscopy: What to Expect at [facility]    A cystoscopy is a procedure that lets a doctor look inside of the bladder and the urethra. The urethra is the tube that carries urine from the bladder to outside the body. The doctor uses a thin, lighted tool called a cystoscope. Your bladder is filled with fluid. This stretches the bladder so that your doctor can look closely at the inside of your bladder. After the cystoscopy, your urethra may be sore at first, and it may burn when you urinate for the first few days after the procedure. You may feel the need to urinate more often, and your urine may be pink. These symptoms should get better in 1 or 2 days. You will probably be able to go back to most of your usual activities in 1 or 2 days. This care sheet gives you a general idea about how long it will take for you to recover. But each person recovers at a different pace. Follow the steps below to get better as quickly as possible. How can you care for yourself at home? Activity    · Rest when you feel tired. Getting enough sleep will help you recover.     · Try to walk each day. Start by walking a little more than you did the day before. Bit by bit, increase the amount you walk. Walking boosts blood flow and helps prevent pneumonia and constipation.     · Avoid strenuous activities, such as bicycle riding, jogging, weight lifting, or aerobic exercise, until your doctor says it is okay.     · Ask your doctor when you can drive again.     · Most people are able to return to work within 1 or 2 days after the procedure.     · You may shower and take baths as usual.     · Ask your doctor when it is okay for you to have sex. Diet    · You can eat your normal diet. If your stomach is upset, try bland, low-fat foods like plain rice, broiled chicken, toast, and yogurt.     · Drink plenty of fluids (unless your doctor tells you not to). Medicines    · Take pain medicines exactly as directed.   ¨ If the doctor gave you a prescription medicine for pain, take it as prescribed. ¨ If you are not taking a prescription pain medicine, ask your doctor if you can take an over-the-counter medicine.     · If you think your pain medicine is making you sick to your stomach:  ¨ Take your medicine after meals (unless your doctor has told you not to). ¨ Ask your doctor for a different pain medicine.     · If your doctor prescribed antibiotics, take them as directed. Do not stop taking them just because you feel better. You need to take the full course of antibiotics. Follow-up care is a key part of your treatment and safety. Be sure to make and go to all appointments, and call your doctor if you are having problems. It's also a good idea to know your test results and keep a list of the medicines you take. When should you call for help? Call 911 anytime you think you may need emergency care. For example, call if:    · You passed out (lost consciousness).     · You have severe trouble breathing.     · You have sudden chest pain and shortness of breath, or you cough up blood.     · You have severe belly pain.    Call your doctor now or seek immediate medical care if:    · You are sick to your stomach or cannot keep fluids down.     · Your urine is still red or you see blood clots after you have urinated several times.     · You have trouble passing urine or stool, especially if you have pain or swelling in your lower belly.     · You have signs of a blood clot, such as:  ¨ Pain in your calf, back of the knee, thigh, or groin. ¨ Redness and swelling in your leg or groin.     · You develop a fever or severe chills.     · You have pain in your back just below your rib cage. This is called flank pain.    Watch closely for changes in your health, and be sure to contact your doctor if:    · You have pain or burning when you urinate.  A burning feeling is normal for a day or two after the test, but call if it does not get better.     · You have a frequent urge to urinate but can pass only small amounts of urine.     · Your urine is pink, red, or cloudy, or smells bad. It is normal for the urine to have a pinkish color for a few days after the test, but call if it does not get better. Where can you learn more? Go to http://good-jerod.info/. Enter H703 in the search box to learn more about \"Cystoscopy: What to Expect at Home. \"  Current as of: May 12, 2017  Content Version: 11.7  © 0242-2714 [employer]. Care instructions adapted under license by [employer] (which disclaims liability or warranty for this information). If you have questions about a medical condition or this instruction, always ask your healthcare professional. [address] any warranty or liability for your use of this information. Transurethral Resection for Bladder Cancer: What to Expect at [address] [place] [address] have had a transurethral resection (TUR) of the bladder. Your doctor removed cancerous tissue. You may have a small tube called a catheter in your urethra to help prevent blockage of the urethra. When the bleeding from surgery has stopped, the tube is removed. You may need to stay in the hospital 1 to 4 days. You may feel the need to urinate frequently for a while after the surgery, but this should improve with time. It may burn when you urinate. Drink lots of fluids to help with the burning. Your urine also may look pink for up to 2 to 3 weeks after surgery. This is because there may be blood in it. You may have to avoid strenuous activity and heavy lifting for about 3 weeks after TUR. This care sheet gives you a general idea about how long it will take for you to recover. But each person recovers at a different pace. Follow the steps below to feel better as quickly as possible. How can you care for yourself at home? Activity    · Rest when you feel tired.  Getting enough sleep will help you recover.     · Try to walk each day. Start by walking a little more than you did the day before. Bit by bit, increase the amount you walk. Walking boosts blood flow and helps prevent pneumonia and constipation.     · Avoid strenuous activities, such as bicycle riding, jogging, weight lifting, or aerobic exercise, for about 3 weeks, or until your doctor says it is okay.     · For about 3 weeks, avoid lifting anything that would make you strain. This may include heavy grocery bags and milk containers, a heavy briefcase or backpack, cat litter or dog food bags, a vacuum , or a child.     · Ask your doctor when you can drive again.     · You may shower and take baths when your doctor says it is okay.     · Ask your doctor when it is okay for you to have sex. Diet    · You can eat your normal diet. If your stomach is upset, try bland, low-fat foods like plain rice, broiled chicken, toast, and yogurt.     · Drink plenty of fluids (unless your doctor tells you not to). Medicines    · Your doctor will tell you if and when you can restart your medicines. He or she will also give you instructions about taking any new medicines.     · If you take blood thinners, such as warfarin (Coumadin), clopidogrel (Plavix), or aspirin, be sure to talk to your doctor. He or she will tell you if and when to start taking those medicines again. Make sure that you understand exactly what your doctor wants you to do.     · Take pain medicines exactly as directed. ¨ If the doctor gave you a prescription medicine for pain, take it as prescribed. ¨ If you are not taking a prescription pain medicine, ask your doctor if you can take an over-the-counter medicine.     · If you think your pain medicine is making you sick to your stomach:  ¨ Take your medicine after meals (unless your doctor has told you not to). ¨ Ask your doctor for a different pain medicine.     · If your doctor prescribed antibiotics, take them as directed.  Do not stop taking them just because you feel better. You need to take the full course of antibiotics. Follow-up care is a key part of your treatment and safety. Be sure to make and go to all appointments, and call your doctor if you are having problems. It's also a good idea to know your test results and keep a list of the medicines you take. When should you call for help? Call 911 anytime you think you may need emergency care. For example, call if:    · You passed out (lost consciousness).     · You have chest pain, are short of breath, or cough up blood.    Call your doctor now or seek immediate medical care if:    · You have pain that does not get better after you take pain medicine.     · You have loose stitches, or your incision comes open.     · You have signs of infection, such as:  ¨ Increased pain, swelling, warmth, or redness. ¨ Red streaks leading from the area. ¨ Pus draining from the area. ¨ A fever.     · You are unable to urinate.     · You are bleeding from your incision.     · You are sick to your stomach or cannot drink fluids.     · You have symptoms of a urinary tract infection. This may include:  ¨ Pain or burning when you urinate. ¨ A frequent need to urinate without being able to pass much urine. ¨ Pain in the flank, which is just below the rib cage and above the waist on either side of the back. ¨ Blood in your urine. ¨ A fever.     · You have signs of a blood clot in your leg (called a deep vein thrombosis), such as:  ¨ Pain in your calf, back of the knee, thigh, or groin. ¨ Redness and swelling in your leg or groin.    Watch closely for any changes in your health, and be sure to contact your doctor if you have any problems. Where can you learn more? Go to http://good-jerod.info/. Enter L628 in the search box to learn more about \"Transurethral Resection for Bladder Cancer: What to Expect at Home. \"  Current as of:  May 12, 2017  Content Version: 11.7  © 9948-6803 [employer]. Care instructions adapted under license by [employer] (which disclaims liability or warranty for this information). If you have questions about a medical condition or this instruction, always ask your healthcare professional. [address] any warranty or liability for your use of this information. DISCHARGE SUMMARY from Nurse    PATIENT INSTRUCTIONS:    After general anesthesia or intravenous sedation, for 24 hours or while taking prescription Narcotics:  · Limit your activities  · Do not drive and operate hazardous machinery  · Do not make important personal or business decisions  · Do  not drink alcoholic beverages  · If you have not urinated within 8 hours after discharge, please contact your surgeon on call. Report the following to your surgeon:  · Excessive pain, swelling, redness or odor of or around the surgical area  · Temperature over 100.5  · Nausea and vomiting lasting longer than 4 hours or if unable to take medications  · Any signs of decreased circulation or nerve impairment to extremity: change in color, persistent  numbness, tingling, coldness or increase pain  · Any questions    What to do at Home:  Recommended activity: Activity as tolerated and no driving for today      These are general instructions for a healthy lifestyle:    No smoking/ No tobacco products/ Avoid exposure to second hand smoke  Surgeon General's Warning:  Quitting smoking now greatly reduces serious risk to your health.     Obesity, smoking, and sedentary lifestyle greatly increases your risk for illness    A healthy diet, regular physical exercise & weight monitoring are important for maintaining a healthy lifestyle    You may be retaining fluid if you have a history of heart failure or if you experience any of the following symptoms:  Weight gain of 3 pounds or more overnight or 5 pounds in a week, increased swelling in our hands or feet or shortness of breath while lying flat in bed. Please call your doctor as soon as you notice any of these symptoms; do not wait until your next office visit. Recognize signs and symptoms of STROKE:    F-face looks uneven    A-arms unable to move or move unevenly    S-speech slurred or non-existent    T-time-call 911 as soon as signs and symptoms begin-DO NOT go       Back to bed or wait to see if you get better-TIME IS BRAIN. Warning Signs of HEART ATTACK     Call 911 if you have these symptoms:   Chest discomfort. Most heart attacks involve discomfort in the center of the chest that lasts more than a few minutes, or that goes away and comes back. It can feel like uncomfortable pressure, squeezing, fullness, or pain.  Discomfort in other areas of the upper body. Symptoms can include pain or discomfort in one or both arms, the back, neck, jaw, or stomach.  Shortness of breath with or without chest discomfort.  Other signs may include breaking out in a cold sweat, nausea, or lightheadedness. Don't wait more than five minutes to call 911 - MINUTES MATTER! Fast action can save your life. Calling 911 is almost always the fastest way to get lifesaving treatment. Emergency Medical Services staff can begin treatment when they arrive -- up to an hour sooner than if someone gets to the hospital by car. The discharge information has been reviewed with the patient and spouse. The patient and spouse verbalized understanding. Discharge medications reviewed with the patient and spouse and appropriate educational materials and side effects teaching were provided.   ___________________________________________________________________________________________________________________________________

## 2018-09-19 NOTE — BRIEF OP NOTE
BRIEF OPERATIVE NOTE Date of Procedure: 9/19/2018 Preoperative Diagnosis: Malignant neoplasm of urinary bladder, unspecified site ([facility] [C67.9] Postoperative Diagnosis: Malignant neoplasm of urinary bladder, unspecified site ([facility] [C67.9] Procedure(s): 
CYSTOSCOPY TRANSURETHRAL RESECTION OF BLADDER TUMOR Surgeon(s) and Role: Alan Goldmann, MD - Primary Surgical Assistant: none Surgical Staff: 
Circ-1: Romeo Hatchet, RN Scrub Tech-1: [facility] Time In Incision Start 0239 Incision Close Anesthesia: General  
Estimated Blood Loss: 10cc Specimens:  
ID Type Source Tests Collected by Time Destination 1 : bladder tumor superficial Preservative Bladder  Kami Hayes MD 9/19/2018 0258 Pathology 2 : bladder tumor deep Preservative Bladder  Kami Hayes MD 9/19/2018 1025 Pathology Findings: superficial bladder tumor left lateral bladder neck; prostatic diverticulum w stones Complications: none Implants: * No implants in log *

## 2018-09-19 NOTE — IP AVS SNAPSHOT
Indu Bhatt 
 
 
 [address] [facility] Patient: Aimee Henderson MRN: [id number] About your hospitalization You were admitted on:  September 19, 2018 You last received care in the:  [facility] PHASE 2 RECOVERY You were discharged on:  September 19, 2018 Why you were hospitalized Your primary diagnosis was:  Not on File Follow-up Information Follow up With Details Comments Contact Info [address] [place]   [address] [address] [address] 
[phone] Lynford Peabody, MD  Follow up on Tuesday 9/25/18 [address] [facility] [address] [address] 
[phone] Your Scheduled Appointments Thursday October 04, 2018  9:15 AM EDT Office Visit with Lynford Peabody, MD  
[facility] [address] [place] [address]) [address] [place] A [address] [address]  
[phone] Discharge Orders None A check carlito indicates which time of day the medication should be taken. My Medications START taking these medications Instructions Each Dose to Equal  
 Morning Noon Evening Bedtime  
 ciprofloxacin HCl 500 mg tablet Commonly known as:  CIPRO Your last dose was: Your next dose is: Take 1 Tab by mouth two (2) times a day for 10 days. 500 mg CHANGE how you take these medications Instructions Each Dose to Equal  
 Morning Noon Evening Bedtime * HYDROcodone-acetaminophen  mg tablet Commonly known as:  Venita Reyes What changed:  Another medication with the same name was added. Make sure you understand how and when to take each. Your last dose was: Your next dose is: Take 1 Tab by mouth every six (6) hours as needed for Pain. Max Daily Amount: 4 Tabs. 1 Tab * HYDROcodone-acetaminophen 5-325 mg per tablet Commonly known as:  Michaela Kind What changed: You were already taking a medication with the same name, and this prescription was added. Make sure you understand how and when to take each. Your last dose was: Your next dose is: Take 1 Tab by mouth every four (4) hours as needed for Pain. Max Daily Amount: 6 Tabs. 1 Tab * Notice: This list has 2 medication(s) that are the same as other medications prescribed for you. Read the directions carefully, and ask your doctor or other care provider to review them with you. CONTINUE taking these medications Instructions Each Dose to Equal  
 Morning Noon Evening Bedtime  
 finasteride 5 mg tablet Commonly known as:  PROSCAR Your last dose was: Your next dose is: Take 1 Tab by mouth daily. 5 mg  
    
   
   
   
  
 latanoprost 0.005 % ophthalmic solution Commonly known as:  [place] Your last dose was: Your next dose is:    
   
   
 Administer 1 Drop to left eye nightly. 1 Drop  
    
   
   
   
  
 lisinopril 5 mg tablet Commonly known as:  [place] Your last dose was: Your next dose is: Take  by mouth daily. nitrofurantoin (macrocrystal-monohydrate) 100 mg capsule Commonly known as:  MACROBID Your last dose was: Your next dose is: Take one capsule by mouth twice daily for 10 days. START TAKING MEDICATION 7 DAYS PRIOR TO PROCEDURE. PriLOSEC 10 mg capsule Generic drug:  omeprazole Your last dose was: Your next dose is: Take 10 mg by mouth daily. 10 mg  
    
   
   
   
  
 tamsulosin 0.4 mg capsule Commonly known as:  FLOMAX Your last dose was: Your next dose is: One cap q d pc ZOCOR 10 mg tablet Generic drug:  simvastatin Your last dose was: Your next dose is: Take  by mouth nightly. Where to Get Your Medications These medications were sent to Luciana Mchugh [address] [facility], [address]  [address] [facility] Phone:  [phone]  
  ciprofloxacin HCl 500 mg tablet Information on where to get these meds will be given to you by the nurse or doctor. ! Ask your nurse or doctor about these medications HYDROcodone-acetaminophen 5-325 mg per tablet Opioid Education Prescription Opioids: What You Need to Know: 
 
Prescription opioids can be used to help relieve moderate-to-severe pain and are often prescribed following a surgery or injury, or for certain health conditions. These medications can be an important part of treatment but also come with serious risks. Opioids are strong pain medicines. Examples include hydrocodone, oxycodone, fentanyl, and morphine. Heroin is an example of an illegal opioid. It is important to work with your health care provider to make sure you are getting the safest, most effective care. WHAT ARE THE RISKS AND SIDE EFFECTS OF OPIOID USE? Prescription opioids carry serious risks of addiction and overdose, especially with prolonged use. An opioid overdose, often marked by slow breathing, can cause sudden death. The use of prescription opioids can have a number of side effects as well, even when taken as directed. · Tolerance-meaning you might need to take more of a medication for the same pain relief · Physical dependence-meaning you have symptoms of withdrawal when the medication is stopped. Withdrawal symptoms can include nausea, sweating, chills, diarrhea, stomach cramps, and muscle aches. Withdrawal can last up to several weeks, depending on which drug you took and how long you took it. · Increased sensitivity to pain · Constipation · Nausea, vomiting, and dry mouth · Sleepiness and dizziness · Confusion · Depression · Low levels of testosterone that can result in lower sex drive, energy, and strength · Itching and sweating RISKS ARE GREATER WITH:      
· History of drug misuse, substance use disorder, or overdose · Mental health conditions (such as depression or anxiety) · Sleep apnea · Older age (72 years or older) · Pregnancy Avoid alcohol while taking prescription opioids. Also, unless specifically advised by your health care provider, medications to avoid include: · Benzodiazepines (such as Xanax or Valium) · Muscle relaxants (such as Soma or Flexeril) · Hypnotics (such as Ambien or Lunesta) · Other prescription opioids KNOW YOUR OPTIONS Talk to your health care provider about ways to manage your pain that don't involve prescription opioids. Some of these options may actually work better and have fewer risks and side effects. Options may include: 
· Pain relievers such as acetaminophen, ibuprofen, and naproxen · Some medications that are also used for depression or seizures · Physical therapy and exercise · Counseling to help patients learn how to cope better with triggers of pain and stress. · Application of heat or cold compress · Massage therapy · Relaxation techniques Be Informed Make sure you know the name of your medication, how much and how often to take it, and its potential risks & side effects. IF YOU ARE PRESCRIBED OPIOIDS FOR PAIN: 
· Never take opioids in greater amounts or more often than prescribed. Remember the goal is not to be pain-free but to manage your pain at a tolerable level. · Follow up with your primary care provider to: · Work together to create a plan on how to manage your pain. · Talk about ways to help manage your pain that don't involve prescription opioids. · Talk about any and all concerns and side effects. · Help prevent misuse and abuse. · Never sell or share prescription opioids · Help prevent misuse and abuse. · Store prescription opioids in a secure place and out of reach of others (this may include visitors, children, friends, and family). · Safely dispose of unused/unwanted prescription opioids: Find your community drug take-back program or your pharmacy mail-back program, or flush them down the toilet, following guidance from the Food and Drug Administration (www.fda.gov/Drugs/ResourcesForYou). · Visit www.cdc.gov/drugoverdose to learn about the risks of opioid abuse and overdose. · If you believe you may be struggling with addiction, tell your health care provider and ask for guidance or call [employer] at 6-133-205-PSXC. Discharge Instructions Cystoscopy: What to Expect at [facility] Your Recovery A cystoscopy is a procedure that lets a doctor look inside of the bladder and the urethra. The urethra is the tube that carries urine from the bladder to outside the body. The doctor uses a thin, lighted tool called a cystoscope. Your bladder is filled with fluid. This stretches the bladder so that your doctor can look closely at the inside of your bladder. After the cystoscopy, your urethra may be sore at first, and it may burn when you urinate for the first few days after the procedure. You may feel the need to urinate more often, and your urine may be pink. These symptoms should get better in 1 or 2 days. You will probably be able to go back to most of your usual activities in 1 or 2 days. This care sheet gives you a general idea about how long it will take for you to recover. But each person recovers at a different pace. Follow the steps below to get better as quickly as possible. How can you care for yourself at home? Activity 
  · Rest when you feel tired. Getting enough sleep will help you recover.   · Try to walk each day. Start by walking a little more than you did the day before. Bit by bit, increase the amount you walk. Walking boosts blood flow and helps prevent pneumonia and constipation.  
  · Avoid strenuous activities, such as bicycle riding, jogging, weight lifting, or aerobic exercise, until your doctor says it is okay.  
  · Ask your doctor when you can drive again.  
  · Most people are able to return to work within 1 or 2 days after the procedure.  
  · You may shower and take baths as usual.  
  · Ask your doctor when it is okay for you to have sex. Diet 
  · You can eat your normal diet. If your stomach is upset, try bland, low-fat foods like plain rice, broiled chicken, toast, and yogurt.  
  · Drink plenty of fluids (unless your doctor tells you not to). Medicines 
  · Take pain medicines exactly as directed. ¨ If the doctor gave you a prescription medicine for pain, take it as prescribed. ¨ If you are not taking a prescription pain medicine, ask your doctor if you can take an over-the-counter medicine.  
  · If you think your pain medicine is making you sick to your stomach: 
¨ Take your medicine after meals (unless your doctor has told you not to). ¨ Ask your doctor for a different pain medicine.  
  · If your doctor prescribed antibiotics, take them as directed. Do not stop taking them just because you feel better. You need to take the full course of antibiotics. Follow-up care is a key part of your treatment and safety. Be sure to make and go to all appointments, and call your doctor if you are having problems. It's also a good idea to know your test results and keep a list of the medicines you take. When should you call for help? Call 911 anytime you think you may need emergency care. For example, call if: 
  · You passed out (lost consciousness).  
  · You have severe trouble breathing.  
  · You have sudden chest pain and shortness of breath, or you cough up blood.   · You have severe belly pain.  
 Call your doctor now or seek immediate medical care if: 
  · You are sick to your stomach or cannot keep fluids down.  
  · Your urine is still red or you see blood clots after you have urinated several times.  
  · You have trouble passing urine or stool, especially if you have pain or swelling in your lower belly.  
  · You have signs of a blood clot, such as: 
¨ Pain in your calf, back of the knee, thigh, or groin. ¨ Redness and swelling in your leg or groin.  
  · You develop a fever or severe chills.  
  · You have pain in your back just below your rib cage. This is called flank pain.  
 Watch closely for changes in your health, and be sure to contact your doctor if: 
  · You have pain or burning when you urinate. A burning feeling is normal for a day or two after the test, but call if it does not get better.  
  · You have a frequent urge to urinate but can pass only small amounts of urine.  
  · Your urine is pink, red, or cloudy, or smells bad. It is normal for the urine to have a pinkish color for a few days after the test, but call if it does not get better. Where can you learn more? Go to http://good-jerod.info/. Enter E585 in the search box to learn more about \"Cystoscopy: What to Expect at Home. \" Current as of: May 12, 2017 Content Version: 11.7 © 1662-2365 [employer]. Care instructions adapted under license by [employer] (which disclaims liability or warranty for this information). If you have questions about a medical condition or this instruction, always ask your healthcare professional. [place] [address] any warranty or liability for your use of this information. Transurethral Resection for Bladder Cancer: What to Expect at Home Your Recovery You have had a transurethral resection (TUR) of the bladder. Your doctor removed cancerous tissue. You may have a small tube called a catheter in your urethra to help prevent blockage of the urethra. When the bleeding from surgery has stopped, the tube is removed. You may need to stay in the hospital 1 to 4 days. You may feel the need to urinate frequently for a while after the surgery, but this should improve with time. It may burn when you urinate. Drink lots of fluids to help with the burning. Your urine also may look pink for up to 2 to 3 weeks after surgery. This is because there may be blood in it. You may have to avoid strenuous activity and heavy lifting for about 3 weeks after TUR. This care sheet gives you a general idea about how long it will take for you to recover. But each person recovers at a different pace. Follow the steps below to feel better as quickly as possible. How can you care for yourself at home? Activity 
  · Rest when you feel tired. Getting enough sleep will help you recover.  
  · Try to walk each day. Start by walking a little more than you did the day before. Bit by bit, increase the amount you walk. Walking boosts blood flow and helps prevent pneumonia and constipation.  
  · Avoid strenuous activities, such as bicycle riding, jogging, weight lifting, or aerobic exercise, for about 3 weeks, or until your doctor says it is okay.  
  · For about 3 weeks, avoid lifting anything that would make you strain. This may include heavy grocery bags and milk containers, a heavy briefcase or backpack, cat litter or dog food bags, a vacuum , or a child.  
  · Ask your doctor when you can drive again.  
  · You may shower and take baths when your doctor says it is okay.  
  · Ask your doctor when it is okay for you to have sex. Diet 
  · You can eat your normal diet. If your stomach is upset, try bland, low-fat foods like plain rice, broiled chicken, toast, and yogurt.  
  · Drink plenty of fluids (unless your doctor tells you not to). Medicines   · Your doctor will tell you if and when you can restart your medicines. He or she will also give you instructions about taking any new medicines.  
  · If you take blood thinners, such as warfarin (Coumadin), clopidogrel (Plavix), or aspirin, be sure to talk to your doctor. He or she will tell you if and when to start taking those medicines again. Make sure that you understand exactly what your doctor wants you to do.  
  · Take pain medicines exactly as directed. ¨ If the doctor gave you a prescription medicine for pain, take it as prescribed. ¨ If you are not taking a prescription pain medicine, ask your doctor if you can take an over-the-counter medicine.  
  · If you think your pain medicine is making you sick to your stomach: 
¨ Take your medicine after meals (unless your doctor has told you not to). ¨ Ask your doctor for a different pain medicine.  
  · If your doctor prescribed antibiotics, take them as directed. Do not stop taking them just because you feel better. You need to take the full course of antibiotics. Follow-up care is a key part of your treatment and safety. Be sure to make and go to all appointments, and call your doctor if you are having problems. It's also a good idea to know your test results and keep a list of the medicines you take. When should you call for help? Call 911 anytime you think you may need emergency care. For example, call if: 
  · You passed out (lost consciousness).  
  · You have chest pain, are short of breath, or cough up blood.  
 Call your doctor now or seek immediate medical care if: 
  · You have pain that does not get better after you take pain medicine.  
  · You have loose stitches, or your incision comes open.  
  · You have signs of infection, such as: 
¨ Increased pain, swelling, warmth, or redness. ¨ Red streaks leading from the area. ¨ Pus draining from the area. ¨ A fever.  
  · You are unable to urinate.   · You are bleeding from your incision.  
  · You are sick to your stomach or cannot drink fluids.  
  · You have symptoms of a urinary tract infection. This may include: 
¨ Pain or burning when you urinate. ¨ A frequent need to urinate without being able to pass much urine. ¨ Pain in the flank, which is just below the rib cage and above the waist on either side of the back. ¨ Blood in your urine. ¨ A fever.  
  · You have signs of a blood clot in your leg (called a deep vein thrombosis), such as: 
¨ Pain in your calf, back of the knee, thigh, or groin. ¨ Redness and swelling in your leg or groin.  
 Watch closely for any changes in your health, and be sure to contact your doctor if you have any problems. Where can you learn more? Go to http://good-jerod.info/. Enter C016 in the search box to learn more about \"Transurethral Resection for Bladder Cancer: What to Expect at Home. \" Current as of: May 12, 2017 Content Version: 11.7 © 7150-3208 [employer]. Care instructions adapted under license by [employer] (which disclaims liability or warranty for this information). If you have questions about a medical condition or this instruction, always ask your healthcare professional. [place] [address] any warranty or liability for your use of this information. DISCHARGE SUMMARY from Nurse PATIENT INSTRUCTIONS: 
 
After general anesthesia or intravenous sedation, for 24 hours or while taking prescription Narcotics: · Limit your activities · Do not drive and operate hazardous machinery · Do not make important personal or business decisions · Do  not drink alcoholic beverages · If you have not urinated within 8 hours after discharge, please contact your surgeon on call. Report the following to your surgeon: 
· Excessive pain, swelling, redness or odor of or around the surgical area · Temperature over 100.5 · Nausea and vomiting lasting longer than 4 hours or if unable to take medications · Any signs of decreased circulation or nerve impairment to extremity: change in color, persistent  numbness, tingling, coldness or increase pain · Any questions What to do at Home: 
Recommended activity: Activity as tolerated and no driving for today These are general instructions for a healthy lifestyle: No smoking/ No tobacco products/ Avoid exposure to second hand smoke Surgeon General's Warning:  Quitting smoking now greatly reduces serious risk to your health. Obesity, smoking, and sedentary lifestyle greatly increases your risk for illness A healthy diet, regular physical exercise & weight monitoring are important for maintaining a healthy lifestyle You may be retaining fluid if you have a history of heart failure or if you experience any of the following symptoms:  Weight gain of 3 pounds or more overnight or 5 pounds in a week, increased swelling in our hands or feet or shortness of breath while lying flat in bed. Please call your doctor as soon as you notice any of these symptoms; do not wait until your next office visit. Recognize signs and symptoms of STROKE: 
 
F-face looks uneven A-arms unable to move or move unevenly S-speech slurred or non-existent T-time-call 911 as soon as signs and symptoms begin-DO NOT go Back to bed or wait to see if you get better-TIME IS BRAIN. Warning Signs of HEART ATTACK Call 911 if you have these symptoms: 
? Chest discomfort. Most heart attacks involve discomfort in the center of the chest that lasts more than a few minutes, or that goes away and comes back. It can feel like uncomfortable pressure, squeezing, fullness, or pain. ? Discomfort in other areas of the upper body. Symptoms can include pain or discomfort in one or both arms, the back, neck, jaw, or stomach. ? Shortness of breath with or without chest discomfort. ? Other signs may include breaking out in a cold sweat, nausea, or lightheadedness. Don't wait more than five minutes to call [address]! Fast action can save your life. Calling 911 is almost always the fastest way to get lifesaving treatment. Emergency Medical Services staff can begin treatment when they arrive  up to an hour sooner than if someone gets to the hospital by car. The discharge information has been reviewed with the patient and spouse. The patient and spouse verbalized understanding. Discharge medications reviewed with the patient and spouse and appropriate educational materials and side effects teaching were provided. ___________________________________________________________________________________________________________________________________ Introducing [facility] [place] [facility]! [facility] introduces Knack.it patient portal. Now you can access parts of your medical record, email your doctor's office, and request medication refills online. 1. In your internet browser, go to [email or web address] 2. Click on the First Time User? Click Here link in the Sign In box. You will see the New Member Sign Up page. 3. Enter your Knack.it Access Code exactly as it appears below. You will not need to use this code after youve completed the sign-up process. If you do not sign up before the expiration date, you must request a new code. · Knack.it Access Code: [email or web address] Expires: 12/16/2018 11:48 AM 
 
4. Enter the last four digits of your Social Security Number (xxxx) and Date of Birth (mm/dd/yyyy) as indicated and click Submit. You will be taken to the next sign-up page. 5. Create a Knack.it ID. This will be your MyChart login ID and cannot be changed, so think of one that is secure and easy to remember. 6. Create a Knack.it password. You can change your password at any time. 7. Enter your Password Reset Question and Answer.  This can be used at a later time if you forget your password. 8. Enter your e-mail address. You will receive e-mail notification when new information is available in [address] 9. Click Sign Up. You can now view and download portions of your medical record. 10. Click the Download Summary menu link to download a portable copy of your medical information. If you have questions, please visit the Frequently Asked Questions section of the [employer] website. Remember, [employer] is NOT to be used for urgent needs. For medical emergencies, dial 911. Now available from your iPhone and Android! Introducing Dom Perez As a [employer] patient, I wanted to make you aware of our electronic visit tool called Dom Perez. [employer] 24/7 allows you to connect within minutes with a medical provider 24 hours a day, seven days a week via a mobile device or tablet or logging into a secure website from your computer. You can access Dom Perez from anywhere in the [place]. A virtual visit might be right for you when you have a simple condition and feel like you just dont want to get out of bed, or cant get away from work for an appointment, when your regular [employer] provider is not available (evenings, weekends or holidays), or when youre out of town and need minor care. Electronic visits cost only $49 and if the [employer] 24/7 provider determines a prescription is needed to treat your condition, one can be electronically transmitted to a nearby pharmacy*. Please take a moment to enroll today if you have not already done so. The enrollment process is free and takes just a few minutes. To enroll, please download the [employer] 24/7 ashleigh to your tablet or phone, or visit [email or web address] to enroll on your computer.    
And, as an [address] [place] [address] patient with a [employer] account, the results of your visits will be scanned into your electronic medical record and your primary care provider will be able to view the scanned results. We urge you to continue to see your regular [facility] provider for your ongoing medical care. And while your primary care provider may not be the one available when you seek a [employer] virtual visit, the peace of mind you get from getting a real diagnosis real time can be priceless. For more information on [employer], view our Frequently Asked Questions (FAQs) at [email or web address]. Sincerely, 
 
Lukas Armas MD 
Chief Medical Officer [address] *:  certain medications cannot be prescribed via [employer] Providers Seen During Your Hospitalization Provider Specialty Primary office phone Chitra Carlos MD Urology [phone] Your Primary Care Physician (PCP) Primary Care Physician Office Phone Office Fax Noa Altman, [address] [place] [address] [phone] You are allergic to the following No active allergies Recent Documentation Height Weight BMI Smoking Status 1.803 m 83.7 kg 25.73 kg/m2 Never Smoker Emergency Contacts Name Discharge Info Relation Home Work Mobile Darlene Laureano DISCHARGE CAREGIVER [3] Spouse [3] [phone] Patient Belongings The following personal items are in your possession at time of discharge: 
  Dental Appliances: None  Visual Aid: Contacts, Glasses      Home Medications: None   Jewelry: None  Clothing: Footwear, Socks, Pants, Undergarments, Shirt    Other Valuables: Kisha Matos Please provide this summary of care documentation to your next provider. Signatures-by signing, you are acknowledging that this After Visit Summary has been reviewed with you and you have received a copy. Patient Signature:  ____________________________________________________________ Date:  ____________________________________________________________  
  
Yasmeen Nicole Provider Signature:  ____________________________________________________________ Date:  ____________________________________________________________

## 2018-09-20 ENCOUNTER — TELEPHONE (OUTPATIENT)
Dept: UROLOGY | Age: 73
End: 2018-09-20

## 2018-09-20 NOTE — TELEPHONE ENCOUNTER
I called and spoke with the patient's wife that this is a cancer but it is of low-grade low stage and keeps the patient in category 1 which gives us the option of avoiding BCG and that is the way that I would proceed.   The patient is due to come back on Tuesday the 25th where his catheter to be removed and we will schedule him for a surveillance cystoscopy in 3 months

## 2018-09-25 ENCOUNTER — OFFICE VISIT (OUTPATIENT)
Dept: UROLOGY | Age: 73
End: 2018-09-25

## 2018-09-25 VITALS
HEIGHT: 71 IN | BODY MASS INDEX: 26.04 KG/M2 | HEART RATE: 62 BPM | WEIGHT: 186 LBS | DIASTOLIC BLOOD PRESSURE: 77 MMHG | OXYGEN SATURATION: 95 % | SYSTOLIC BLOOD PRESSURE: 146 MMHG

## 2018-09-25 DIAGNOSIS — C67.9 MALIGNANT NEOPLASM OF URINARY BLADDER, UNSPECIFIED SITE (HCC): Primary | ICD-10-CM

## 2018-09-25 RX ORDER — KETOROLAC TROMETHAMINE 5 MG/ML
SOLUTION OPHTHALMIC
COMMUNITY
Start: 2018-06-26 | End: 2021-07-12 | Stop reason: ALTCHOICE

## 2018-09-25 NOTE — PROGRESS NOTES
Mr. Kilpatrick Score has a reminder for a \"due or due soon\" health maintenance. I have asked that he contact his primary care provider for follow-up on this health maintenance.

## 2018-09-25 NOTE — PROGRESS NOTES
MrNoam Jameson has a reminder for a \"due or due soon\" health maintenance. I have asked that he contact his primary care provider for follow-up on this health maintenance.

## 2018-09-25 NOTE — PATIENT INSTRUCTIONS
Learning About Transurethral Resection for Bladder Cancer  What is transurethral resection (TUR) for bladder cancer? Transurethral resection (TUR) of the bladder is a surgery that removes cancerous tissue. It does not remove the bladder. TUR is the most common and effective treatment for early-stage bladder cancer. It may also work well for more advanced cancer if all the cancer can be removed and biopsies show that no cancer cells remain. How is TUR done? Your doctor will give you medicine to make you sleep or feel relaxed. You will not feel pain. The doctor will put a thin, lighted tool called a cystoscope, or scope, into your urethra. The urethra is the tube that carries urine from the bladder to the outside of the body. Then the doctor will gently thread the scope into your bladder. Your bladder will then be filled with fluid. This stretches the bladder so that your doctor can clearly see the inside of your bladder. Your doctor will use small surgical tools through the scope to remove and/or burn away any cancer cells. What can you expect after TUR? Your doctor may leave a small tube called a catheter in the urethra to help prevent blockage of the urethra. When the bleeding from surgery has stopped, the tube is removed. You may need to stay in the hospital for 1 to 4 days. You may still have the catheter when you go home. You may feel the need to urinate often for a while after the surgery. But this should improve with time. It may burn when you urinate. Drink lots of fluids to help with the burning. Your urine also may look pink for up to 2 to 3 weeks after surgery. This is because there may be blood in it. You may have to avoid strenuous activity and heavy lifting for about 3 weeks after TUR. Your doctor may suggest that you have chemotherapy or biological therapy after the procedure. Bladder cancer can come back. You will need regular exams for the rest of your life to check for the cancer. You may need the surgery again. Follow-up care is a key part of your treatment and safety. Be sure to make and go to all appointments, and call your doctor if you are having problems. It's also a good idea to know your test results and keep a list of the medicines you take. Where can you learn more? Go to http://good-jerod.info/. Enter T508 in the search box to learn more about \"Learning About Transurethral Resection for Bladder Cancer. \"  Current as of: May 12, 2017  Content Version: 11.7  © 1185-0535 [employer]. Care instructions adapted under license by netprice.com (which disclaims liability or warranty for this information). If you have questions about a medical condition or this instruction, always ask your healthcare professional. [place] [address] any warranty or liability for your use of this information. Urinary Retention: Care Instructions  Your Care Instructions    Urinary retention means that you aren't able to urinate. In men, it is often caused by a blockage of the urinary tract from an enlarged prostate gland. In men and women, it can also be caused by an infection or nerve damage. Or it may be a side effect of a medicine. The doctor may have drained the urine from your bladder. If you still have problems passing urine, you may need to use a catheter at home. This is used to empty your bladder until the problem can be fixed. Your doctor may put a catheter in your bladder before you go home. If so, he or she will tell you when to come back to have the catheter removed. The doctor has checked you closely. But problems can develop later. If you notice any problems or new symptoms, get medical treatment right away. Follow-up care is a key part of your treatment and safety. Be sure to make and go to all appointments, and call your doctor if you are having problems.  It's also a good idea to know your test results and keep a list of the medicines you take. How can you care for yourself at home? · Take your medicines exactly as prescribed. Call your doctor if you think you are having a problem with your medicine. You will get more details on the specific medicines your doctor prescribes. · Check with your doctor before you use any over-the-counter medicines. Many cold and allergy medicines, for example, can make this problem worse. Make sure your doctor knows all of the medicines, vitamins, supplements, and herbal remedies you take. · Spread out through the day the amount of fluid you drink. Do not drink a lot at bedtime. · Avoid alcohol and caffeine. · If you have been given a catheter, or if one is already in place, follow the instructions you were given. Always wash your hands before and after you handle the catheter. When should you call for help? Call your doctor now or seek immediate medical care if:    · You cannot urinate at all, or it is getting harder to urinate.     · You have symptoms of a urinary tract infection. These may include:  ¨ Pain or burning when you urinate. ¨ A frequent need to urinate without being able to pass much urine. ¨ Pain in the flank, which is just below the rib cage and above the waist on either side of the back. ¨ Blood in your urine. ¨ A fever.    Watch closely for changes in your health, and be sure to contact your doctor if:    · You have any problems with your catheter.     · You do not get better as expected. Where can you learn more? Go to http://good-jerod.info/. Enter M244 in the search box to learn more about \"Urinary Retention: Care Instructions. \"  Current as of: May 12, 2017  Content Version: 11.7  © 4659-0325 [employer]. Care instructions adapted under license by [employer] (which disclaims liability or warranty for this information).  If you have questions about a medical condition or this instruction, always ask your healthcare professional. [address] any warranty or liability for your use of this information.

## 2018-09-25 NOTE — MR AVS SNAPSHOT
[facility] [address] [address] 
[phone] Patient: Cameron Valentine MRN: [id number] QGL:[id number] Visit Information Date & Time Provider Department Dept. Phone Encounter #  
 9/25/2018  8:45 AM Álvaro Walden [facility] [phone] Follow-up Instructions Return in about 3 months (around 12/25/2018) for cystoscopy; free and total psa before visit. Follow-up and Disposition History Your Appointments 1/3/2019  9:00 AM  
PROCEDURE with Albertina Salgado MD  
[facility] [address]) Appt Note: cysto/PSA before appt per Dr. Ly Abdul [address] [facility] [address] [address]  
[phone] [address] [address] Upcoming Health Maintenance Date Due DTaP/Tdap/Td series (1 - Tdap) 4/23/1965 Shingrix Vaccine Age 50> (1 of 2) 4/23/1994 FOBT Q 1 YEAR AGE 50-75 4/23/1994 GLAUCOMA SCREENING Q2Y 4/23/2009 Pneumococcal 65+ High/Highest Risk (1 of 2 - PCV13) 4/23/2009 MEDICARE YEARLY EXAM 3/14/2018 Influenza Age 5 to Adult 8/1/2018 Allergies as of 9/25/2018  Review Complete On: 9/25/2018 By: Albertina Salgado MD  
 No Known Allergies Current Immunizations  Never Reviewed No immunizations on file. Not reviewed this visit You Were Diagnosed With   
  
 Codes Comments Malignant neoplasm of urinary bladder, unspecified site [facility])    -  Primary ICD-10-CM: C67.9 ICD-9-CM: 188. 9 Vitals BP Pulse Height(growth percentile) Weight(growth percentile) SpO2 BMI  
 146/77 (BP 1 Location: Left arm, BP Patient Position: Sitting) 62 5' 11\" (1.803 m) 186 lb (84.4 kg) 95% 25.94 kg/m2 Smoking Status Never Smoker Vitals History BMI and BSA Data Body Mass Index Body Surface Area  
 25.94 kg/m 2 2.06 m 2 Preferred Pharmacy Pharmacy Name Phone [facility] [address], [address] [phone] Your Updated Medication List  
  
   
This list is accurate as of 9/25/18  9:38 AM.  Always use your most recent med list.  
  
  
  
  
 ciprofloxacin HCl 500 mg tablet Commonly known as:  CIPRO Take 1 Tab by mouth two (2) times a day for 10 days. finasteride 5 mg tablet Commonly known as:  PROSCAR Take 1 Tab by mouth daily. * HYDROcodone-acetaminophen  mg tablet Commonly known as:  [address] Take 1 Tab by mouth every six (6) hours as needed for Pain. Max Daily Amount: 4 Tabs. * HYDROcodone-acetaminophen 5-325 mg per tablet Commonly known as:  [address] Take 1 Tab by mouth every four (4) hours as needed for Pain. Max Daily Amount: 6 Tabs.  
  
 ketorolac 0.5 % ophthalmic solution Commonly known as:  ACULAR  
  
 latanoprost 0.005 % ophthalmic solution Commonly known as:  Edilberto Rodriguez Administer 1 Drop to left eye nightly. lisinopril 5 mg tablet Commonly known as:  Josey Primmer Take  by mouth daily. nitrofurantoin (macrocrystal-monohydrate) 100 mg capsule Commonly known as:  MACROBID Take one capsule by mouth twice daily for 10 days. START TAKING MEDICATION 7 DAYS PRIOR TO PROCEDURE. PriLOSEC 10 mg capsule Generic drug:  omeprazole Take 10 mg by mouth daily. tamsulosin 0.4 mg capsule Commonly known as:  FLOMAX One cap q d pc ZOCOR 10 mg tablet Generic drug:  simvastatin Take  by mouth nightly. * Notice: This list has 2 medication(s) that are the same as other medications prescribed for you. Read the directions carefully, and ask your doctor or other care provider to review them with you. Follow-up Instructions Return in about 3 months (around 12/25/2018) for cystoscopy; free and total psa before visit. Patient Instructions Learning About Transurethral Resection for Bladder Cancer What is transurethral resection (TUR) for bladder cancer? Transurethral resection (TUR) of the bladder is a surgery that removes cancerous tissue. It does not remove the bladder. TUR is the most common and effective treatment for early-stage bladder cancer. It may also work well for more advanced cancer if all the cancer can be removed and biopsies show that no cancer cells remain. How is TUR done? Your doctor will give you medicine to make you sleep or feel relaxed. You will not feel pain. The doctor will put a thin, lighted tool called a cystoscope, or scope, into your urethra. The urethra is the tube that carries urine from the bladder to the outside of the body. Then the doctor will gently thread the scope into your bladder. Your bladder will then be filled with fluid. This stretches the bladder so that your doctor can clearly see the inside of your bladder. Your doctor will use small surgical tools through the scope to remove and/or burn away any cancer cells. What can you expect after TUR? Your doctor may leave a small tube called a catheter in the urethra to help prevent blockage of the urethra. When the bleeding from surgery has stopped, the tube is removed. You may need to stay in the hospital for 1 to 4 days. You may still have the catheter when you go home. You may feel the need to urinate often for a while after the surgery. But this should improve with time. It may burn when you urinate. Drink lots of fluids to help with the burning. Your urine also may look pink for up to 2 to 3 weeks after surgery. This is because there may be blood in it. You may have to avoid strenuous activity and heavy lifting for about 3 weeks after TUR. Your doctor may suggest that you have chemotherapy or biological therapy after the procedure. Bladder cancer can come back. You will need regular exams for the rest of your life to check for the cancer. You may need the surgery again. Follow-up care is a key part of your treatment and safety. Be sure to make and go to all appointments, and call your doctor if you are having problems. It's also a good idea to know your test results and keep a list of the medicines you take. Where can you learn more? Go to http://good-jerod.info/. Enter T508 in the search box to learn more about \"Learning About Transurethral Resection for Bladder Cancer. \" Current as of: May 12, 2017 Content Version: 11.7 © 2529-7092 [employer]. Care instructions adapted under license by [employer] (which disclaims liability or warranty for this information). If you have questions about a medical condition or this instruction, always ask your healthcare professional. [address] any warranty or liability for your use of this information. Urinary Retention: Care Instructions Your Care Instructions Urinary retention means that you aren't able to urinate. In men, it is often caused by a blockage of the urinary tract from an enlarged prostate gland. In men and women, it can also be caused by an infection or nerve damage. Or it may be a side effect of a medicine. The doctor may have drained the urine from your bladder. If you still have problems passing urine, you may need to use a catheter at home. This is used to empty your bladder until the problem can be fixed. Your doctor may put a catheter in your bladder before you go home. If so, he or she will tell you when to come back to have the catheter removed. The doctor has checked you closely. But problems can develop later. If you notice any problems or new symptoms, get medical treatment right away. Follow-up care is a key part of your treatment and safety. Be sure to make and go to all appointments, and call your doctor if you are having problems. It's also a good idea to know your test results and keep a list of the medicines you take. How can you care for yourself at home? · Take your medicines exactly as prescribed. Call your doctor if you think you are having a problem with your medicine. You will get more details on the specific medicines your doctor prescribes. · Check with your doctor before you use any over-the-counter medicines. Many cold and allergy medicines, for example, can make this problem worse. Make sure your doctor knows all of the medicines, vitamins, supplements, and herbal remedies you take. · Spread out through the day the amount of fluid you drink. Do not drink a lot at bedtime. · Avoid alcohol and caffeine. · If you have been given a catheter, or if one is already in place, follow the instructions you were given. Always wash your hands before and after you handle the catheter. When should you call for help? Call your doctor now or seek immediate medical care if: 
  · You cannot urinate at all, or it is getting harder to urinate.  
  · You have symptoms of a urinary tract infection. These may include: 
¨ Pain or burning when you urinate. ¨ A frequent need to urinate without being able to pass much urine. ¨ Pain in the flank, which is just below the rib cage and above the waist on either side of the back. ¨ Blood in your urine. ¨ A fever.  
 Watch closely for changes in your health, and be sure to contact your doctor if: 
  · You have any problems with your catheter.  
  · You do not get better as expected. Where can you learn more? Go to http://good-jerod.info/. Enter M244 in the search box to learn more about \"Urinary Retention: Care Instructions. \" Current as of: May 12, 2017 Content Version: 11.7 © 7501-3385 Zeppelin. Care instructions adapted under license by [employer] (which disclaims liability or warranty for this information).  If you have questions about a medical condition or this instruction, always ask your healthcare professional. [employer] disclaims any warranty or liability for your use of this information. Patient Instructions History Introducing [facility]! [facility] introduces BioIQ patient portal. Now you can access parts of your medical record, email your doctor's office, and request medication refills online. 1. In your internet browser, go to [email or web address] 2. Click on the First Time User? Click Here link in the Sign In box. You will see the New Member Sign Up page. 3. Enter your BioIQ Access Code exactly as it appears below. You will not need to use this code after youve completed the sign-up process. If you do not sign up before the expiration date, you must request a new code. · BioIQ Access Code: [email or web address] Expires: 12/16/2018 11:48 AM 
 
4. Enter the last four digits of your Social Security Number (xxxx) and Date of Birth (mm/dd/yyyy) as indicated and click Submit. You will be taken to the next sign-up page. 5. Create a BioIQ ID. This will be your BioIQ login ID and cannot be changed, so think of one that is secure and easy to remember. 6. Create a BioIQ password. You can change your password at any time. 7. Enter your Password Reset Question and Answer. This can be used at a later time if you forget your password. 8. Enter your e-mail address. You will receive e-mail notification when new information is available in [address]. 9. Click Sign Up. You can now view and download portions of your medical record. 10. Click the Download Summary menu link to download a portable copy of your medical information. If you have questions, please visit the Frequently Asked Questions section of the BioIQ website. Remember, BioIQ is NOT to be used for urgent needs. For medical emergencies, dial 911. Now available from your iPhone and Android! Please provide this summary of care documentation to your next provider. Your primary care clinician is listed as Jaky Stroud. If you have any questions after today's visit, please call [phone].

## 2018-09-25 NOTE — PROGRESS NOTES
NARRATIVE:      The patient returns now 6 days post op transurethral resection of bladder tumor with pathology disclosing a low-grade bladder cancer without invasion    Catheter is removed and instructions given. The patient remains in a low risk group and I do not believe that ECG is indicated in this setting. 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 In April 2016 the patient had a negative bladder biopsy along with negative prostate biopsies, done for elevated PSA. The specimen did show inflammation    In August 2016 the patient had a low-grade noninvasive transitional cell cancer    The solitary tumor resection that would change the patient's risk status was the high-grade noninvasive transitional cell cancer that was only seen once and that was 2-1/2 years ago. It was essentially 2 years between resections of low-grade cancers. This interval was from August 2016 until September 2018                      IMPRESSION: Low-grade low stage bladder cancer recurrence at extended intervals      PLAN: No BCG is indicated. Patient will return in 3 months to begin his surveillance cystoscopies at 3 month intervals  It has been a little over one year since the patient's last PSA. The patient had a prostate biopsy in April 2016 prompted by a total PSA of 11.8 with a 16% free portion. Last year his PSA had come down to 8.4 and his free portion had increased to 20%          This visit exceeded *15 minutes and greater than 50% was counseling. The patient expresses understanding of the treatment plan and wishes to proceed    This dictation used voice recognition software and there may be mistakes.     Ortiz Valentine MD

## 2018-11-12 ENCOUNTER — TELEPHONE (OUTPATIENT)
Dept: UROLOGY | Age: 73
End: 2018-11-12

## 2018-11-12 NOTE — TELEPHONE ENCOUNTER
Dr. Mary Kate Diana, Mrs Clark Franklin called and said Sunitha Ruiz passed some thing yesterday with some gross hematuria . They seem to think it is a prostate stone . I tried to tell her the stones don't come out  But she kept telling me the last surgery you did was go in and crush all his prostate stone's. She ask if I would call you and I told her no. They don't like seeing any one but you. Can you please advise ?

## 2019-01-03 ENCOUNTER — OFFICE VISIT (OUTPATIENT)
Dept: UROLOGY | Age: 74
End: 2019-01-03

## 2019-01-03 ENCOUNTER — HOSPITAL ENCOUNTER (OUTPATIENT)
Dept: LAB | Age: 74
Discharge: HOME | End: 2019-01-03
Payer: MEDICARE

## 2019-01-03 VITALS
DIASTOLIC BLOOD PRESSURE: 73 MMHG | WEIGHT: 187 LBS | OXYGEN SATURATION: 97 % | SYSTOLIC BLOOD PRESSURE: 144 MMHG | BODY MASS INDEX: 26.18 KG/M2 | HEART RATE: 74 BPM | HEIGHT: 71 IN

## 2019-01-03 DIAGNOSIS — C67.9 MALIGNANT NEOPLASM OF URINARY BLADDER, UNSPECIFIED SITE (HCC): Primary | ICD-10-CM

## 2019-01-03 DIAGNOSIS — R31.29 MICROSCOPIC HEMATURIA: ICD-10-CM

## 2019-01-03 DIAGNOSIS — R97.20 ELEVATED PSA: ICD-10-CM

## 2019-01-03 PROCEDURE — 87086 URINE CULTURE/COLONY COUNT: CPT

## 2019-01-03 RX ORDER — FINASTERIDE 5 MG/1
5 TABLET, FILM COATED ORAL DAILY
Qty: 90 TAB | Refills: 3 | Status: SHIPPED | OUTPATIENT
Start: 2019-01-03 | End: 2019-01-24

## 2019-01-03 NOTE — PATIENT INSTRUCTIONS
Cystoscopy: Care Instructions  Your Care Instructions  Cystoscopy is a test. It uses a thin, lighted tube called a cystoscope to see the inside of the bladder and the urethra. The urethra is the tube that carries urine out of the body. This test is helpful because it lets your doctor see areas of your bladder and urethra that are hard to see on X-rays. It can help your doctor find bladder stones, tumors, bleeding, and infection. During this test, your doctor also can take tissue and urine samples. And if your doctor finds small stones or growths, he or she can remove them. In most cases the scope is in the bladder for less than 10 minutes. But the entire test may take 45 minutes or longer. You will probably get local anesthesia. This numbs a small part of your body. Or you may get spinal anesthesia, which numbs more of your body. Once in a while, doctors use general anesthesia. It makes you sleep during surgery. If you get a local anesthetic, you may be able to get up right after the test. But if you had spinal or general anesthesia, you will stay in the recovery room until you are able to walk or you have feeling again in your lower body. This usually takes about an hour. Your doctor may be able to tell you some of the results right after the test. But the complete results may take several days. Follow-up care is a key part of your treatment and safety. Be sure to make and go to all appointments, and call your doctor if you are having problems. It's also a good idea to know your test results and keep a list of the medicines you take. How can you care for yourself at home? Before the test  · If you are having a local anesthetic, you can eat and drink before the test.  · If you are having a spinal or general anesthetic, do not eat or drink anything for at least 8 hours before the test. Tell your doctor what medicines you take.   · If you are not staying overnight in the hospital, make sure you have someone who can drive you home after the test.  After the test  · If your doctor prescribed antibiotics, take them as directed. Do not stop taking them just because you feel better. You need to take the full course of antibiotics. · You may have some burning when you urinate for a day or two after the test. You may feel better if you drink more fluids. This may also help prevent an infection. · Your urine may have a pinkish color for a few days after the test.  When should you call for help? Call your doctor now or seek immediate medical care if:    · Your urine is still red or you see blood clots after you have urinated several times.     · You cannot pass urine 8 hours after the test.     · You get a fever or chills.     · You have pain in your belly or your back just below your rib cage. This is also called flank pain.    Watch closely for changes in your health, and be sure to contact your doctor if:    · You have pain or burning when you urinate. A burning sensation is normal for a day or two after the test. But call if it does not get better.     · You have a frequent urge to urinate but can pass only small amounts of urine.     · Your urine is pink, red, or cloudy or smells bad. It is normal for the urine to have a pinkish color for a few days after the test. But call if it does not get better.     · You do not get better as expected. Where can you learn more? Go to http://good-jerod.info/. Enter I135 in the search box to learn more about \"Cystoscopy: Care Instructions. \"  Current as of: March 21, 2018  Content Version: 11.8  © 0111-6997 [employer]. Care instructions adapted under license by [employer] (which disclaims liability or warranty for this information).  If you have questions about a medical condition or this instruction, always ask your healthcare professional. [address] any warranty or liability for your use of this information.

## 2019-01-03 NOTE — PROGRESS NOTES
[facility] [address] [facility], [place], [place] ~ [phone]                Infant Custody Release   2022            Admission Information     Date & Time  2022 Provider  Bryce Navas, [address] [place] [address]   3SE-N           Discharge instructions for my  have been explained and I understand these instructions. _______________________________________________________  Signature of person receiving instructions. INFANT CUSTODY RELEASE  I hereby certify that I am taking custody of my baby. Baby's Name Boy Serur    Corresponding ID Band # ___________________ verified.     Parent Signature:  _________________________________________________    RN Signature:  ____________________________________________________ RBV per Dr. Riley Aid urine sent for culture for Microscopic Hematuria.

## 2019-01-03 NOTE — PROGRESS NOTES
NARRATIVE:  Patient is a pleasant 69-year-old male  who is known to this office. The patient has a history of elevated PSA with biopsies in April 2016 showing no evidence of malignancy and there is evidence of chronic inflammation. The patient has been on finasteride and his last PSA had come down. 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 Since that time, the patient has had intermittent staining of his urine. He will have periods of time where the urine is completely painless and then he will have episodes of 1 or 2 episodes of discomfort his urine today is 2+ positive for blood and 1+ positive for leukocyte Estrace. He has had this phenomenon in the past and cultures have been negative but I do not want to take the risk. I discussed this with the patient and he is in agreement. We will culture today's urine and treat if needed. If negative he will return for cystoscopy and by that time we will have the PSA result. If that is continuing to show a low number we will rely on it even if it is not a free and total determination. If there are questions, we will do a free and total PSA                      IMPRESSION: Elevated PSA  Low-grade transitional cell carcinoma the urinary bladder requiring surveillance cystoscopy      PLAN: Ultra urine  Reschedule cystoscopy  Check PSA      This visit [id number]  minutes and greater than 50% was counseling.   The patient expresses understanding of the treatment plan and wishes to proceed    This dictation used voice recognition software and there may be mistakes.     Jean Marie Eckert MD

## 2019-01-03 NOTE — PROGRESS NOTES
Mr. Real Woods has a reminder for a \"due or due soon\" health maintenance. I have asked that he contact his primary care provider for follow-up on this health maintenance. RBV.  Per Dr. Chen Guess lab drawn in office today for PSA free and total.

## 2019-01-03 NOTE — PROGRESS NOTES
I called the patient to advise him that the PSA came back from his primary care doctor's office good at 7.4 and I do not believe we need to repeat it.   It was in the mid 8 range last year and the PSA that prompted the biopsy in the first place was over 11

## 2019-01-04 NOTE — PROGRESS NOTES
Shawn Chen please call the patient and advised him that the culture was negative and we will proceed with the cystoscopy when I come back

## 2019-01-05 LAB
BACTERIA SPEC CULT: NORMAL
SERVICE CMNT-IMP: NORMAL

## 2019-01-24 ENCOUNTER — OFFICE VISIT (OUTPATIENT)
Dept: UROLOGY | Age: 74
End: 2019-01-24

## 2019-01-24 ENCOUNTER — HOSPITAL ENCOUNTER (OUTPATIENT)
Dept: LAB | Age: 74
Discharge: HOME | End: 2019-01-24
Payer: MEDICARE

## 2019-01-24 VITALS
WEIGHT: 188 LBS | HEIGHT: 71 IN | BODY MASS INDEX: 26.32 KG/M2 | OXYGEN SATURATION: 94 % | SYSTOLIC BLOOD PRESSURE: 147 MMHG | DIASTOLIC BLOOD PRESSURE: 77 MMHG | HEART RATE: 76 BPM

## 2019-01-24 DIAGNOSIS — R31.29 HEMATURIA, MICROSCOPIC: ICD-10-CM

## 2019-01-24 DIAGNOSIS — C67.9 MALIGNANT NEOPLASM OF URINARY BLADDER, UNSPECIFIED SITE (HCC): Primary | ICD-10-CM

## 2019-01-24 PROCEDURE — 88112 CYTOPATH CELL ENHANCE TECH: CPT

## 2019-01-24 NOTE — PATIENT INSTRUCTIONS
Cystoscopy: Care Instructions  Your Care Instructions  Cystoscopy is a test. It uses a thin, lighted tube called a cystoscope to see the inside of the bladder and the urethra. The urethra is the tube that carries urine out of the body. This test is helpful because it lets your doctor see areas of your bladder and urethra that are hard to see on X-rays. It can help your doctor find bladder stones, tumors, bleeding, and infection. During this test, your doctor also can take tissue and urine samples. And if your doctor finds small stones or growths, he or she can remove them. In most cases the scope is in the bladder for less than 10 minutes. But the entire test may take 45 minutes or longer. You will probably get local anesthesia. This numbs a small part of your body. Or you may get spinal anesthesia, which numbs more of your body. Once in a while, doctors use general anesthesia. It makes you sleep during surgery. If you get a local anesthetic, you may be able to get up right after the test. But if you had spinal or general anesthesia, you will stay in the recovery room until you are able to walk or you have feeling again in your lower body. This usually takes about an hour. Your doctor may be able to tell you some of the results right after the test. But the complete results may take several days. Follow-up care is a key part of your treatment and safety. Be sure to make and go to all appointments, and call your doctor if you are having problems. It's also a good idea to know your test results and keep a list of the medicines you take. How can you care for yourself at home? Before the test  · If you are having a local anesthetic, you can eat and drink before the test.  · If you are having a spinal or general anesthetic, do not eat or drink anything for at least 8 hours before the test. Tell your doctor what medicines you take.   · If you are not staying overnight in the hospital, make sure you have someone who can drive you home after the test.  After the test  · If your doctor prescribed antibiotics, take them as directed. Do not stop taking them just because you feel better. You need to take the full course of antibiotics. · You may have some burning when you urinate for a day or two after the test. You may feel better if you drink more fluids. This may also help prevent an infection. · Your urine may have a pinkish color for a few days after the test.  When should you call for help? Call your doctor now or seek immediate medical care if:    · Your urine is still red or you see blood clots after you have urinated several times.     · You cannot pass urine 8 hours after the test.     · You get a fever or chills.     · You have pain in your belly or your back just below your rib cage. This is also called flank pain.    Watch closely for changes in your health, and be sure to contact your doctor if:    · You have pain or burning when you urinate. A burning sensation is normal for a day or two after the test. But call if it does not get better.     · You have a frequent urge to urinate but can pass only small amounts of urine.     · Your urine is pink, red, or cloudy or smells bad. It is normal for the urine to have a pinkish color for a few days after the test. But call if it does not get better.     · You do not get better as expected. Where can you learn more? Go to http://good-jerod.info/. Enter O644 in the search box to learn more about \"Cystoscopy: Care Instructions. \"  Current as of: March 20, 2018  Content Version: 11.9  © 4661-0685 [employer]. Care instructions adapted under license by [employer] (which disclaims liability or warranty for this information).  If you have questions about a medical condition or this instruction, always ask your healthcare professional. [address] any warranty or liability for your use of this information.

## 2019-01-24 NOTE — PROGRESS NOTES
[facility]  OFFICE PROCEDURE PROGRESS NOTE        Chart reviewed for the following:   Hever MICHELLE LPN, have reviewed the History, Physical and updated the Allergic reactions for Ray S [address] performed immediately prior to start of procedure:   Maureen Jacob LPN, have performed the following reviews on [address] prior to the start of the procedure:            * Patient was identified by name and date of birth   * Agreement on procedure being performed was verified  * Risks and Benefits explained to the patient  * Procedure site verified and marked as necessary  * Patient was positioned for comfort  * Consent was signed and verified     Time: 9:30AM      Date of procedure: 1/24/2019    Procedure performed by:  Ruthy Logan MD    Provider assisted by: Nakul Bullock LPN    Patient assisted by: self    How tolerated by patient: tolerated the procedure well with no complications    Post Procedural Pain Scale: 0 - No Hurt    Comments: none    Patient verbalized understanding of procedure and post procedure instructions.

## 2019-01-24 NOTE — PROGRESS NOTES
The patient is prepared for cystoscopy. He has been counseled regarding the risks which include, but are not limited to, infection, bleeding, injury, failure to diagnose, and possible need for additional procedures. He wishes to proceed, giving his informed consent. Procedure note: The patient is in the slightly inclined supine position where he is prepped and draped in sterile fashion. Lidocaine jelly has been inserted into the urethra and a suitable time has been permitted to elapse for establishment of anesthesia. A flexible cystoscope is inserted and video cystoscopy is carried out with the patient able to observe the monitor. The findings are as follows:  1. Pendulous urethra normal  2. Bulbar urethra normal  3. External sphincter normal  4. Prostate normal for age  11. Bladder neck  6. Trigone  7. Bladder wall the previously resected area that is at the 1 o'clock position just inside the left anterior bladder neck discloses some dystrophic calcification with some surrounding inflammatory change. There is no obvious exophytic papillary tumor formation. However, here to the last cystoscopy, the patient also has 2 areas on the right posterior dome that appeared to have either cholesterol deposition or calcification without any underlying epithelial change. A bladder wash is done on the bladder neck area and will be sent for cytology  Pertinent observations are pointed out to the patient and all the patient's questions are entertained. The procedure is terminated . Full discussion regarding the cystoscopic findings will be carried out. Following removal of the cystoscope, the patient is left in position for a suitable period of time and allowed to dress. Post procedure instructions are given. The patient tolerated the procedure well.     It should be noted that, as before, the patient does have some microscopic hematuria and some leukocyte Estrace in his urine specimen and the patient does advise me that he does have some bladder discomfort with the initiation of urine or at the end of urination but not during the actual flow itself. This would be consistent with the inflammatory process noted above    Additionally, we have discussed the issue with his prostate. The patient has had a PSA level of around 12 and we did biopsies that were negative. The patient has been on Proscar and his PSA had come down sequentially and in December 2017 it was 8.4 and more recently in December 2018 it was 7.4 prostate biopsies done in April 2016 did confirm inflammation with no evidence of tumor    I had an extended discussion with the patient and the wife regarding the implications. The alternatives are to proceed again with anesthesia cystoscopy with resection of the above-mentioned areas. Alternatively we can wait and make that decision based on the cytology.   We have decided that we will have the patient instead come back in 2 months for surveillance cystoscopy but be prepared to proceed with anesthesia cystoscopy the cytology becomes abnormal.  I do recommend that the patient needs to have a PSA determinations done on an every six-month basis and that the PSA in June

## 2019-01-25 ENCOUNTER — TELEPHONE (OUTPATIENT)
Dept: UROLOGY | Age: 74
End: 2019-01-25

## 2019-02-04 DIAGNOSIS — N40.0 BENIGN NON-NODULAR PROSTATIC HYPERPLASIA WITHOUT LOWER URINARY TRACT SYMPTOMS: ICD-10-CM

## 2019-02-04 RX ORDER — FINASTERIDE 5 MG/1
5 TABLET, FILM COATED ORAL DAILY
Qty: 90 TAB | Refills: 3 | Status: SHIPPED | OUTPATIENT
Start: 2019-02-04 | End: 2020-01-21 | Stop reason: SDUPTHER

## 2019-04-08 ENCOUNTER — HOSPITAL ENCOUNTER (OUTPATIENT)
Dept: LAB | Age: 74
Discharge: HOME | End: 2019-04-08
Payer: MEDICARE

## 2019-04-08 ENCOUNTER — OFFICE VISIT (OUTPATIENT)
Dept: UROLOGY | Age: 74
End: 2019-04-08

## 2019-04-08 VITALS
SYSTOLIC BLOOD PRESSURE: 110 MMHG | BODY MASS INDEX: 26.32 KG/M2 | DIASTOLIC BLOOD PRESSURE: 74 MMHG | WEIGHT: 188 LBS | OXYGEN SATURATION: 97 % | HEIGHT: 71 IN | HEART RATE: 70 BPM

## 2019-04-08 DIAGNOSIS — C67.9 MALIGNANT NEOPLASM OF URINARY BLADDER, UNSPECIFIED SITE (HCC): Primary | ICD-10-CM

## 2019-04-08 DIAGNOSIS — R97.20 ELEVATED PROSTATE SPECIFIC ANTIGEN (PSA): ICD-10-CM

## 2019-04-08 LAB
BILIRUB UR QL STRIP: NEGATIVE
GLUCOSE UR-MCNC: NEGATIVE MG/DL
KETONES P FAST UR STRIP-MCNC: NEGATIVE MG/DL
PH UR STRIP: 5 [PH] (ref 4.6–8)
PROT UR QL STRIP: NEGATIVE
SP GR UR STRIP: 1.02 (ref 1–1.03)
UA UROBILINOGEN AMB POC: NORMAL (ref 0.2–1)
URINALYSIS CLARITY POC: CLEAR
URINALYSIS COLOR POC: YELLOW
URINE BLOOD POC: NORMAL
URINE LEUKOCYTES POC: NORMAL
URINE NITRITES POC: NEGATIVE

## 2019-04-08 PROCEDURE — 88112 CYTOPATH CELL ENHANCE TECH: CPT

## 2019-04-08 NOTE — PATIENT INSTRUCTIONS
Cystoscopy: What to Expect at [facility] Your Recovery A cystoscopy is a procedure that lets a doctor look inside of the bladder and the urethra. The urethra is the tube that carries urine from the bladder to outside the body. The doctor uses a thin, lighted tool called a cystoscope. Your bladder is filled with fluid. This stretches the bladder so that your doctor can look closely at the inside of your bladder. After the cystoscopy, your urethra may be sore at first, and it may burn when you urinate for the first few days after the procedure. You may feel the need to urinate more often, and your urine may be pink. These symptoms should get better in 1 or 2 days. You will probably be able to go back to most of your usual activities in 1 or 2 days. This care sheet gives you a general idea about how long it will take for you to recover. But each person recovers at a different pace. Follow the steps below to get better as quickly as possible. How can you care for yourself at home? Activity 
  · Rest when you feel tired. Getting enough sleep will help you recover.  
  · Try to walk each day. Start by walking a little more than you did the day before. Bit by bit, increase the amount you walk. Walking boosts blood flow and helps prevent pneumonia and constipation.  
  · Avoid strenuous activities, such as bicycle riding, jogging, weight lifting, or aerobic exercise, until your doctor says it is okay.  
  · Ask your doctor when you can drive again.  
  · Most people are able to return to work within 1 or 2 days after the procedure.  
  · You may shower and take baths as usual.  
  · Ask your doctor when it is okay for you to have sex. Diet 
  · You can eat your normal diet. If your stomach is upset, try bland, low-fat foods like plain rice, broiled chicken, toast, and yogurt.  
  · Drink plenty of fluids (unless your doctor tells you not to). Medicines 
  · Take pain medicines exactly as directed. ? If the doctor gave you a prescription medicine for pain, take it as prescribed. ? If you are not taking a prescription pain medicine, ask your doctor if you can take an over-the-counter medicine.  
  · If you think your pain medicine is making you sick to your stomach: 
? Take your medicine after meals (unless your doctor has told you not to). ? Ask your doctor for a different pain medicine.  
  · If your doctor prescribed antibiotics, take them as directed. Do not stop taking them just because you feel better. You need to take the full course of antibiotics. Follow-up care is a key part of your treatment and safety. Be sure to make and go to all appointments, and call your doctor if you are having problems. It's also a good idea to know your test results and keep a list of the medicines you take. When should you call for help? Call 911 anytime you think you may need emergency care. For example, call if: 
  · You passed out (lost consciousness).  
  · You have severe trouble breathing.  
  · You have sudden chest pain and shortness of breath, or you cough up blood.  
  · You have severe belly pain.  
 Call your doctor now or seek immediate medical care if: 
  · You are sick to your stomach or cannot keep fluids down.  
  · Your urine is still red or you see blood clots after you have urinated several times.  
  · You have trouble passing urine or stool, especially if you have pain or swelling in your lower belly.  
  · You have signs of a blood clot, such as: 
? Pain in your calf, back of the knee, thigh, or groin. ? Redness and swelling in your leg or groin.  
  · You develop a fever or severe chills.  
  · You have pain in your back just below your rib cage. This is called flank pain.  
 Watch closely for changes in your health, and be sure to contact your doctor if: 
  · You have pain or burning when you urinate.  A burning feeling is normal for a day or two after the test, but call if it does not get better.  
  · You have a frequent urge to urinate but can pass only small amounts of urine.  
  · Your urine is pink, red, or cloudy, or smells bad. It is normal for the urine to have a pinkish color for a few days after the test, but call if it does not get better. Where can you learn more? Go to http://good-jerod.info/. Enter Y611 in the search box to learn more about \"Cystoscopy: What to Expect at Home. \" Current as of: March 20, 2018 Content Version: 11.9 © 3710-8410 [employer]. Care instructions adapted under license by [facility] (which disclaims liability or warranty for this information). If you have questions about a medical condition or this instruction, always ask your healthcare professional. [place] [address] any warranty or liability for your use of this information.

## 2019-04-08 NOTE — PROGRESS NOTES
Mr. Judson Leal has a reminder for a \"due or due soon\" health maintenance. I have asked that he contact his primary care provider for follow-up on this health maintenance.

## 2019-04-08 NOTE — PROGRESS NOTES
[facility] Chart reviewed for the following: 
 Guera Ramirez, have reviewed the History, Physical and updated the Allergic reactions for Ray S [address] performed immediately prior to start of procedure: 
 I, Marylu Loyola, have performed the following reviews [facility] [address] prior to the start of the procedure: 
         
* Patient was identified by name and date of birth * Agreement on procedure being performed was verified * Risks and Benefits explained to the patient * Procedure site verified and marked as necessary * Patient was positioned for comfort * Consent was signed and verified Time: 10:50 AM 
 
 
Date of procedure: 4/8/2019 Procedure performed by:  Wang Hooks MD 
 
Provider assisted by: Marylu Loyola MA Patient assisted by: wife How tolerated by patient: tolerated the procedure well with no complications Post Procedural Pain Scale: 0 - No Hurt Comments: Patient verbalized understanding of procedure and post procedure instructions.

## 2019-04-08 NOTE — PROGRESS NOTES
Lab Results Component Value Date/Time  
 Prostate Specific Ag 8.4 (H) 08/07/2017 10:00 AM  
 Prostate Specific Ag 11.8 (H) 02/24/2016 04:00 PM  
 Prostate Specific Ag 5.7 (H) 03/13/2012 10:27 AM  
 Prostate Specific Ag 5.3 (H) 09/13/2011 01:22 PM  
 Prostate Specific Ag 5.5 ng/mL 03/10/2011 Prostate Specific Ag 4.7 (H) 10/19/2010 03:00 PM  
 PSA, Free 1.70 08/07/2017 10:00 AM  
 PSA, Free 1.90 02/24/2016 04:00 PM  
 PSA, % Free 20.2 08/07/2017 10:00 AM  
 PSA, % Free 16.1 02/24/2016 04:00 PM  
 The patient is prepared for cystoscopy. He has been counseled regarding the risks which include, but are not limited to, infection, bleeding, injury, failure to diagnose, and possible need for additional procedures. He wishes to proceed, giving his informed consent. Procedure note: The patient is in the slightly inclined supine position where he is prepped and draped in sterile fashion. Lidocaine jelly has been inserted into the urethra and a suitable time has been permitted to elapse for establishment of anesthesia. A flexible cystoscope is inserted and video cystoscopy is carried out with the patient able to observe the monitor. The findings are as follows: 1. Pendulous urethra normal 
2. Bulbar urethra normal 
3. External sphincter normal 
4. Prostate normal for age 5. Bladder neck 6. Trigone 7. Bladder wall bladder wall is carefully examined and there are dystrophic calcifications in the area where previous cautery was done. There is an area left lateral wall just inside the median lobe and can only be seen by flexing the scope in retrograde manner. This area is washed for cytology but appears to be inflammation only no blurry change Pertinent observations are pointed out to the patient and all the patient's questions are entertained. The procedure is terminated . Full discussion regarding the cystoscopic findings will be carried out. Following removal of the cystoscope, the patient is left in position for a suitable period of time and allowed to dress. Post procedure instructions are given. The patient tolerated the procedure well. The PSAs are listed as above and it should be noted that the patient was begun on finasteride. We will be rechecking his PSA around August or September

## 2019-04-11 ENCOUNTER — DOCUMENTATION ONLY (OUTPATIENT)
Dept: UROLOGY | Age: 74
End: 2019-04-11

## 2019-04-23 NOTE — PROGRESS NOTES
Jg Cam please call patient and advise that cytology was inconclusive, so FISH chromosome test was done and is negative

## 2019-07-15 ENCOUNTER — OFFICE VISIT (OUTPATIENT)
Dept: UROLOGY | Age: 74
End: 2019-07-15

## 2019-07-15 VITALS
DIASTOLIC BLOOD PRESSURE: 81 MMHG | BODY MASS INDEX: 26.18 KG/M2 | HEIGHT: 71 IN | OXYGEN SATURATION: 96 % | SYSTOLIC BLOOD PRESSURE: 151 MMHG | HEART RATE: 65 BPM | WEIGHT: 187 LBS

## 2019-07-15 DIAGNOSIS — C67.9 MALIGNANT NEOPLASM OF URINARY BLADDER, UNSPECIFIED SITE (HCC): Primary | ICD-10-CM

## 2019-07-15 LAB
BILIRUB UR QL STRIP: NEGATIVE
GLUCOSE UR-MCNC: NEGATIVE MG/DL
KETONES P FAST UR STRIP-MCNC: NEGATIVE MG/DL
PH UR STRIP: 5.5 [PH] (ref 4.6–8)
PROT UR QL STRIP: NEGATIVE
SP GR UR STRIP: 1.03 (ref 1–1.03)
UA UROBILINOGEN AMB POC: NORMAL (ref 0.2–1)
URINALYSIS CLARITY POC: CLEAR
URINALYSIS COLOR POC: YELLOW
URINE BLOOD POC: NORMAL
URINE LEUKOCYTES POC: NEGATIVE
URINE NITRITES POC: NEGATIVE

## 2019-07-15 NOTE — PROGRESS NOTES
Mr. Isaias Caraballo has a reminder for a \"due or due soon\" health maintenance. I have asked that he contact his primary care provider for follow-up on this health maintenance.

## 2019-07-15 NOTE — PROGRESS NOTES
The patient is prepared for cystoscopy. He has been counseled regarding the risks which include, but are not limited to, infection, bleeding, injury, failure to diagnose, and possible need for additional procedures. He wishes to proceed, giving his informed consent. Procedure note: The patient is in the slightly inclined supine position where he is prepped and draped in sterile fashion. Lidocaine jelly has been inserted into the urethra and a suitable time has been permitted to elapse for establishment of anesthesia. A flexible cystoscope is inserted and video cystoscopy is carried out with the patient able to observe the monitor. The findings are as follows:  1. Pendulous urethra normal  2. Bulbar urethra there is a slight stricture in the mid bulb through which the scope passes with ease  3. External sphincter normal  4. Prostate encroachment with convergence appropriate for age  11. Bladder neck normal  6. Trigone normal  7. Bladder wall trabeculation and scar present from previous resections but no evidence of epithelial recurrence    Pertinent observations are pointed out to the patient and all the patient's questions are entertained. The procedure is terminated . Full discussion regarding the cystoscopic findings will be carried out. Following removal of the cystoscope, the patient is left in position for a suitable period of time and allowed to dress. Post procedure instructions are given. The patient tolerated the procedure well. The patient's last pathology was September 2018 so patient will return in 3 months. He will undergo a surveillance cystoscopy at that time.   The patient is due to see his primary care physician prior to that and we will see about getting a free and total PSA obtained at the time of that venipuncture

## 2019-07-15 NOTE — PATIENT INSTRUCTIONS
Cystoscopy: Care Instructions  Your Care Instructions  Cystoscopy is a test. It uses a thin, lighted tube called a cystoscope to see the inside of the bladder and the urethra. The urethra is the tube that carries urine out of the body. This test is helpful because it lets your doctor see areas of your bladder and urethra that are hard to see on X-rays. It can help your doctor find bladder stones, tumors, bleeding, and infection. During this test, your doctor also can take tissue and urine samples. And if your doctor finds small stones or growths, he or she can remove them. In most cases the scope is in the bladder for less than 10 minutes. But the entire test may take 45 minutes or longer. You will probably get local anesthesia. This numbs a small part of your body. Or you may get spinal anesthesia, which numbs more of your body. Once in a while, doctors use general anesthesia. It makes you sleep during surgery. If you get a local anesthetic, you may be able to get up right after the test. But if you had spinal or general anesthesia, you will stay in the recovery room until you are able to walk or you have feeling again in your lower body. This usually takes about an hour. Your doctor may be able to tell you some of the results right after the test. But the complete results may take several days. Follow-up care is a key part of your treatment and safety. Be sure to make and go to all appointments, and call your doctor if you are having problems. It's also a good idea to know your test results and keep a list of the medicines you take. How can you care for yourself at home? Before the test  · If you are having a local anesthetic, you can eat and drink before the test.  · If you are having a spinal or general anesthetic, do not eat or drink anything for at least 8 hours before the test. Tell your doctor what medicines you take.   · If you are not staying overnight in the hospital, make sure you have someone who can drive you home after the test.  After the test  · If your doctor prescribed antibiotics, take them as directed. Do not stop taking them just because you feel better. You need to take the full course of antibiotics. · You may have some burning when you urinate for a day or two after the test. You may feel better if you drink more fluids. This may also help prevent an infection. · Your urine may have a pinkish color for a few days after the test.  When should you call for help? Call your doctor now or seek immediate medical care if:    · Your urine is still red or you see blood clots after you have urinated several times.     · You cannot pass urine 8 hours after the test.     · You get a fever or chills.     · You have pain in your belly or your back just below your rib cage. This is also called flank pain.    Watch closely for changes in your health, and be sure to contact your doctor if:    · You have pain or burning when you urinate. A burning sensation is normal for a day or two after the test. But call if it does not get better.     · You have a frequent urge to urinate but can pass only small amounts of urine.     · Your urine is pink, red, or cloudy or smells bad. It is normal for the urine to have a pinkish color for a few days after the test. But call if it does not get better.     · You do not get better as expected. Where can you learn more? Go to http://good-jerod.info/. Enter G814 in the search box to learn more about \"Cystoscopy: Care Instructions. \"  Current as of: March 20, 2018  Content Version: 11.9  © 0210-6617 [employer]. Care instructions adapted under license by [employer] (which disclaims liability or warranty for this information).  If you have questions about a medical condition or this instruction, always ask your healthcare professional. [address] any warranty or liability for your use of this information.

## 2019-07-15 NOTE — PROGRESS NOTES
[facility]  OFFICE PROCEDURE PROGRESS NOTE        Chart reviewed for the following:   Agus MICHELLE LPN, have reviewed the History, Physical and updated the Allergic reactions for Ray S [address] performed immediately prior to start of procedure:   Agus MICHELLE LPN, have performed the following reviews on [address] prior to the start of the procedure:            * Patient was identified by name and date of birth   * Agreement on procedure being performed was verified  * Risks and Benefits explained to the patient  * Procedure site verified and marked as necessary  * Patient was positioned for comfort  * Consent was signed and verified     Time: 09:37      Date of procedure: 7/15/2019    Procedure performed by:  Marcus Griffith MD    Provider assisted by: Naheed Galindo LPN    Patient assisted by: wife    How tolerated by patient: tolerated the procedure well with no complications    Post Procedural Pain Scale: 0 - No Hurt    Comments: Patient verbalized understanding of procedure and post procedure instructions.

## 2019-10-28 ENCOUNTER — OFFICE VISIT (OUTPATIENT)
Dept: UROLOGY | Age: 74
End: 2019-10-28

## 2019-10-28 VITALS
HEIGHT: 71 IN | HEART RATE: 69 BPM | OXYGEN SATURATION: 97 % | DIASTOLIC BLOOD PRESSURE: 74 MMHG | SYSTOLIC BLOOD PRESSURE: 148 MMHG | WEIGHT: 189 LBS | BODY MASS INDEX: 26.46 KG/M2

## 2019-10-28 DIAGNOSIS — C67.9 MALIGNANT NEOPLASM OF URINARY BLADDER, UNSPECIFIED SITE (HCC): Primary | ICD-10-CM

## 2019-10-28 LAB
BILIRUB UR QL STRIP: NEGATIVE
GLUCOSE UR-MCNC: NEGATIVE MG/DL
KETONES P FAST UR STRIP-MCNC: NEGATIVE MG/DL
PH UR STRIP: 5.5 [PH] (ref 4.6–8)
PROT UR QL STRIP: NEGATIVE
SP GR UR STRIP: 1.02 (ref 1–1.03)
UA UROBILINOGEN AMB POC: NORMAL (ref 0.2–1)
URINALYSIS CLARITY POC: CLEAR
URINALYSIS COLOR POC: YELLOW
URINE BLOOD POC: NEGATIVE
URINE LEUKOCYTES POC: NORMAL
URINE NITRITES POC: NEGATIVE

## 2019-10-28 NOTE — PATIENT INSTRUCTIONS
Cystoscopy: Care Instructions  Your Care Instructions  Cystoscopy is a test. It uses a thin, lighted tube called a cystoscope to see the inside of the bladder and the urethra. The urethra is the tube that carries urine out of the body. This test is helpful because it lets your doctor see areas of your bladder and urethra that are hard to see on X-rays. It can help your doctor find bladder stones, tumors, bleeding, and infection. During this test, your doctor also can take tissue and urine samples. And if your doctor finds small stones or growths, he or she can remove them. In most cases the scope is in the bladder for less than 10 minutes. But the entire test may take 45 minutes or longer. You will probably get local anesthesia. This numbs a small part of your body. Or you may get spinal anesthesia, which numbs more of your body. Once in a while, doctors use general anesthesia. It makes you sleep during surgery. If you get a local anesthetic, you may be able to get up right after the test. But if you had spinal or general anesthesia, you will stay in the recovery room until you are able to walk or you have feeling again in your lower body. This usually takes about an hour. Your doctor may be able to tell you some of the results right after the test. But the complete results may take several days. Follow-up care is a key part of your treatment and safety. Be sure to make and go to all appointments, and call your doctor if you are having problems. It's also a good idea to know your test results and keep a list of the medicines you take. How can you care for yourself at home? Before the test  · If you are having a local anesthetic, you can eat and drink before the test.  · If you are having a spinal or general anesthetic, do not eat or drink anything for at least 8 hours before the test. Tell your doctor what medicines you take.   · If you are not staying overnight in the hospital, make sure you have someone who can drive you home after the test.  After the test  · If your doctor prescribed antibiotics, take them as directed. Do not stop taking them just because you feel better. You need to take the full course of antibiotics. · You may have some burning when you urinate for a day or two after the test. You may feel better if you drink more fluids. This may also help prevent an infection. · Your urine may have a pinkish color for a few days after the test.  When should you call for help? Call your doctor now or seek immediate medical care if:    · Your urine is still red or you see blood clots after you have urinated several times.     · You cannot pass urine 8 hours after the test.     · You get a fever or chills.     · You have pain in your belly or your back just below your rib cage. This is also called flank pain.    Watch closely for changes in your health, and be sure to contact your doctor if:    · You have pain or burning when you urinate. A burning sensation is normal for a day or two after the test. But call if it does not get better.     · You have a frequent urge to urinate but can pass only small amounts of urine.     · Your urine is pink, red, or cloudy or smells bad. It is normal for the urine to have a pinkish color for a few days after the test. But call if it does not get better.     · You do not get better as expected. Where can you learn more? Go to http://good-jerod.info/. Enter V250 in the search box to learn more about \"Cystoscopy: Care Instructions. \"  Current as of: December 19, 2018  Content Version: 12.2  © 7931-5118 [employer]. Care instructions adapted under license by [employer] (which disclaims liability or warranty for this information).  If you have questions about a medical condition or this instruction, always ask your healthcare professional. [address] any warranty or liability for your use of this information.

## 2019-10-28 NOTE — PROGRESS NOTES
[facility]  OFFICE PROCEDURE PROGRESS NOTE        Chart reviewed for the following:   Eriberto MICHELLE LPN, have reviewed the History, Physical and updated the Allergic reactions for Ray S [address] performed immediately prior to start of procedure:   Fran Murillo LPN, have performed the following reviews on [address] prior to the start of the procedure:            * Patient was identified by name and date of birth   * Agreement on procedure being performed was verified  * Risks and Benefits explained to the patient  * Procedure site verified and marked as necessary  * Patient was positioned for comfort  * Consent was signed and verified     Time: 9:51AM      Date of procedure: 10/28/2019    Procedure performed by:  Luis Crowe MD    Provider assisted by: Edmundo Fernandes LPN    Patient assisted by: self    How tolerated by patient: tolerated the procedure well with no complications    Post Procedural Pain Scale: 0 - No Hurt    Comments: Patient verbalized understanding of procedure and post procedure instructions.

## 2019-10-28 NOTE — PROGRESS NOTES
Mr. Ze Gonzalez has a reminder for a \"due or due soon\" health maintenance. I have asked that he contact his primary care provider for follow-up on this health maintenance.

## 2019-10-28 NOTE — PROGRESS NOTES
The patient is prepared for cystoscopy. He has been counseled regarding the risks which include, but are not limited to, infection, bleeding, injury, failure to diagnose, and possible need for additional procedures. He wishes to proceed, giving his informed consent. Procedure note: The patient is in the slightly inclined supine position where he is prepped and draped in sterile fashion. Lidocaine jelly has been inserted into the urethra and a suitable time has been permitted to elapse for establishment of anesthesia. A flexible cystoscope is inserted and video cystoscopy is carried out with the patient able to observe the monitor. The findings are as follows:  1. Pendulous urethra normal  2. Bulbar urethra normal  3. External sphincter normal  4. Prostate normal for age  11. Bladder neck  6. Trigone  7. Bladder wall all above structures are normal and consistent with patient age. There is predictable scar but no evidence of epithelial recurrence    Pertinent observations are pointed out to the patient and all the patient's questions are entertained. The procedure is terminated . Full discussion regarding the cystoscopic findings will be carried out. Following removal of the cystoscope, the patient is left in position for a suitable period of time and allowed to dress. Post procedure instructions are given. The patient tolerated the procedure well. 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 The patient did get a PSA done through his primary care within the past several weeks and was advised that it was normal.  The patient has been on finasteride now for over 6 months and I suspect that he has now got a new line within the normal range.   The patient has been advised of the pending closure of this office and the need to continue surveillance cystoscopies at 3-month intervals and to have the accepting urologist take over his finasteride    This visit, independent of the cystoscopy, exceeded 15 minutes for complete explanation

## 2019-10-28 NOTE — PROGRESS NOTES
Mr. Brandy Laboy has a reminder for a \"due or due soon\" health maintenance. I have asked that he contact his primary care provider for follow-up on this health maintenance.

## 2020-01-04 ENCOUNTER — HOSPITAL ENCOUNTER (EMERGENCY)
Age: 75
Discharge: HOME | End: 2020-01-04
Attending: EMERGENCY MEDICINE
Payer: MEDICARE

## 2020-01-04 VITALS
WEIGHT: 190 LBS | SYSTOLIC BLOOD PRESSURE: 150 MMHG | BODY MASS INDEX: 26.5 KG/M2 | RESPIRATION RATE: 16 BRPM | DIASTOLIC BLOOD PRESSURE: 81 MMHG | HEART RATE: 62 BPM | TEMPERATURE: 98 F | OXYGEN SATURATION: 93 %

## 2020-01-04 DIAGNOSIS — R42 VERTIGO: Primary | ICD-10-CM

## 2020-01-04 LAB
ANION GAP SERPL CALC-SCNC: 4 MMOL/L (ref 3–18)
ATRIAL RATE: 58 BPM
BASOPHILS # BLD: 0.1 K/UL (ref 0–0.1)
BASOPHILS NFR BLD: 1 % (ref 0–2)
BUN SERPL-MCNC: 17 MG/DL (ref 7–18)
BUN/CREAT SERPL: 12 (ref 12–20)
CALCIUM SERPL-MCNC: 8.9 MG/DL (ref 8.5–10.1)
CALCULATED P AXIS, ECG09: 45 DEGREES
CALCULATED R AXIS, ECG10: 49 DEGREES
CALCULATED T AXIS, ECG11: 31 DEGREES
CHLORIDE SERPL-SCNC: 107 MMOL/L (ref 100–111)
CO2 SERPL-SCNC: 28 MMOL/L (ref 21–32)
CREAT SERPL-MCNC: 1.4 MG/DL (ref 0.6–1.3)
DIAGNOSIS, 93000: NORMAL
DIFFERENTIAL METHOD BLD: ABNORMAL
EOSINOPHIL # BLD: 0.4 K/UL (ref 0–0.4)
EOSINOPHIL NFR BLD: 4 % (ref 0–5)
ERYTHROCYTE [DISTWIDTH] IN BLOOD BY AUTOMATED COUNT: 12.9 % (ref 11.6–14.5)
GLUCOSE SERPL-MCNC: 117 MG/DL (ref 74–99)
HCT VFR BLD AUTO: 44.3 % (ref 36–48)
HGB BLD-MCNC: 14.8 G/DL (ref 13–16)
LYMPHOCYTES # BLD: 2.1 K/UL (ref 0.9–3.6)
LYMPHOCYTES NFR BLD: 23 % (ref 21–52)
MCH RBC QN AUTO: 31.6 PG (ref 24–34)
MCHC RBC AUTO-ENTMCNC: 33.4 G/DL (ref 31–37)
MCV RBC AUTO: 94.7 FL (ref 74–97)
MONOCYTES # BLD: 0.7 K/UL (ref 0.05–1.2)
MONOCYTES NFR BLD: 8 % (ref 3–10)
NEUTS SEG # BLD: 5.7 K/UL (ref 1.8–8)
NEUTS SEG NFR BLD: 64 % (ref 40–73)
P-R INTERVAL, ECG05: 176 MS
PLATELET # BLD AUTO: 176 K/UL (ref 135–420)
PMV BLD AUTO: 11.7 FL (ref 9.2–11.8)
POTASSIUM SERPL-SCNC: 4.5 MMOL/L (ref 3.5–5.5)
Q-T INTERVAL, ECG07: 412 MS
QRS DURATION, ECG06: 82 MS
QTC CALCULATION (BEZET), ECG08: 404 MS
RBC # BLD AUTO: 4.68 M/UL (ref 4.7–5.5)
SODIUM SERPL-SCNC: 139 MMOL/L (ref 136–145)
TROPONIN I SERPL-MCNC: <0.02 NG/ML (ref 0–0.04)
VENTRICULAR RATE, ECG03: 58 BPM
WBC # BLD AUTO: 9 K/UL (ref 4.6–13.2)

## 2020-01-04 PROCEDURE — 85025 COMPLETE CBC W/AUTO DIFF WBC: CPT

## 2020-01-04 PROCEDURE — 93005 ELECTROCARDIOGRAM TRACING: CPT

## 2020-01-04 PROCEDURE — 99284 EMERGENCY DEPT VISIT MOD MDM: CPT

## 2020-01-04 PROCEDURE — 74011250636 HC RX REV CODE- 250/636: Performed by: EMERGENCY MEDICINE

## 2020-01-04 PROCEDURE — 80048 BASIC METABOLIC PNL TOTAL CA: CPT

## 2020-01-04 PROCEDURE — 84484 ASSAY OF TROPONIN QUANT: CPT

## 2020-01-04 RX ORDER — MECLIZINE HCL 12.5 MG 12.5 MG/1
25 TABLET ORAL
Status: COMPLETED | OUTPATIENT
Start: 2020-01-04 | End: 2020-01-04

## 2020-01-04 RX ORDER — MECLIZINE HYDROCHLORIDE 25 MG/1
TABLET ORAL
Qty: 20 TAB | Refills: 0 | Status: SHIPPED | OUTPATIENT
Start: 2020-01-04 | End: 2021-07-12 | Stop reason: ALTCHOICE

## 2020-01-04 RX ADMIN — SODIUM CHLORIDE 1000 ML: 900 INJECTION, SOLUTION INTRAVENOUS at 06:44

## 2020-01-04 RX ADMIN — MECLIZINE 25 MG: 12.5 TABLET ORAL at 06:44

## 2020-01-04 NOTE — ED NOTES
7:20 AM :Pt care assumed from Dr. Daryl Beltrán , ED provider. Pt complaint(s), current treatment plan, progression and available diagnostic results have been discussed thoroughly. Rounding occurred: yes  Intended Disposition: Home   Pending diagnostic reports and/or labs (please list): getting IVF, and meclizine. Neuro grossly intact. Likely Benign vertigo. Reassess. Labs reassuring. After IVF and meclizine, pt feeling much better, ambulated to bathroom and dizziness resolved. Neuro remains grossly intact. PCP fu, ENT fu if needed        I have reassessed the patient. I have discussed the workup, results and plan with the patient and patient is in agreement. Patient is feeling better. Patient will be prescribed meclizine. Patient was discharge in stable condition. Patient was given outpatient follow up. Patient is to return to emergency department if any new or worsening condition.  8:40 AM January 04, 2020

## 2020-01-04 NOTE — DISCHARGE INSTRUCTIONS
Patient Education        Dizziness: Care InstructionsPatient Education        Vertigo: Exercises  Introduction  Here are some examples of exercises for you to try. The exercises may be suggested for a condition or for rehabilitation. Start each exercise slowly. Ease off the exercises if you start to have pain. You will be told when to start these exercises and which ones will work best for you. How to do the exercises  Exercise 1    1. Stand with a chair in front of you and a wall behind you. If you begin to fall, you may use them for support. 2. Stand with your feet together and your arms at your sides. 3. Move your head up and down 10 times. Exercise 2    1. Move your head side to side 10 times. Exercise 3    1. Move your head diagonally up and down 10 times. Exercise 4    1. Move your head diagonally up and down 10 times on the other side. Follow-up care is a key part of your treatment and safety. Be sure to make and go to all appointments, and call your doctor if you are having problems. It's also a good idea to know your test results and keep a list of the medicines you take. Where can you learn more? Go to http://goodXoomsysjerod.info/. Enter F349 in the search box to learn more about \"Vertigo: Exercises. \"  Current as of: October 21, 2018  Content Version: 12.2  © 2620-3095 [employer]. Care instructions adapted under license by [employer] (which disclaims liability or warranty for this information). If you have questions about a medical condition or this instruction, always ask your healthcare professional. [place] [address] any warranty or liability for your use of this information. Your Care Instructions  Dizziness is the feeling of unsteadiness or fuzziness in your head. It is different than having vertigo, which is a feeling that the room is spinning or that you are moving or falling.  It is also different from lightheadedness, which is the feeling that you are about to faint. It can be hard to know what causes dizziness. Some people feel dizzy when they have migraine headaches. Sometimes bouts of flu can make you feel dizzy. Some medical conditions, such as heart problems or high blood pressure, can make you feel dizzy. Many medicines can cause dizziness, including medicines for high blood pressure, pain, or anxiety. If a medicine causes your symptoms, your doctor may recommend that you stop or change the medicine. If it is a problem with your heart, you may need medicine to help your heart work better. If there is no clear reason for your symptoms, your doctor may suggest watching and waiting for a while to see if the dizziness goes away on its own. Follow-up care is a key part of your treatment and safety. Be sure to make and go to all appointments, and call your doctor if you are having problems. It's also a good idea to know your test results and keep a list of the medicines you take. How can you care for yourself at home? · If your doctor recommends or prescribes medicine, take it exactly as directed. Call your doctor if you think you are having a problem with your medicine. · Do not drive while you feel dizzy. · Try to prevent falls. Steps you can take include:  ? Using nonskid mats, adding grab bars near the tub, and using night-lights. ? Clearing your home so that walkways are free of anything you might trip on.  ? Letting family and friends know that you have been feeling dizzy. This will help them know how to help you. When should you call for help? Call 911 anytime you think you may need emergency care. For example, call if:    · You passed out (lost consciousness).     · You have dizziness along with symptoms of a heart attack. These may include:  ? Chest pain or pressure, or a strange feeling in the chest.  ? Sweating. ? Shortness of breath. ? Nausea or vomiting.   ? Pain, pressure, or a strange feeling in the back, neck, jaw, or upper belly or in one or both shoulders or arms. ? Lightheadedness or sudden weakness. ? A fast or irregular heartbeat.     · You have symptoms of a stroke. These may include:  ? Sudden numbness, tingling, weakness, or loss of movement in your face, arm, or leg, especially on only one side of your body. ? Sudden vision changes. ? Sudden trouble speaking. ? Sudden confusion or trouble understanding simple statements. ? Sudden problems with walking or balance. ? A sudden, severe headache that is different from past headaches.    Call your doctor now or seek immediate medical care if:    · You feel dizzy and have a fever, headache, or ringing in your ears.     · You have new or increased nausea and vomiting.     · Your dizziness does not go away or comes back.    Watch closely for changes in your health, and be sure to contact your doctor if:    · You do not get better as expected. Where can you learn more? Go to http://good-jerod.info/. Enter A251 in the search box to learn more about \"Dizziness: Care Instructions. \"  Current as of: June 26, 2019  Content Version: 12.2  © 2148-7375 [employer]. Care instructions adapted under license by [employer] (which disclaims liability or warranty for this information). If you have questions about a medical condition or this instruction, always ask your healthcare professional. [place] [address] any warranty or liability for your use of this information. Patient Education        Vertigo: Care Instructions  Your Care Instructions    Vertigo is the feeling that you or your surroundings are moving when there is no actual movement. It is often described as a feeling of spinning, whirling, falling, or tilting. Vertigo may make you vomit or feel nauseated. You may have trouble standing or walking and may lose your balance.   Vertigo is often related to an inner ear problem, but it can have other more serious causes. If vertigo continues, you may need more tests to find its cause. Follow-up care is a key part of your treatment and safety. Be sure to make and go to all appointments, and call your doctor if you are having problems. It's also a good idea to know your test results and keep a list of the medicines you take. How can you care for yourself at home? · Do not lie flat on your back. Prop yourself up slightly. This may reduce the spinning feeling. Keep your eyes open. · Move slowly so that you do not fall. · If your doctor recommends medicine, take it exactly as directed. · Do not drive while you are having vertigo. Certain exercises, called Noriega-Daroff exercises, can help decrease vertigo. To do Noriega-Daroff exercises:  · Sit on the edge of a bed or sofa and quickly lie down on the side that causes the worst vertigo. Lie on your side with your ear down. · Stay in this position for at least 30 seconds or until the vertigo goes away. · Sit up. If this causes vertigo, wait for it to stop. · Repeat the procedure on the other side. · Repeat this 10 times. Do these exercises 2 times a day until the vertigo is gone. When should you call for help? Call 911 anytime you think you may need emergency care. For example, call if:    · You passed out (lost consciousness).     · You have symptoms of a stroke. These may include:  ? Sudden numbness, tingling, weakness, or loss of movement in your face, arm, or leg, especially on only one side of your body. ? Sudden vision changes. ? Sudden trouble speaking. ? Sudden confusion or trouble understanding simple statements. ? Sudden problems with walking or balance.   ? A sudden, severe headache that is different from past headaches.    Call your doctor now or seek immediate medical care if:    · Vertigo occurs with a fever, a headache, or ringing in your ears.     · You have new or increased nausea and vomiting.   Luis Cruz closely for changes in your health, and be sure to contact your doctor if:    · Vertigo gets worse or happens more often.     · Vertigo has not gotten better after 2 weeks. Where can you learn more? Go to http://good-jerod.info/. Enter J904 in the search box to learn more about \"Vertigo: Care Instructions. \"  Current as of: October 21, 2018  Content Version: 12.2  © 3398-0862 [employer]. Care instructions adapted under license by [employer] (which disclaims liability or warranty for this information). If you have questions about a medical condition or this instruction, always ask your healthcare professional. [place] [address] any warranty or liability for your use of this information.

## 2020-01-04 NOTE — ED PROVIDER NOTES
EMERGENCY DEPARTMENT HISTORY AND PHYSICAL EXAM    6:31 AM      Date: 1/4/2020  Patient Name: Cyn Cormier    History of Presenting Illness     Chief Complaint   Patient presents with    Dizziness         History Provided By: Patient    Chief Complaint: dizziness  Duration:  Hours  Timing:  Intermittent  Location: NA  Quality: NA  Severity: Moderate  Modifying Factors: worse when lying flat  Associated Symptoms: nausea      Additional History (Context): Cyn Cormier is a 75 y.o. male with hypertension and bladder ca who presents with dizziness. Patient states he woke around 3 AM this morning to get up to use the bathroom when he felt room spinning sensation. He noticed some eventual improvement when he lied down. But then when he awoke he had persistent symptoms especially after rolling onto his side. Reports intermittently feels as if the room is spinning. Denies any chest pain or shortness of breath. No prior episodes. No recent falls or injury. Is not on anticoagulation. Has not taken anything for this. No other complaints. .    PCP: Madelyn Landon MD    Current Facility-Administered Medications   Medication Dose Route Frequency Provider Last Rate Last Dose    sodium chloride 0.9 % bolus infusion 1,000 mL  1,000 mL IntraVENous ONCE Shan Dayana Harrison DO        meclizine (ANTIVERT) tablet 25 mg  25 mg Oral NOW Faawd Hairston, DO         Current Outpatient Medications   Medication Sig Dispense Refill    finasteride (PROSCAR) 5 mg tablet Take 1 Tab by mouth daily. 90 Tab 3    ketorolac (ACULAR) 0.5 % ophthalmic solution       HYDROcodone-acetaminophen (NORCO)  mg tablet Take 1 Tab by mouth every six (6) hours as needed for Pain. Max Daily Amount: 4 Tabs. 40 Tab 0    tamsulosin (FLOMAX) 0.4 mg capsule One cap q d pc 90 Cap 3    latanoprost (XALATAN) 0.005 % ophthalmic solution Administer 1 Drop to left eye nightly.  omeprazole (PRILOSEC) 10 mg capsule Take 10 mg by mouth daily.  simvastatin (ZOCOR) 10 mg tablet Take 20 mg by mouth nightly.  lisinopril (PRINIVIL, ZESTRIL) 5 mg tablet Take  by mouth daily. Past History     Past Medical History:  Past Medical History:   Diagnosis Date    Bladder cancer ([address] 02/24/2016    Elevated prostate specific antigen (PSA)     Essential hypertension     Hiatal hernia     Nodular prostate        Past Surgical History:  Past Surgical History:   Procedure Laterality Date    HX OTHER SURGICAL      MELANOMA EXCISION FROM THE NOSE    HX RETINAL DETACHMENT REPAIR Right 2010    HX UROLOGICAL  09/19/2018    Transurethral resection of bladder tumor and removal of prostatic diverticular stones and instillation of mitomycin C. path: noninvasive low-grade papillary urothelial carcinoma. Dr. Kimberlee Rdz  UROLOGICAL  01/11/2017    Cysto, bladder bx. path benign. Dr. Kimberlee Rdz  UROLOGICAL  08/03/2016    Cystoscopy, bladder tumor resection and fulguration of multiple bladder tumors followed by mitomycin-C instillation. path: COMPATIBLE WITH PAPILLARY UROTHELIAL NEOPLASM OF LOW MALIGNANT POTENTIAL (PUNLMP). Dr. Kimberlee Rdz  UROLOGICAL  04/20/2016    Transrectal ultrasound with prostate biopsy and repeat transurethral resection of bladder tumors. path benign. Dr. Kimberlee Rdz  UROLOGICAL  02/24/2016    Cysto, TURBT. path: HIGH-GRADE PAPILLARY UROTHELIAL CARCINOMA. Dr. Bonnie Chen, NEEDLE, SATURATION SAMPLING         Family History:  Family History   Problem Relation Age of Onset    Hypertension Mother     Cancer Father        Social History:  Social History     Tobacco Use    Smoking status: Never Smoker    Smokeless tobacco: Never Used   Substance Use Topics    Alcohol use: Yes     Comment: OCASSIONAL-WINE    Drug use: No       Allergies:  No Known Allergies      Review of Systems       Review of Systems   Eyes: Negative for visual disturbance. Respiratory: Negative for shortness of breath.     Cardiovascular: Negative for chest pain. Neurological: Positive for dizziness. Negative for numbness and headaches. All other systems reviewed and are negative. Physical Exam     Visit Vitals  /81 (BP 1 Location: Left arm, BP Patient Position: At rest)   Pulse 62   Temp 98 °F (36.7 °C)   Resp 16   Wt 86.2 kg (190 lb)   SpO2 93%   BMI 26.50 kg/m²         Physical Exam  Constitutional:       Appearance: He is well-developed. HENT:      Head: Normocephalic and atraumatic. Neck:      Musculoskeletal: Neck supple. Vascular: No JVD. Cardiovascular:      Rate and Rhythm: Normal rate and regular rhythm. Pulmonary:      Effort: Pulmonary effort is normal. No respiratory distress. Breath sounds: Normal breath sounds. Abdominal:      General: There is no distension. Palpations: Abdomen is soft. Tenderness: There is no tenderness. There is no guarding or rebound. Musculoskeletal:      Comments: No joint tenderness   Skin:     General: Skin is warm and dry. Capillary Refill: Capillary refill takes less than 2 seconds. Findings: No erythema. Neurological:      Mental Status: He is alert and oriented to person, place, and time. Comments: No pronator drift, strength out of 5×4, heel-to-shin intact bilaterally, finger-to-nose intact bilaterally, rapid alternating movements intact bilaterally. Gross sensation intact x4   Psychiatric:         Judgment: Judgment normal.           Diagnostic Study Results     Labs -  No results found for this or any previous visit (from the past 12 hour(s)). Radiologic Studies -   No orders to display         Medical Decision Making   I am the first provider for this patient. I reviewed the vital signs, available nursing notes, past medical history, past surgical history, family history and social history. Vital Signs-Reviewed the patient's vital signs.     Pulse Oximetry Analysis -  93 on room air (Interpretation)low normal      EKG: Interpreted by the EP. Time Interpreted: 613   Rate: 58   Rhythm: Normal Sinus Rhythm    Interpretation: Normal axis, sinus bradycardia, no ST changes, normal intervals   Comparison: 9/17/2018, no significant change    Records Reviewed: Nursing Notes, Old Medical Records and Previous electrocardiograms (Time of Review: 6:31 AM)    ED Course: Progress Notes, Reevaluation, and Consults:      Provider Notes (Medical Decision Making): 76-year-old male presents with dizziness and room spinning. Presently without symptoms but neuro exam is unremarkable. History seems most consistent with BPPV so we will treat for such. Check basic labs including EKG to screen for any arrhythmia. 7AM : Pt care transferred to Dr. Kasia Meier  ,ED provider. History of patient complaint(s), available diagnostic reports and current treatment plan has been discussed thoroughly. Bedside rounding on patient occured : yes . Intended disposition of patient : TBD  Pending diagnostics reports and/or labs (please list):         Diagnosis     Clinical Impression:   1. Vertigo        Disposition: TBD    Follow-up Information    None          Patient's Medications   Start Taking    No medications on file   Continue Taking    FINASTERIDE (PROSCAR) 5 MG TABLET    Take 1 Tab by mouth daily. HYDROCODONE-ACETAMINOPHEN (NORCO)  MG TABLET    Take 1 Tab by mouth every six (6) hours as needed for Pain. Max Daily Amount: 4 Tabs. KETOROLAC (ACULAR) 0.5 % OPHTHALMIC SOLUTION        LATANOPROST (XALATAN) 0.005 % OPHTHALMIC SOLUTION    Administer 1 Drop to left eye nightly. LISINOPRIL (PRINIVIL, ZESTRIL) 5 MG TABLET    Take  by mouth daily. OMEPRAZOLE (PRILOSEC) 10 MG CAPSULE    Take 10 mg by mouth daily. SIMVASTATIN (ZOCOR) 10 MG TABLET    Take 20 mg by mouth nightly.     TAMSULOSIN (FLOMAX) 0.4 MG CAPSULE    One cap q d pc   These Medications have changed    No medications on file   Stop Taking    No medications on file _______________________________

## 2020-12-21 ENCOUNTER — TRANSCRIBE ORDER (OUTPATIENT)
Dept: SCHEDULING | Age: 75
End: 2020-12-21

## 2020-12-21 DIAGNOSIS — M47.12 CERVICAL SPONDYLOSIS WITH MYELOPATHY: Primary | ICD-10-CM

## 2020-12-23 ENCOUNTER — HOSPITAL ENCOUNTER (OUTPATIENT)
Age: 75
Discharge: HOME | End: 2020-12-23
Attending: PSYCHIATRY & NEUROLOGY
Payer: MEDICARE

## 2020-12-23 DIAGNOSIS — M47.12 CERVICAL SPONDYLOSIS WITH MYELOPATHY: ICD-10-CM

## 2020-12-23 PROCEDURE — 72141 MRI NECK SPINE W/O DYE: CPT

## 2021-01-11 ENCOUNTER — TELEPHONE (OUTPATIENT)
Dept: ORTHOPEDIC SURGERY | Age: 76
End: 2021-01-11

## 2021-01-11 ENCOUNTER — OFFICE VISIT (OUTPATIENT)
Dept: ORTHOPEDIC SURGERY | Age: 76
End: 2021-01-11
Payer: MEDICARE

## 2021-01-11 VITALS
DIASTOLIC BLOOD PRESSURE: 90 MMHG | SYSTOLIC BLOOD PRESSURE: 155 MMHG | BODY MASS INDEX: 26.99 KG/M2 | WEIGHT: 192.8 LBS | TEMPERATURE: 97.6 F | HEART RATE: 82 BPM | HEIGHT: 71 IN | OXYGEN SATURATION: 94 %

## 2021-01-11 DIAGNOSIS — M62.838 MUSCLE SPASM: ICD-10-CM

## 2021-01-11 DIAGNOSIS — M25.511 CHRONIC PAIN OF BOTH SHOULDERS: ICD-10-CM

## 2021-01-11 DIAGNOSIS — M54.12 CERVICAL RADICULOPATHY: ICD-10-CM

## 2021-01-11 DIAGNOSIS — M25.512 CHRONIC PAIN OF BOTH SHOULDERS: ICD-10-CM

## 2021-01-11 DIAGNOSIS — M48.02 CERVICAL SPINAL STENOSIS: Primary | ICD-10-CM

## 2021-01-11 DIAGNOSIS — G89.29 CHRONIC PAIN OF BOTH SHOULDERS: ICD-10-CM

## 2021-01-11 PROCEDURE — G8536 NO DOC ELDER MAL SCRN: HCPCS | Performed by: PHYSICAL MEDICINE & REHABILITATION

## 2021-01-11 PROCEDURE — 1101F PT FALLS ASSESS-DOCD LE1/YR: CPT | Performed by: PHYSICAL MEDICINE & REHABILITATION

## 2021-01-11 PROCEDURE — 99203 OFFICE O/P NEW LOW 30 MIN: CPT | Performed by: PHYSICAL MEDICINE & REHABILITATION

## 2021-01-11 PROCEDURE — G8419 CALC BMI OUT NRM PARAM NOF/U: HCPCS | Performed by: PHYSICAL MEDICINE & REHABILITATION

## 2021-01-11 PROCEDURE — G8432 DEP SCR NOT DOC, RNG: HCPCS | Performed by: PHYSICAL MEDICINE & REHABILITATION

## 2021-01-11 PROCEDURE — G8427 DOCREV CUR MEDS BY ELIG CLIN: HCPCS | Performed by: PHYSICAL MEDICINE & REHABILITATION

## 2021-01-11 RX ORDER — NAPROXEN 500 MG/1
500 TABLET ORAL 2 TIMES DAILY WITH MEALS
Qty: 60 TAB | Refills: 1 | Status: CANCELLED | OUTPATIENT
Start: 2021-01-11

## 2021-01-11 RX ORDER — GABAPENTIN 300 MG/1
300 CAPSULE ORAL DAILY
COMMUNITY
Start: 2020-12-28 | End: 2021-08-17 | Stop reason: SDUPTHER

## 2021-01-11 NOTE — TELEPHONE ENCOUNTER
Patient was given a follow up for diag review 03/03/21, but this date will not work according to spouse. She also stated the office note said provider wanted him to be seen around 02/15. I explained there was nothing available around that time frame and that is most likely why it was scheduled for March. She stated this will not work. Please advise if March review is ok.       Alli Nelson [phone]

## 2021-01-11 NOTE — PATIENT INSTRUCTIONS
Neck Arthritis: Exercises Introduction Here are some examples of exercises for you to try. The exercises may be suggested for a condition or for rehabilitation. Start each exercise slowly. Ease off the exercises if you start to have pain. You will be told when to start these exercises and which ones will work best for you. How to do the exercises Neck stretches to the side 1. This stretch works best if you keep your shoulder down as you lean away from it. To help you remember to do this, start by relaxing your shoulders and lightly holding on to your thighs or your chair. 2. Tilt your head toward your shoulder and hold for 15 to 30 seconds. Let the weight of your head stretch your muscles. 3. Repeat 2 to 4 times toward each shoulder. Chin tuck 1. Lie on the floor with a rolled-up towel under your neck. Your head should be touching the floor. 2. Slowly bring your chin toward your chest. 
3. Hold for a count of 6, and then relax for up to 10 seconds. 4. Repeat 8 to 12 times. Active cervical rotation 1. Sit in a firm chair, or stand up straight. 2. Keeping your chin level, turn your head to the right, and hold for 15 to 30 seconds. 3. Turn your head to the left and hold for 15 to 30 seconds. 4. Repeat 2 to 4 times to each side. Shoulder blade squeeze 1. While standing, squeeze your shoulder blades together. 2. Do not raise your shoulders up as you are squeezing. 3. Hold for 6 seconds. 4. Repeat 8 to 12 times. Shoulder rolls 1. Sit comfortably with your feet shoulder-width apart. You can also do this exercise standing up. 2. Roll your shoulders up, then back, and then down in a smooth, circular motion. 3. Repeat 2 to 4 times. Follow-up care is a key part of your treatment and safety. Be sure to make and go to all appointments, and call your doctor if you are having problems. It's also a good idea to know your test results and keep a list of the medicines you take. Where can you learn more? Go to http://www.gray.com/ Enter G393 in the search box to learn more about \"Neck Arthritis: Exercises. \" Current as of: March 2, 2020               Content Version: 12.6 © 7574-6719 [employer]. Care instructions adapted under license by [employer] (which disclaims liability or warranty for this information). If you have questions about a medical condition or this instruction, always ask your healthcare professional. [address] any warranty or liability for your use of this information.

## 2021-01-11 NOTE — LETTER
1/12/2021 Patient: Alex Soto YOB: 1945 Date of Visit: 1/11/2021 Radames Boyd MD 
[address] [facility] [address] Via Fax: [phone] Dear Radames Boyd MD, Thank you for referring Mr. Griffin Bird to [address] [place] [address] [facility] for evaluation. My notes for this consultation are attached. If you have questions, please do not hesitate to call me. I look forward to following your patient along with you. Sincerely, Nguyen Marie MD

## 2021-01-12 ENCOUNTER — HOSPITAL ENCOUNTER (OUTPATIENT)
Dept: GENERAL RADIOLOGY | Age: 76
Discharge: HOME | End: 2021-01-12
Payer: MEDICARE

## 2021-01-12 PROCEDURE — 73030 X-RAY EXAM OF SHOULDER: CPT

## 2021-01-15 ENCOUNTER — HOSPITAL ENCOUNTER (OUTPATIENT)
Dept: PHYSICAL THERAPY | Age: 76
Discharge: HOME | End: 2021-01-15
Payer: MEDICARE

## 2021-01-15 PROCEDURE — 97162 PT EVAL MOD COMPLEX 30 MIN: CPT

## 2021-01-15 PROCEDURE — 97110 THERAPEUTIC EXERCISES: CPT

## 2021-01-15 PROCEDURE — 97535 SELF CARE MNGMENT TRAINING: CPT

## 2021-01-15 NOTE — PROGRESS NOTES
[facility] [place], [address] [place] [address]  [phone] fax  Plan of Care/ Statement of Necessity for Physical Therapy Services     Patient name: Macy Mcdonald Start of Care: 1/15/2021   Referral source: John Guerrero MD : 1945    Medical Diagnosis: Neck pain [M54.2]  Right shoulder pain [M25.511]  Left shoulder pain [M25.512]  Payor: [place] MEDICARE / Plan: [place] MEDICARE PART A & B / Product Type: Medicare /  Onset Date: 2020    Treatment Diagnosis: B shoulder pain   Prior Hospitalization: see medical history Provider#: [id number]   Medications: Verified on Patient summary List    Comorbidities:  HTN, hx cancer   Prior Level of Function: Independent with ADLs, functional and household activities with no limitations. The Plan of Care and following information is based on the information from the initial evaluation. Assessment/ key information:   Pt is a 67year old male who presents to therapy today with B shoulder pain. Pt states that his symptoms began on 2020 after overextending/overusing his arms when replacing siding on his house. Pt reports having increased pain/weakness in his left>right UE along with difficulty reaching and dressing. Pt demonstrated decreased AROM, decreased strength, impaired posture, muscle tightness and tenderness. Positive Hawkin's Rafael test noted on the left UE. C/s AROM did not increase B UE symptoms. Pt also reports noticing increased swelling in B LEs and educated pt to monitor this and to mention this to his MD if this worsens. Pt would benefit from physical therapy to improve the above impairments to help the pt return to performing ADLs, functional and recreational activities.      Evaluation Complexity History MEDIUM  Complexity : 1-2 comorbidities / personal factors will impact the outcome/ POC ; Examination HIGH Complexity : 4+ Standardized tests and measures addressing body structure, function, activity limitation and / or participation in recreation  ;Presentation MEDIUM Complexity : Evolving with changing characteristics  ; Clinical Decision Making MEDIUM Complexity : FOTO score of 26-74  Overall Complexity Rating: MEDIUM  Problem List: pain affecting function, decrease ROM, decrease strength, edema affecting function, decrease ADL/ functional abilitiies, decrease activity tolerance, decrease flexibility/ joint mobility and decrease transfer abilities   Treatment Plan may include any combination of the following: Therapeutic exercise, Therapeutic activities, Neuromuscular re-education, Physical agent/modality, Manual therapy, Patient education, Self Care training, Functional mobility training and Home safety training  Patient / Family readiness to learn indicated by: asking questions, trying to perform skills and interest  Persons(s) to be included in education: patient (P)  Barriers to Learning/Limitations: None  Patient Goal (s): return to normal use  Patient Self Reported Health Status: good  Rehabilitation Potential: good    Short Term Goals: To be accomplished in 2 treatments:  1. Pt will report compliance and independence to [facility] to help the pt manage their pain and symptoms. Eval: established   Long Term Goals: To be accomplished in 10 treatments:  1. Pt will increase FOTO score to 73 points to improve ability to perform ADLs. Eval: 63 points  2. Pt will increase AROM left shoulder flex and ABD to 100 degs in sitting to improve ability to perform household activities. Eval: flex 78 degs, ABD 64 degs (both with pain in anterior shoulder and upper UE)  3. Pt will increase AROM right shoulder flex to 150 degs to improve ability to reach into a kitchen cabinet with less pain. Eval: 132 degs with discomfort.   4. Pt will report being able to return to yard work and house work activities with minimal to no increased pain in B shoulders to improve independence with household tasks.   Eval: cannot do full yard and house work activities because of pain and limited B shoulders. Frequency / Duration: Patient to be seen 2 times per week for 10 treatments. Patient/ Caregiver education and instruction: Diagnosis, prognosis, self care, activity modification and exercises   [x]  Plan of care has been reviewed with PTA    Certification Period: 1/15/2021 - 2/13/2021  Sonal Arnett, PT 1/15/2021 10:59 AM  _____________________________________________________________________  I certify that the above Therapy Services are being furnished while the patient is under my care. I agree with the treatment plan and certify that this therapy is necessary.     Physician's Signature:____________Date:_________TIME:________    ** Signature, Date and Time must be completed for valid certification **    Please sign and return to [facility], [address] [place] [address]  [phone] fax

## 2021-01-15 NOTE — PROGRESS NOTES
PT DAILY TREATMENT NOTE  10-18    Patient Name: Juan Carlos Gomes  Date:1/15/2021  : 1945  [x]  Patient  Verified  Payor: VA MEDICARE / Plan: VA MEDICARE PART A & B / Product Type: Medicare /    In time:10:39  Out time:11:20  Total Treatment Time (min): 41  Visit #: 1 of 10    Medicare/BCBS Only   Total Timed Codes (min):  23 1:1 Treatment Time:  41     Treatment Area: Neck pain [M54.2]  Right shoulder pain [M25.511]  Left shoulder pain [M25.512]    SUBJECTIVE  Pain Level (0-10 scale): 1  Any medication changes, allergies to medications, adverse drug reactions, diagnosis change, or new procedure performed?: [x] No    [] Yes (see summary sheet for update)  Subjective functional status/changes:   [] No changes reported  See POC    OBJECTIVE    18 min [x]Eval                  []Re-Eval     13 min Therapeutic Exercise:  [] See flow sheet : HEP instruction and demonstration   Rationale: increase ROM and increase strength to improve the patients ability to tolerate ADLs    10 min Self Care/Home Management: []  See flow sheet : pt education regarding anatomy and physiology of the spine/UEs and how it relates to the pt's condition, pt education on avoiding activities that increase his B shoulder pain/symptoms, pt education on monitoring his B LE swelling and to mention this to his MD if it worsens. Rationale: increase ROM, increase strength and decrease pain/symptoms  to improve the patients ability to tolerate functional tasks. With   [x] TE   [] TA   [] neuro   [x] Other: Self Care/Home management Patient Education: [x] Review HEP    [] Progressed/Changed HEP based on:   [] positioning   [] body mechanics   [] transfers   [] heat/ice application    [] other:      Other Objective/Functional Measures: See evaluation. Pain Level (0-10 scale) post treatment: 0    ASSESSMENT/Changes in Function: Pt given HEP handout to perform. Pt understood exercises in HEP handout.  Pt demonstrated decreased AROM, decreased strength, impaired posture, muscle tightness and tenderness. Positive Hawkin's Rafael test noted on the left UE. C/s AROM did not increase B UE symptoms. Pt also reports noticing increased swelling in B LEs and educated pt to monitor this and to mention this to his MD if this worsens. Pt would benefit from physical therapy to improve the above impairments to help the pt return to performing ADLs, functional and recreational activities.     Patient will continue to benefit from skilled PT services to modify and progress therapeutic interventions, address functional mobility deficits, address ROM deficits, address strength deficits, analyze and address soft tissue restrictions, analyze and cue movement patterns, analyze and modify body mechanics/ergonomics, assess and modify postural abnormalities and instruct in home and community integration to attain remaining goals.     [x]  See Plan of Care  []  See progress note/recertification  []  See Discharge Summary         Progress towards goals / Updated goals:  Short Term Goals: To be accomplished in 2 treatments:  1. Pt will report compliance and independence to [facility] to help the pt manage their pain and symptoms.   Eval: established   Long Term Goals: To be accomplished in 10 treatments:  1. Pt will increase FOTO score to 73 points to improve ability to perform ADLs.  Eval: 63 points  2. Pt will increase AROM left shoulder flex and ABD to 100 degs in sitting to improve ability to perform household activities.   Eval: flex 78 degs, ABD 64 degs (both with pain in anterior shoulder and upper UE)  3. Pt will increase AROM right shoulder flex to 150 degs to improve ability to reach into a kitchen cabinet with less pain.  Eval: 132 degs with discomfort.  4. Pt will report being able to return to yard work and house work activities with minimal to no increased pain in B shoulders to improve independence with household tasks.   Eval: cannot do full yard and house work  activities because of pain and limited B shoulders.      PLAN  [x]  Upgrade activities as tolerated     [x]  Continue plan of care  [x]  Update interventions per flow sheet       []  Discharge due to:_  []  Other:_      Loraine Hardwick, PT 1/15/2021  10:59 AM    Future Appointments   Date Time Provider Greyson Moore   2/16/2021  1:15 PM [facility]   6/10/2021 10:45 AM Paulette Regan MD [id number] [facility]

## 2021-01-18 ENCOUNTER — HOSPITAL ENCOUNTER (OUTPATIENT)
Dept: PHYSICAL THERAPY | Age: 76
Discharge: HOME | End: 2021-01-18
Payer: MEDICARE

## 2021-01-18 PROCEDURE — 97110 THERAPEUTIC EXERCISES: CPT

## 2021-01-18 PROCEDURE — 97112 NEUROMUSCULAR REEDUCATION: CPT

## 2021-01-18 NOTE — PROGRESS NOTES
PT DAILY TREATMENT NOTE     Patient Name: Comer Goltz  Date:2021  : 1945  [x]  Patient  Verified  Payor: [place] MEDICARE / Plan: [place] MEDICARE PART A & B / Product Type: Medicare /    In time:1115  Out time:1200  Total Treatment Time (min): 45  Visit #: 2 of 10    Medicare/BCBS Only   Total Timed Codes (min):  45 1:1 Treatment Time:  45       Treatment Area: Neck pain [M54.2]  Right shoulder pain [M25.511]  Left shoulder pain [M25.512]    SUBJECTIVE  Pain Level (0-10 scale): 0  Any medication changes, allergies to medications, adverse drug reactions, diagnosis change, or new procedure performed?: [x] No    [] Yes (see summary sheet for update)  Subjective functional status/changes:   [] No changes reported  Patient reports his pain is worse when he wakes up in the morning but gets better as he gets it moving. OBJECTIVE      30 min Therapeutic Exercise:  [] See flow sheet :   Rationale: increase ROM and increase strength to improve the patients ability to increase activity tolerance     15 min Neuromuscular Re-education:  []  See flow sheet : scapular stabilization   Rationale: increase strength, improve coordination, improve balance and increase proprioception  to improve the patients ability to tolerate sustained postures          With   [x] TE   [] TA   [x] neuro   [] other: Patient Education: [x] Review HEP    [] Progressed/Changed HEP based on:   [x] positioning   [x] body mechanics   [] transfers   [] heat/ice application    [] other:      Other Objective/Functional Measures: limited ROM with S/L abd on left    Pain Level (0-10 scale) post treatment: 0    ASSESSMENT/Changes in Function: Initiated exercises per flow sheet without increase of symptoms. Patient has some difficulty with lying on left side due to shoulder pain but was able to perform exercises. He had the most difficulty and discomfort with S/L abd on left.  He required cuing for body mechanics and UT compensation with rows and ext. Progress resistance and challenge of exercises NV as tolerated. Patient will continue to benefit from skilled PT services to modify and progress therapeutic interventions, address functional mobility deficits, address ROM deficits, address strength deficits, analyze and address soft tissue restrictions, analyze and cue movement patterns, analyze and modify body mechanics/ergonomics, assess and modify postural abnormalities, address imbalance/dizziness and instruct in home and community integration to attain remaining goals. []  See Plan of Care  []  See progress note/recertification  []  See Discharge Summary         Progress towards goals / Updated goals:  Short Term Goals: To be accomplished in 2 treatments:  1. Pt will report compliance and independence to [facility] to help the pt manage their pain and symptoms. Eval: established  Current: patient reports will start today 1/18/21   Long Term Goals: To be accomplished in 10 treatments:  1. Pt will increase FOTO score to 73 points to improve ability to perform ADLs. Eval: 63 points  2. Pt will increase AROM left shoulder flex and ABD to 100 degs in sitting to improve ability to perform household activities. Eval: flex 78 degs, ABD 64 degs (both with pain in anterior shoulder and upper UE)  3. Pt will increase AROM right shoulder flex to 150 degs to improve ability to reach into a kitchen cabinet with less pain. Eval: 132 degs with discomfort. 4. Pt will report being able to return to yard work and house work activities with minimal to no increased pain in B shoulders to improve independence with household tasks. Eval: cannot do full yard and house work activities because of pain and limited B shoulders.      PLAN  [x]  Upgrade activities as tolerated     [x]  Continue plan of care  []  Update interventions per flow sheet       []  Discharge due to:_  []  Other:_      Katie Rod PTA 1/18/2021  11:25 AM    Future Appointments   Date Time Provider Greyson Moore   1/21/2021 11:15 AM Hill Lizarraga, [facility]   1/26/2021 12:00 PM Hill Lizarraga, [facility]   1/28/2021 12:00 PM Hill Lizarraga, [facility]   2/2/2021 11:15 AM [facility]   2/4/2021 11:15 AM [facility]   2/9/2021 11:15 AM [facility]   2/11/2021 11:15 AM [facility]   2/16/2021 11:15 AM Hill Lizarraga, [facility]   2/16/2021  1:15 PM Tatianna Michelle MD [facility] BS AMB   6/10/2021 10:45 AM Yu Bridges MD [facility] [facility]

## 2021-01-21 ENCOUNTER — HOSPITAL ENCOUNTER (OUTPATIENT)
Dept: PHYSICAL THERAPY | Age: 76
Discharge: HOME | End: 2021-01-21
Payer: MEDICARE

## 2021-01-21 PROCEDURE — 97110 THERAPEUTIC EXERCISES: CPT

## 2021-01-21 PROCEDURE — 97112 NEUROMUSCULAR REEDUCATION: CPT

## 2021-01-21 NOTE — PROGRESS NOTES
PT DAILY TREATMENT NOTE     Patient Name: Ioana Gunn  Date:2021  : 1945  [x]  Patient  Verified  Payor: [place] MEDICARE / Plan: [place] MEDICARE PART A & B / Product Type: Medicare /    In time:11:15  Out time:12:00  Total Treatment Time (min): 45  Visit #: 3 of 10    Medicare/BCBS Only   Total Timed Codes (min):  45 1:1 Treatment Time:  45       Treatment Area: Neck pain [M54.2]  Right shoulder pain [M25.511]  Left shoulder pain [M25.512]    SUBJECTIVE  Pain Level (0-10 scale): 3-4/10  Any medication changes, allergies to medications, adverse drug reactions, diagnosis change, or new procedure performed?: [x] No    [] Yes (see summary sheet for update)  Subjective functional status/changes:   [] No changes reported  \"I was doing the home exercises and it caused a little burn and discomfort in the shoulders, especially that stretch in the doorway. \"    OBJECTIVE    25 min Therapeutic Exercise:  [] See flow sheet :   Rationale: increase ROM and increase strength to improve the patients ability to increase activity tolerance     20 min Neuromuscular Re-education:  []  See flow sheet : scapular stabilization   Rationale: increase strength, improve coordination, improve balance and increase proprioception  to improve the patients ability to tolerate sustained postures          With   [x] TE   [] TA   [x] neuro   [] other: Patient Education: [x] Review HEP    [] Progressed/Changed HEP based on:   [x] positioning   [x] body mechanics   [] transfers   [] heat/ice application    [] other:      Other Objective/Functional Measures: Continued with treatment program per flow sheet. Pt given verbal cueing for proper technique of all exercises. Pain Level (0-10 scale) post treatment: 1/10    ASSESSMENT/Changes in Function: Pt exhibited less difficulty with S/L exercises today. Progressed program for shoulder and scapular stabilization.   Pt given verbal cueing for reducing upper trapezius/levator scapulae compensation. Pt advised to reduce lean for doorway stretch to reduce pain into left anterior shoulder when performing. Pt reported reduction in overall pain levels after session and declined modalities. Patient will continue to benefit from skilled PT services to modify and progress therapeutic interventions, address functional mobility deficits, address ROM deficits, address strength deficits, analyze and address soft tissue restrictions, analyze and cue movement patterns, analyze and modify body mechanics/ergonomics, assess and modify postural abnormalities, address imbalance/dizziness and instruct in home and community integration to attain remaining goals. []  See Plan of Care  []  See progress note/recertification  []  See Discharge Summary         Progress towards goals / Updated goals:  Short Term Goals: To be accomplished in 2 treatments:  1. Pt will report compliance and independence to HEP to help the pt manage their pain and symptoms. Eval: established  Current status: met - Pt reports compliance with HEP  Long Term Goals: To be accomplished in 10 treatments:  1. Pt will increase FOTO score to 73 points to improve ability to perform ADLs. Eval: 63 points  Current status: reassess at MD note  2. Pt will increase AROM left shoulder flex and ABD to 100 degs in sitting to improve ability to perform household activities. Eval: flex 78 degs, ABD 64 degs (both with pain in anterior shoulder and upper UE)  Current status:  3. Pt will increase AROM right shoulder flex to 150 degs to improve ability to reach into a kitchen cabinet with less pain. Eval: 132 degs with discomfort. Current status:  4. Pt will report being able to return to yard work and house work activities with minimal to no increased pain in B shoulders to improve independence with household tasks. Eval: cannot do full yard and house work activities because of pain and limited B shoulders.   Current status:     PLAN  [x] Upgrade activities as tolerated     [x]  Continue plan of care  []  Update interventions per flow sheet       []  Discharge due to:_  []  Other:_      Mauricio Lizarraga, PT 1/21/2021  11:25 AM    Future Appointments   Date Time Provider Greyson Moore   1/26/2021 12:00 PM Mauricio Lizarraga, [facility]   1/28/2021 12:00 PM Mauricio Lizarraga, [facility]   2/2/2021 11:15 AM [facility]   2/4/2021 11:15 AM [facility]   2/9/2021 11:15 AM [facility]   2/11/2021 11:15 AM [facility]   2/16/2021 11:15 AM Mauricio Lizarraga, [facility]   2/16/2021  1:15 PM Angy Rodriguez MD [facility] BS AMB   6/10/2021 10:45 AM Jonathan Powell MD [address] [facility]

## 2021-01-26 ENCOUNTER — HOSPITAL ENCOUNTER (OUTPATIENT)
Dept: PHYSICAL THERAPY | Age: 76
Discharge: HOME | End: 2021-01-26
Payer: MEDICARE

## 2021-01-26 PROCEDURE — 97112 NEUROMUSCULAR REEDUCATION: CPT

## 2021-01-26 PROCEDURE — 97110 THERAPEUTIC EXERCISES: CPT

## 2021-01-26 NOTE — PROGRESS NOTES
PT DAILY TREATMENT NOTE     Patient Name: Garry Keita  Date:2021  : 1945  [x]  Patient  Verified  Payor: VA MEDICARE / Plan: VA MEDICARE PART A & B / Product Type: Medicare /    In time:12:01  Out time:12:45  Total Treatment Time (min): 44  Visit #: 4 of 10    Medicare/BCBS Only   Total Timed Codes (min):  44 1:1 Treatment Time:  44       Treatment Area: Neck pain [M54.2]  Right shoulder pain [M25.511]  Left shoulder pain [M25.512]    SUBJECTIVE  Pain Level (0-10 scale): 2-3/10  Any medication changes, allergies to medications, adverse drug reactions, diagnosis change, or new procedure performed?: [x] No    [] Yes (see summary sheet for update)  Subjective functional status/changes:   [] No changes reported  \"I feel okay. I find that it takes me 24 hours to get over it if I do that stretch in the doorway. \"    OBJECTIVE    24 min Therapeutic Exercise:  [] See flow sheet :   Rationale: increase ROM and increase strength to improve the patients ability to increase activity tolerance     20 min Neuromuscular Re-education:  []  See flow sheet : scapular stabilization   Rationale: increase strength, improve coordination, improve balance and increase proprioception  to improve the patients ability to tolerate sustained postures          With   [] TE   [] TA   [x] neuro   [] other: Patient Education: [x] Review HEP    [] Progressed/Changed HEP based on:   [x] positioning   [x] body mechanics   [] transfers   [] heat/ice application    [] other:      Other Objective/Functional Measures: Performed exercises per flow sheet. Reassessed goals. Pain Level (0-10 scale) post treatment: 1/10    ASSESSMENT/Changes in Function: Pt advised to cease performing doorway stretch as its causing more discomfort than benefit. Pt has made significant improvements in B shoulder AROM in Flex/ABD with reduced pain - see measurements below.   Pt reported understanding of stopping doorway stretch and advised to continue with other home exercises. Will continue to progress program per Pt tolerance to improve functional capabilities with less pain. Patient will continue to benefit from skilled PT services to modify and progress therapeutic interventions, address functional mobility deficits, address ROM deficits, address strength deficits, analyze and address soft tissue restrictions, analyze and cue movement patterns, analyze and modify body mechanics/ergonomics, assess and modify postural abnormalities, address imbalance/dizziness and instruct in home and community integration to attain remaining goals. []  See Plan of Care  []  See progress note/recertification  []  See Discharge Summary         Progress towards goals / Updated goals:  Short Term Goals: To be accomplished in 2 treatments:  1. Pt will report compliance and independence to HEP to help the pt manage their pain and symptoms. Eval: established  Current status: met - Pt reports compliance with HEP  Long Term Goals: To be accomplished in 10 treatments:  1. Pt will increase FOTO score to 73 points to improve ability to perform ADLs. Eval: 63 points  Current status: reassess at MD note  2. Pt will increase AROM left shoulder flex and ABD to 100 degs in sitting to improve ability to perform household activities. Eval: flex 78 degs, ABD 64 degs (both with pain in anterior shoulder and upper UE)  Current status: met - Flex 140 deg, Scaption 143 deg  3. Pt will increase AROM right shoulder flex to 150 degs to improve ability to reach into a kitchen cabinet with less pain. Eval: 132 degs with discomfort. Current status: progressing - Flex 145 deg, Scaption 155 deg  4. Pt will report being able to return to yard work and house work activities with minimal to no increased pain in B shoulders to improve independence with household tasks. Eval: cannot do full yard and house work activities because of pain and limited B shoulders.   Current status: reassess closer to MD note    PLAN  [x]  Upgrade activities as tolerated     [x]  Continue plan of care  []  Update interventions per flow sheet       []  Discharge due to:_  []  Other:_      Lainey Lizarraga, PT 1/26/2021  11:25 AM    Future Appointments   Date Time Provider Greyson Moore   1/28/2021 12:00 PM Lainey Lizarraga, [facility]   2/2/2021 11:15 AM [facility]   2/4/2021 11:15 AM [facility]   2/9/2021 11:15 AM [facility]   2/11/2021 11:15 AM [facility]   2/16/2021 11:15 AM Lainey Lizarraga, [facility]   2/16/2021  1:15 PM Bret Blank MD [facility] BS AMB   6/10/2021 10:45 AM Kortney Howard MD [place] [id number]

## 2021-01-27 ENCOUNTER — HOSPITAL ENCOUNTER (OUTPATIENT)
Dept: LAB | Age: 76
Discharge: HOME | End: 2021-01-27
Payer: MEDICARE

## 2021-01-27 ENCOUNTER — TELEPHONE (OUTPATIENT)
Dept: ORTHOPEDIC SURGERY | Age: 76
End: 2021-01-27

## 2021-01-27 DIAGNOSIS — M62.81 MUSCLE WEAKNESS (GENERALIZED): Primary | ICD-10-CM

## 2021-01-27 DIAGNOSIS — M62.81 MUSCLE WEAKNESS (GENERALIZED): ICD-10-CM

## 2021-01-27 LAB
CRP SERPL-MCNC: 2.2 MG/DL (ref 0–0.3)
ERYTHROCYTE [SEDIMENTATION RATE] IN BLOOD: 27 MM/HR (ref 0–20)

## 2021-01-27 PROCEDURE — 85652 RBC SED RATE AUTOMATED: CPT

## 2021-01-27 PROCEDURE — 36415 COLL VENOUS BLD VENIPUNCTURE: CPT

## 2021-01-27 PROCEDURE — 86140 C-REACTIVE PROTEIN: CPT

## 2021-01-27 NOTE — TELEPHONE ENCOUNTER
I called and spoke to Mrs. Laureano. The lab orders were put in per the doctor's verbal order. The pt was identified using 2 pt identifiers. I asked Mrs. Laureano where they would like to get the labs done. She states that they wanted to go to [facility]. She was made aware that the orders have been placed and will be in the system when they go. She was instructed to contact the office if there are any issues. An apology was issued for the delay. No other requests from the pt at this time.

## 2021-01-27 NOTE — TELEPHONE ENCOUNTER
PT SPOUSE ON [employer]IP[employer] CALLED STATES SHE SPOKE WITH DR Munira Vásquez AT LENGTH ON MONDAY AND WAS TOLD LABS WOULD BE ORDERED. SHE COMMENTED SOMEONE FROM THE OFFICE TOLD HER LABS WERE ORDERED AND IN CHART & THEY WENT TO [place] AND THERE ARE NO ORDERS. SPOUSE IS VERY FRUSTRATED.     PLEASE CONTACT THE PATIENT/OR SPOUSE MS. Dana Pooja -[phone]

## 2021-01-28 ENCOUNTER — HOSPITAL ENCOUNTER (OUTPATIENT)
Dept: PHYSICAL THERAPY | Age: 76
Discharge: HOME | End: 2021-01-28
Payer: MEDICARE

## 2021-01-28 PROCEDURE — 97110 THERAPEUTIC EXERCISES: CPT

## 2021-01-28 PROCEDURE — 97112 NEUROMUSCULAR REEDUCATION: CPT

## 2021-01-28 NOTE — PROGRESS NOTES
PT DAILY TREATMENT NOTE     Patient Name: Alex Soto  Date:2021  : 1945  [x]  Patient  Verified  Payor: VA MEDICARE / Plan: VA MEDICARE PART A & B / Product Type: Medicare /    In time:12:05  Out time:12:43  Total Treatment Time (min): 38  Visit #: 5 of 10    Medicare/BCBS Only   Total Timed Codes (min):  38 1:1 Treatment Time:  38       Treatment Area: Neck pain [M54.2]  Right shoulder pain [M25.511]  Left shoulder pain [M25.512]    SUBJECTIVE  Pain Level (0-10 scale): 2/10  Any medication changes, allergies to medications, adverse drug reactions, diagnosis change, or new procedure performed?: [x] No    [] Yes (see summary sheet for update)  Subjective functional status/changes:   [] No changes reported  \"I feel okay, just a little tightness. \"    OBJECTIVE    23 min Therapeutic Exercise:  [] See flow sheet :   Rationale: increase ROM and increase strength to improve the patients ability to increase activity tolerance     15 min Neuromuscular Re-education:  []  See flow sheet :    Rationale: increase strength, improve coordination, improve balance and increase proprioception  to improve the patients ability to tolerate sustained postures          With   [] TE   [] TA   [x] neuro   [] other: Patient Education: [x] Review HEP    [] Progressed/Changed HEP based on:   [x] positioning   [x] body mechanics   [] transfers   [] heat/ice application    [] other:      Other Objective/Functional Measures: Progressed exercises per flow sheet. No walkouts with  ER/IR with Tband. Pain Level (0-10 scale) post treatment: 0/10    ASSESSMENT/Changes in Function: Pt reported trying doorway stretch again and having more pain but wanted to keep trying if it would help. Pt advised again to stop doorway stretch as its causing more pain. Focused on more AROM and strengthening today as Pt making good gains in B shoulder AROM but still having difficulty raising left UE and performing overhead tasks.   Pt had no increase in pain with progressions. Patient will continue to benefit from skilled PT services to modify and progress therapeutic interventions, address functional mobility deficits, address ROM deficits, address strength deficits, analyze and address soft tissue restrictions, analyze and cue movement patterns, analyze and modify body mechanics/ergonomics, assess and modify postural abnormalities, address imbalance/dizziness and instruct in home and community integration to attain remaining goals. []  See Plan of Care  []  See progress note/recertification  []  See Discharge Summary         Progress towards goals / Updated goals:  Short Term Goals: To be accomplished in 2 treatments:  1. Pt will report compliance and independence to HEP to help the pt manage their pain and symptoms. Eval: established  Current status: met - Pt reports compliance with HEP  Long Term Goals: To be accomplished in 10 treatments:  1. Pt will increase FOTO score to 73 points to improve ability to perform ADLs. Eval: 63 points  Current status: reassess at MD note  2. Pt will increase AROM left shoulder flex and ABD to 100 degs in sitting to improve ability to perform household activities. Eval: flex 78 degs, ABD 64 degs (both with pain in anterior shoulder and upper UE)  Current status: met - Flex 140 deg, Scaption 143 deg  3. Pt will increase AROM right shoulder flex to 150 degs to improve ability to reach into a kitchen cabinet with less pain. Eval: 132 degs with discomfort. Current status: progressing - Flex 145 deg, Scaption 155 deg  4. Pt will report being able to return to yard work and house work activities with minimal to no increased pain in B shoulders to improve independence with household tasks. Eval: cannot do full yard and house work activities because of pain and limited B shoulders.   Current status: reassess closer to MD note    PLAN  [x]  Upgrade activities as tolerated     [x]  Continue plan of care  []  Update interventions per flow sheet       []  Discharge due to:_  []  Other:_      Mayela Lizarraga, PT 1/28/2021  11:25 AM    Future Appointments   Date Time Provider Greyson Moore   2/1/2021 10:05 AM Melanie Mendoza MD [facility] BS AMB   2/2/2021 11:15 AM Ruthell Levee [facility]   2/4/2021 11:15 AM Ruthell Levee [facility]   2/9/2021 11:15 AM Ruthell Levee [facility]   2/11/2021 11:15 AM Ruthell Levee [facility]   2/16/2021 11:15 AM Mayela Lizarraga, PT [facility]   2/16/2021  1:15 PM Melanie Mendoza MD [facility] BS AMB   6/10/2021 10:45 AM Ursula Rangel MD [address] [facility]

## 2021-01-28 NOTE — PROGRESS NOTES
[facility]  Aysha Good[address] [place] [address], [facility] [address]  Phone: [phone]  Fax: [phone]    Pt's YOB: 1945    ASSESSMENT   Diagnoses and all orders for this visit:    1. Cervical spinal stenosis    2. Cervical radiculopathy    3. Proximal muscle weakness  -     predniSONE (DELTASONE) 10 mg tablet; Take 2 tabs by mouth in the morning with food for 2 weeks then decrease to 1 tab in the morning as directed. 4. Elevated C-reactive protein (CRP)  -     predniSONE (DELTASONE) 10 mg tablet; Take 2 tabs by mouth in the morning with food for 2 weeks then decrease to 1 tab in the morning as directed. 5. Chronic pain of both shoulders    6. Muscle spasm    7. Primary osteoarthritis of shoulders, bilateral         IMPRESSION AND PLAN:  Wilian Jerry is a 67 y.o. right male with history of cervical and bilateral shoulder pain. He complains of aching pain, stiffness, and limited range of motion in both shoulders, L>R, particularly with overhead motion. Pt decreased his Neurontin 300 mg to 1 cap QAM, 1 cap in the afternoon, and 3 caps QHS. Labs from 1/27/2021 were reviewed and demonstrated a C-reactive protein of 2.2 mg/dL (reference range 0-0.3) and sed rate of 27 mm/hr (reference range of 0-20). 1) Pt was given information on cervical and shoulder arthritis exercises. 2) He was prescribed prednisone 10 mg 2 tabs daily for 2 weeks then decrease to 1 tab daily -- concern for possible PMR; will reassess his pain and weakness at his follow up visit. 3) Pt will continue tapering the dosage his Neurontin 300 mg as directed and does not need a refill at this time -- if his neuropathic symptoms worsen, he may continue the medication at the effective dosage-- concern for recent episode of increased peripheral edema with elevated dosage  4) He will continue with physical therapy for his shoulders and also using the Voltaren gel as needed.    5)  [place] has a reminder for a \"due or due soon\" health maintenance. I have asked that he contact his primary care provider, Kedric Lombard, MD, for follow-up on this health maintenance. 6)  demonstrated consistency with prescribing. Follow-up and Dispositions    · Return in about 15 days (around 2/16/2021) for PT follow up, Medication follow up. HISTORY OF PRESENT ILLNESS:  Carmen Christina is a 67 y.o. right male with history of cervical and bilateral shoulder pain and presents to the office today for follow up. He complains of pain in the upper back that radiates down both arms to the elbows, L>R. Pt also complains of aching pain, stiffness, and limited range of motion in both shoulders, L>R. He admits that he generally sleeps for 2 hours of time before waking up with shoulder pain. Pt reports occasional difficulty transitioning from sit to stand and aching pain in the arms, particularly in the morning with overhead motion. He notes that he started to notice swelling in the feet and was started on Lasix 1 week ago. Pt decreased his Neurontin 300 mg to 1 cap QAM, 1 cap in the afternoon, and 3 caps QHS. He reports significant relief when taking prednisone. Labs from 1/27/2021 were reviewed and demonstrated a C-reactive protein of 2.2 mg/dL (reference range 0-0.3) and sed rate of 27 mm/hr (reference range of 0-20). He is currently enrolled in physical therapy with benefit. Pt uses Voltaren gel on his shoulders TID. Pt at this time desires to proceed with medication evaluation.     Pain Scale: 2/10    PCP: Kedric Lombard, MD     Past Medical History:   Diagnosis Date    Bladder cancer ([facility] [address] 02/24/2016    Elevated prostate specific antigen (PSA)     Essential hypertension     Hiatal hernia     Nodular prostate         Social History     Socioeconomic History    Marital status:      Spouse name: Not on file    Number of children: Not on file    Years of education: Not on file  Highest education level: Not on file   Occupational History    Not on file   Social Needs    Financial resource strain: Not on file    Food insecurity     Worry: Not on file     Inability: Not on file    Transportation needs     Medical: Not on file     Non-medical: Not on file   Tobacco Use    Smoking status: Never Smoker    Smokeless tobacco: Never Used   Substance and Sexual Activity    Alcohol use: Yes     Comment: OCASSIONAL-WINE    Drug use: No    Sexual activity: Yes   Lifestyle    Physical activity     Days per week: Not on file     Minutes per session: Not on file    Stress: Not on file   Relationships    Social connections     Talks on phone: Not on file     Gets together: Not on file     Attends Religion service: Not on file     Active member of club or organization: Not on file     Attends meetings of clubs or organizations: Not on file     Relationship status: Not on file    Intimate partner violence     Fear of current or ex partner: Not on file     Emotionally abused: Not on file     Physically abused: Not on file     Forced sexual activity: Not on file   Other Topics Concern    Not on file   Social History Narrative    Not on file       Current Outpatient Medications   Medication Sig Dispense Refill    predniSONE (DELTASONE) 10 mg tablet Take 2 tabs by mouth in the morning with food for 2 weeks then decrease to 1 tab in the morning as directed. 44 Tab 0    finasteride (Proscar) 5 mg tablet Take 1 Tab by mouth daily. 90 Tab 2    gabapentin (NEURONTIN) 300 mg capsule       meclizine (ANTIVERT) 25 mg tablet Take 1 tablet by mouth every 6 hours for the next 3 days. Then stop taking the meclizine. Restart the meclizine for 3 day intervals if vertigo/ dizziness returns. 20 Tab 0    ketorolac (ACULAR) 0.5 % ophthalmic solution       omeprazole (PRILOSEC) 10 mg capsule Take 10 mg by mouth daily.  lisinopril (PRINIVIL, ZESTRIL) 5 mg tablet Take  by mouth daily.       simvastatin (ZOCOR) 20 mg tablet       tamsulosin (FLOMAX) 0.4 mg capsule One cap q d pc (Patient not taking: Reported on 1/11/2021) 90 Cap 3    latanoprost (XALATAN) 0.005 % ophthalmic solution Administer 1 Drop to left eye nightly. No Known Allergies      REVIEW OF SYSTEMS    Constitutional: Negative for fever, chills, or weight change. Respiratory: Negative for cough or shortness of breath. Cardiovascular: Negative for chest pain or palpitations. Gastrointestinal: Negative for acid reflux, change in bowel habits, or constipation. Genitourinary: Negative for dysuria and flank pain. Musculoskeletal: Positive for cervical and shoulder pain. Skin: Negative for rash. Neurological: Negative for headaches or dizziness. Positive for numbness. Endo/Heme/Allergies: Negative for increased bruising. Psychiatric/Behavioral: Positive for difficulty with sleep. As per HPI    PHYSICAL EXAMINATION  Visit Vitals  BP (!) 152/79 (BP 1 Location: Left upper arm, BP Patient Position: Sitting)   Pulse 75   Temp 97.6 °F (36.4 °C) (Skin)   Ht 5' 11\" (1.803 m)   Wt 198 lb 6.4 oz (90 kg)   SpO2 97%   BMI 27.67 kg/m²       Constitutional: Awake, alert, and in no acute distress. Neurological: 1+ symmetrical DTRs in the upper extremities. 1+ symmetrical DTRs in the lower extremities. Sensation to light touch is intact. Negative Trimble's sign bilaterally. Skin: warm, dry, and intact. Musculoskeletal: Tight across the upper trapezius bilaterally, L>R. Pain with abduction of the left shoulder to about 80 degrees; abduction on the let to 90 degrees. Good range of motion with overhead motion,. Positive impingment sign on the left. No tenderness to palpation in the lumbar region. No pain with lumbar extension, axial loading, or forward flexion. Mild decreased range of motion with internal rotation of his left hip.  Negative straight leg raise bilaterally; some mild difficulty transitioning sit to stand      Biceps Triceps Deltoids Wrist Ext Wrist Flex Hand Intrin   Right +4/5 +4/5 +4/5 +4/5 +4/5 +4/5   Left +4/5 +4/5 +4/5 +4/5 +4/5 +4/5      Hip Flex  Quads Hamstrings Ankle DF EHL Ankle PF   Right +4/5 +4/5 +4/5 +4/5 +4/5 +4/5   Left +4/5 +4/5 +4/5 +4/5 +4/5 +4/5     IMAGING:    Right shoulder x-rays from 1/21/2021 were personally reviewed with the patient and demonstrated:  FINDINGS: Frontal and internal rotation views of the right shoulder obtained. No  acute fracture or subluxation identified. Glenohumeral joint maintained. Minimal  greater tuberosity spurring and minimal lateral acromion downsloping which can  be associated with subacromial rotator cuff impingement. Mild AC joint  degenerative change. IMPRESSION:     No clearly acute findings. Mild degenerative/chronic changes as above. Left shoulder x-rays from 1/21/2021 were personally reviewed with the patient and demonstrated:  FINDINGS: Internal and external rotation views of the left shoulder obtained. No  acute fracture or subluxation identified. Glenohumeral joint maintained. Mild AC  joint degenerative change. Mild lateral acromion downsloping which may cause  rotator cuff impingement.     IMPRESSION:     No clearly acute findings. See additional details above. Cervical spine MRI from 12/23/2020 was personally reviewed with the patient and demonstrated:       Results from [place] encounter on 12/23/20   MRI CERV SPINE WO CONT     Narrative MRI Cervical Spine      CPT CODE: 50517     INDICATION: Limited cervical range of motion. Cervical spondylosis with  myelopathy.     TECHNIQUE: MRI of the cervical spine performed consisting of sagittal T1, T2 and  inversion recovery sequences with additional axial T2 sequence.      COMPARISON: Plain films 10/1/2014.     FINDINGS:   Sagittal images show mildly straightened cervical lordosis overall, but  otherwise preserved alignment without any significant listhesis.   Moderate to severe intervertebral disc space narrowing C5-6 with mild spurring. Mild diffuse disc space narrowing C6-7. No vertebral body compression deformity or edema.     Ovoid well marginated 1.4 cm high T1 and T2 signal lesion within the midline T1  loses signal on inversion recovery, most consistent with hemangioma.     Normal signal in the cervical cord. No expansion or syrinx. Diffuse a lobulated low T2 signal along the midline vertebral bodies to  intervertebral disc levels. Could reflect component of calcification versus  confluence of disc bulge/protrusion. .     Correlation With Axial Images Shows:  C2-3:  Findings suggest mild central disc protrusion, which effaces fluid space  but does not contact the cervical cord. No significant canal narrowing. Mild  right foraminal encroachment due to uncovertebral spur.     C3-4:  Broad-based posterior disc bulge with some endplate osteophytes. Ventral  fluid space is effaced, with questionable possible cord contact. Mild central  canal narrowing to 9 mm. Mild left foraminal encroachment due predominantly to  hypertrophy of the left facet joints.     C4-5:  Irregular broad-based posterior disc bulge with endplate spur complex. This contacts the ventral cervical cord and results in mild distortion midline  and right paramedian. Overall mild to moderate central canal narrowing to 8 mm. Very mild left foraminal narrowing due to uncovertebral spur predominantly.     C5-6:  Broad-based disc osteophyte complex effaces fluid space and slightly  flattens the ventral cervical cord. No edema. Moderate central canal narrowing  to 8 mm. Moderate to severe right foraminal narrowing due to asymmetric  uncovertebral spur predominantly. Disc bulge may contribute.     C6-7:  Broad-based disc osteophyte complex, with mild central canal narrowing to  9 mm. Questionable cord contact but no distortion. Mild right and left foraminal  narrowing due to uncovertebral spur.     C7-T1:  Minimal disc bulge.  No significant canal narrowing. Mild right foraminal  encroachment due to spur.           Impression IMPRESSION:  1. Preserved vertebral alignment without compression deformity or appreciable  edema.     2. Multilevel central canal stenosis up to moderate degree due to disc  osteophyte complex as above. Mild ventral cord contact and distortion several  levels, without edema. .     3. Varying degrees of bilateral neural foraminal stenosis at several levels as  outlined above.      Left upper extremity EMG from 12/28/2020 was personally reviewed with the patient and demonstrated:  Interpretation Summary: This EMG study, as performed, is suggestive of a chronic multilevel cervical radiculopathy. Written by Perfecto Berry, as dictated by Won Aldana MD.  I, Dr. Won Aldana confirm that all documentation is accurate.

## 2021-02-01 ENCOUNTER — OFFICE VISIT (OUTPATIENT)
Dept: ORTHOPEDIC SURGERY | Age: 76
End: 2021-02-01
Payer: MEDICARE

## 2021-02-01 VITALS
TEMPERATURE: 97.6 F | OXYGEN SATURATION: 97 % | DIASTOLIC BLOOD PRESSURE: 79 MMHG | SYSTOLIC BLOOD PRESSURE: 152 MMHG | BODY MASS INDEX: 27.77 KG/M2 | HEIGHT: 71 IN | WEIGHT: 198.4 LBS | HEART RATE: 75 BPM

## 2021-02-01 DIAGNOSIS — R79.82 ELEVATED C-REACTIVE PROTEIN (CRP): ICD-10-CM

## 2021-02-01 DIAGNOSIS — M54.12 CERVICAL RADICULOPATHY: ICD-10-CM

## 2021-02-01 DIAGNOSIS — G89.29 CHRONIC PAIN OF BOTH SHOULDERS: ICD-10-CM

## 2021-02-01 DIAGNOSIS — M19.012 PRIMARY OSTEOARTHRITIS OF SHOULDERS, BILATERAL: ICD-10-CM

## 2021-02-01 DIAGNOSIS — M62.838 MUSCLE SPASM: ICD-10-CM

## 2021-02-01 DIAGNOSIS — M48.02 CERVICAL SPINAL STENOSIS: Primary | ICD-10-CM

## 2021-02-01 DIAGNOSIS — M25.512 CHRONIC PAIN OF BOTH SHOULDERS: ICD-10-CM

## 2021-02-01 DIAGNOSIS — M62.81 PROXIMAL MUSCLE WEAKNESS: ICD-10-CM

## 2021-02-01 DIAGNOSIS — M19.011 PRIMARY OSTEOARTHRITIS OF SHOULDERS, BILATERAL: ICD-10-CM

## 2021-02-01 DIAGNOSIS — M25.511 CHRONIC PAIN OF BOTH SHOULDERS: ICD-10-CM

## 2021-02-01 PROCEDURE — G8536 NO DOC ELDER MAL SCRN: HCPCS | Performed by: PHYSICAL MEDICINE & REHABILITATION

## 2021-02-01 PROCEDURE — G8432 DEP SCR NOT DOC, RNG: HCPCS | Performed by: PHYSICAL MEDICINE & REHABILITATION

## 2021-02-01 PROCEDURE — 99214 OFFICE O/P EST MOD 30 MIN: CPT | Performed by: PHYSICAL MEDICINE & REHABILITATION

## 2021-02-01 PROCEDURE — 1101F PT FALLS ASSESS-DOCD LE1/YR: CPT | Performed by: PHYSICAL MEDICINE & REHABILITATION

## 2021-02-01 PROCEDURE — G8419 CALC BMI OUT NRM PARAM NOF/U: HCPCS | Performed by: PHYSICAL MEDICINE & REHABILITATION

## 2021-02-01 PROCEDURE — G8427 DOCREV CUR MEDS BY ELIG CLIN: HCPCS | Performed by: PHYSICAL MEDICINE & REHABILITATION

## 2021-02-01 RX ORDER — PREDNISONE 10 MG/1
10 TABLET ORAL DAILY
Qty: 30 TAB | Refills: 1 | Status: CANCELLED | OUTPATIENT
Start: 2021-02-01

## 2021-02-01 RX ORDER — PREDNISONE 10 MG/1
TABLET ORAL
Qty: 44 TAB | Refills: 0 | Status: SHIPPED | OUTPATIENT
Start: 2021-02-01 | End: 2021-03-30 | Stop reason: ALTCHOICE

## 2021-02-01 NOTE — LETTER
2/2/2021 Patient: Betzaida Kerr YOB: 1945 Date of Visit: 2/1/2021 Leisa Beaver MD 
[address] [facility] [address] [address] Via Fax: [phone] Dear Leisa Beaver MD, Thank you for referring Mr. Griffin Harrington to [facility] for evaluation. My notes for this consultation are attached. If you have questions, please do not hesitate to call me. I look forward to following your patient along with you. Sincerely, Maurisio Odom MD

## 2021-02-01 NOTE — PATIENT INSTRUCTIONS
Neck Arthritis: Exercises Introduction Here are some examples of exercises for you to try. The exercises may be suggested for a condition or for rehabilitation. Start each exercise slowly. Ease off the exercises if you start to have pain. You will be told when to start these exercises and which ones will work best for you. How to do the exercises Neck stretches to the side 1. This stretch works best if you keep your shoulder down as you lean away from it. To help you remember to do this, start by relaxing your shoulders and lightly holding on to your thighs or your chair. 2. Tilt your head toward your shoulder and hold for 15 to 30 seconds. Let the weight of your head stretch your muscles. 3. Repeat 2 to 4 times toward each shoulder. Chin tuck 1. Lie on the floor with a rolled-up towel under your neck. Your head should be touching the floor. 2. Slowly bring your chin toward your chest. 
3. Hold for a count of 6, and then relax for up to 10 seconds. 4. Repeat 8 to 12 times. Active cervical rotation 1. Sit in a firm chair, or stand up straight. 2. Keeping your chin level, turn your head to the right, and hold for 15 to 30 seconds. 3. Turn your head to the left and hold for 15 to 30 seconds. 4. Repeat 2 to 4 times to each side. Shoulder blade squeeze 1. While standing, squeeze your shoulder blades together. 2. Do not raise your shoulders up as you are squeezing. 3. Hold for 6 seconds. 4. Repeat 8 to 12 times. Shoulder rolls 1. Sit comfortably with your feet shoulder-width apart. You can also do this exercise standing up. 2. Roll your shoulders up, then back, and then down in a smooth, circular motion. 3. Repeat 2 to 4 times. Follow-up care is a key part of your treatment and safety. Be sure to make and go to all appointments, and call your doctor if you are having problems. It's also a good idea to know your test results and keep a list of the medicines you take. Where can you learn more? Go to http://www.gray.com/ Enter A697 in the search box to learn more about \"Neck Arthritis: Exercises. \" Current as of: March 2, 2020               Content Version: 12.6 © 9189-8266 [employer]. Care instructions adapted under license by [employer] (which disclaims liability or warranty for this information). If you have questions about a medical condition or this instruction, always ask your healthcare professional. [address] any warranty or liability for your use of this information. Shoulder Arthritis: Exercises Introduction Here are some examples of exercises for you to try. The exercises may be suggested for a condition or for rehabilitation. Start each exercise slowly. Ease off the exercises if you start to have pain. You will be told when to start these exercises and which ones will work best for you. How to do the exercises Shoulder flexion (lying down) To make a wand for this exercise, use a piece of PVC pipe or a broom handle with the broom removed. Make the wand about a foot wider than your shoulders. 4. Lie on your back, holding a wand with both hands. Your palms should face down as you hold the wand. 5. Keeping your elbows straight, slowly raise your arms over your head. Raise them until you feel a stretch in your shoulders, upper back, and chest. 
6. Hold for 15 to 30 seconds. 7. Repeat 2 to 4 times. Shoulder rotation (lying down) To make a wand for this exercise, use a piece of PVC pipe or a broom handle with the broom removed. Make the wand about a foot wider than your shoulders. 5. Lie on your back. Hold a wand with both hands with your elbows bent and palms up. 6. Keep your elbows close to your body, and move the wand across your body toward the sore arm. 7. Hold for 8 to 12 seconds. 8. Repeat 2 to 4 times. Shoulder internal rotation with towel 5. Hold a towel above and behind your head with the arm that is not sore. 6. With your sore arm, reach behind your back and grasp the towel. 7. With the arm above your head, pull the towel upward. Do this until you feel a stretch on the front and outside of your sore shoulder. 8. Hold 15 to 30 seconds. 9. Repeat 2 to 4 times. Shoulder blade squeeze 5. Stand with your arms at your sides, and squeeze your shoulder blades together. Do not raise your shoulders up as you squeeze. 6. Hold 6 seconds. 7. Repeat 8 to 12 times. Resisted rows For this exercise, you will need elastic exercise material, such as surgical tubing or Thera-Band. 4. Put the band around a solid object at about waist level. (A bedpost will work well.) Each hand should hold an end of the band. 5. With your elbows at your sides and bent to 90 degrees, pull the band back. Your shoulder blades should move toward each other. Return to the starting position. 6. Repeat 8 to 12 times. External rotator strengthening exercise 1. Start by tying a piece of elastic exercise material to a doorknob. You can use surgical tubing or Thera-Band. (You may also hold one end of the band in each hand.) 2. Stand or sit with your shoulder relaxed and your elbow bent 90 degrees. Your upper arm should rest comfortably against your side. Squeeze a rolled towel between your elbow and your body for comfort. This will help keep your arm at your side. 3. Hold one end of the elastic band with the hand of the painful arm. 4. Start with your forearm across your belly. Slowly rotate the forearm out away from your body. Keep your elbow and upper arm tucked against the towel roll or the side of your body until you begin to feel tightness in your shoulder. Slowly move your arm back to where you started. 5. Repeat 8 to 12 times. Internal rotator strengthening exercise 1. Start by tying a piece of elastic exercise material to a doorknob. You can use surgical tubing or Thera-Band. 2. Stand or sit with your shoulder relaxed and your elbow bent 90 degrees. Your upper arm should rest comfortably against your side. Squeeze a rolled towel between your elbow and your body for comfort. This will help keep your arm at your side. 3. Hold one end of the elastic band in the hand of the painful arm. 4. Slowly rotate your forearm toward your body until it touches your belly. Slowly move it back to where you started. 5. Keep your elbow and upper arm firmly tucked against the towel roll or at your side. 6. Repeat 8 to 12 times. Pendulum swing If you have pain in your back, do not do this exercise. 1. Hold on to a table or the back of a chair with your good arm. Then bend forward a little and let your sore arm hang straight down. This exercise does not use the arm muscles. Rather, use your legs and your hips to create movement that makes your arm swing freely. 2. Use the movement from your hips and legs to guide the slightly swinging arm back and forth like a pendulum (or elephant trunk). Then guide it in circles that start small (about the size of a dinner plate). Make the circles a bit larger each day, as your pain allows. 3. Do this exercise for 5 minutes, 5 to 7 times each day. 4. As you have less pain, try bending over a little farther to do this exercise. This will increase the amount of movement at your shoulder. Follow-up care is a key part of your treatment and safety. Be sure to make and go to all appointments, and call your doctor if you are having problems. It's also a good idea to know your test results and keep a list of the medicines you take. Where can you learn more? Go to http://www.gray.com/ Enter H562 in the search box to learn more about \"Shoulder Arthritis: Exercises. \" 
 Current as of: March 2, 2020               Content Version: 12.6 © 1237-1825 [employer]. Care instructions adapted under license by [employer] (which disclaims liability or warranty for this information). If you have questions about a medical condition or this instruction, always ask your healthcare professional. [address] any warranty or liability for your use of this information.

## 2021-02-02 ENCOUNTER — HOSPITAL ENCOUNTER (OUTPATIENT)
Dept: PHYSICAL THERAPY | Age: 76
Discharge: HOME | End: 2021-02-02
Payer: MEDICARE

## 2021-02-02 PROCEDURE — 97110 THERAPEUTIC EXERCISES: CPT

## 2021-02-02 PROCEDURE — 97112 NEUROMUSCULAR REEDUCATION: CPT

## 2021-02-02 NOTE — PROGRESS NOTES
PT DAILY TREATMENT NOTE     Patient Name: Oli Cintron  Date:2021  : 1945  [x]  Patient  Verified  Payor: [place] MEDICARE / Plan: [place] MEDICARE PART A & B / Product Type: Medicare /    In time:11:10  Out time: 11:55  Total Treatment Time (min): 45  Visit #: 6 of 10    Medicare/BCBS Only   Total Timed Codes (min):  45 1:1 Treatment Time:  45       Treatment Area: Neck pain [M54.2]  Right shoulder pain [M25.511]  Left shoulder pain [M25.512]    SUBJECTIVE  Pain Level (0-10 scale): 0  Any medication changes, allergies to medications, adverse drug reactions, diagnosis change, or new procedure performed?: [x] No    [] Yes (see summary sheet for update)  Subjective functional status/changes:   [] No changes reported  I don't have much pain during the day. But at night it feels like pins sticking in it    OBJECTIVE      30 min Therapeutic Exercise:  [] See flow sheet :   Rationale: increase ROM and increase strength to improve the patients ability to improve activity tolerance for functional tasks     15 min Neuromuscular Re-education:  []  See flow sheet :   Rationale: increase strength and increase proprioception  to improve the patients ability to improve scapular and postural stability         With   [] TE   [] TA   [] neuro   [] other: Patient Education: [x] Review HEP    [] Progressed/Changed HEP based on:   [] positioning   [] body mechanics   [] transfers   [] heat/ice application    [] other:      Other Objective/Functional Measures:      Pain Level (0-10 scale) post treatment: 0    ASSESSMENT/Changes in Function: postural cues to correct for slouched posture.   Difficulty with active shoulder retraction and assist to engage     Patient will continue to benefit from skilled PT services to modify and progress therapeutic interventions, address functional mobility deficits, address ROM deficits, address strength deficits, analyze and address soft tissue restrictions, analyze and cue movement patterns, analyze and modify body mechanics/ergonomics, assess and modify postural abnormalities, address imbalance/dizziness and instruct in home and community integration to attain remaining goals. []  See Plan of Care  []  See progress note/recertification  []  See Discharge Summary         Progress towards goals / Updated goals:  1. Pt will report compliance and independence to HEP to help the pt manage their pain and symptoms.           Eval: established  Current status: met - Pt reports compliance with HEP  Long Term Goals: To be accomplished in 10 treatments:  1. Pt will increase FOTO score to 73 points to improve ability to perform ADLs. Eval: 63 points  Current status: reassess at MD note  2. Pt will increase AROM left shoulder flex and ABD to 100 degs in sitting to improve ability to perform household activities.   Eval: flex 78 degs, ABD 64 degs (both with pain in anterior shoulder and upper UE)  Current status: met - Flex 140 deg, Scaption 143 deg  3. Pt will increase AROM right shoulder flex to 150 degs to improve ability to reach into a kitchen cabinet with less pain. Eval: 132 degs with discomfort. Current status: progressing - Flex 145 deg, Scaption 155 deg  4. Pt will report being able to return to yard work and house work activities with minimal to no increased pain in B shoulders to improve independence with household tasks.   Eval: cannot do full yard and house work activities because of pain and limited B shoulders.   Current status: reassess closer to MD note    PLAN  [x]  Upgrade activities as tolerated     []  Continue plan of care  []  Update interventions per flow sheet       []  Discharge due to:_  []  Other:_      Ignacia Brian, MARLINE 2/2/2021  11:12 AM    Future Appointments   Date Time Provider Greyson Moore   2/2/2021 11:15 AM [facility]   2/4/2021 11:15 AM [facility]   2/9/2021 11:15 AM Jose M [address]   2/11/2021 11:15 AM Nemo Leon [facility]   2/16/2021 11:15 AM Dinora Lizarraga, [facility]   2/16/2021  1:15 PM Ced Villagomez MD [facility]   6/10/2021 10:45 AM Shaneka López MD [address] [facility]

## 2021-02-04 ENCOUNTER — HOSPITAL ENCOUNTER (OUTPATIENT)
Dept: PHYSICAL THERAPY | Age: 76
Discharge: HOME | End: 2021-02-04
Payer: MEDICARE

## 2021-02-04 PROCEDURE — 97112 NEUROMUSCULAR REEDUCATION: CPT

## 2021-02-04 PROCEDURE — 97110 THERAPEUTIC EXERCISES: CPT

## 2021-02-04 NOTE — PROGRESS NOTES
PT DAILY TREATMENT NOTE     Patient Name: Andrés Garcia  Date:2021  : 1945  [x]  Patient  Verified  Payor: [place] MEDICARE / Plan: [place] MEDICARE PART A & B / Product Type: Medicare /    In time:11:15  Out time: 11;55  Total Treatment Time (min): 40  Visit #: 7 of 10    Medicare/BCBS Only   Total Timed Codes (min):  40 1:1 Treatment Time:  40       Treatment Area: Neck pain [M54.2]  Right shoulder pain [M25.511]  Left shoulder pain [M25.512]    SUBJECTIVE  Pain Level (0-10 scale): 1  Any medication changes, allergies to medications, adverse drug reactions, diagnosis change, or new procedure performed?: [x] No    [] Yes (see summary sheet for update)  Subjective functional status/changes:   [] No changes reported  I'm feeling OK    OBJECTIVE    25 min Therapeutic Exercise:  [] See flow sheet :   Rationale: increase ROM and increase strength to improve the patients ability to perform functional tasks     15 min Neuromuscular Re-education:  []  See flow sheet :   Rationale: increase strength, improve coordination and increase proprioception  to improve the patients ability to increase scapular stability and posture      With   [] TE   [] TA   [] neuro   [] other: Patient Education: [x] Review HEP    [] Progressed/Changed HEP based on:   [] positioning   [] body mechanics   [] transfers   [] heat/ice application    [] other:      Other Objective/Functional Measures:      Pain Level (0-10 scale) post treatment: 1    ASSESSMENT/Changes in Function: progressing with shoulder AROM on left, but limited on the right    Patient will continue to benefit from skilled PT services to modify and progress therapeutic interventions, address functional mobility deficits, address ROM deficits, address strength deficits, analyze and address soft tissue restrictions, analyze and cue movement patterns, analyze and modify body mechanics/ergonomics, assess and modify postural abnormalities, address imbalance/dizziness and instruct in home and community integration to attain remaining goals. []  See Plan of Care  []  See progress note/recertification  []  See Discharge Summary         Progress towards goals / Updated goals:  1. Pt will report compliance and independence to HEP to help the pt manage their pain and symptoms.           Eval: established  Current status: met - Pt reports compliance with HEP  Long Term Goals: To be accomplished in 10 treatments:  1. Pt will increase FOTO score to 73 points to improve ability to perform ADLs. Eval: 63 points  Current status: reassess at MD note  2. Pt will increase AROM left shoulder flex and ABD to 100 degs in sitting to improve ability to perform household activities.   Eval: flex 78 degs, ABD 64 degs (both with pain in anterior shoulder and upper UE)  Current status: met - Flex 140 deg, Scaption 143 deg  3. Pt will increase AROM right shoulder flex to 150 degs to improve ability to reach into a kitchen cabinet with less pain. Eval: 132 degs with discomfort. Current status: progressing - Flex 145 deg, Scaption 155 deg  4. Pt will report being able to return to yard work and house work activities with minimal to no increased pain in B shoulders to improve independence with household tasks.   Eval: cannot do full yard and house work activities because of pain and limited B shoulders.   Current status: reassess closer to MD note       PLAN  [x]  Upgrade activities as tolerated     []  Continue plan of care  []  Update interventions per flow sheet       []  Discharge due to:_  []  Other:_      Hattie Regalado, PTA 2/4/2021  11:39 AM    Future Appointments   Date Time Provider Greyson Moore   2/9/2021 11:15 AM Noe Garzon [facility]   2/11/2021 11:15 AM Noe Garzon [facility]   2/16/2021 11:15 AM Jovanny Lizarraga, PT [facility]   2/16/2021  1:15 PM Maude Mccall MD [facility]   6/10/2021 10:45 AM Cleopatra Headley MD [address] [facility]

## 2021-02-09 ENCOUNTER — HOSPITAL ENCOUNTER (OUTPATIENT)
Dept: PHYSICAL THERAPY | Age: 76
Discharge: HOME | End: 2021-02-09
Payer: MEDICARE

## 2021-02-09 PROCEDURE — 97112 NEUROMUSCULAR REEDUCATION: CPT

## 2021-02-09 PROCEDURE — 97110 THERAPEUTIC EXERCISES: CPT

## 2021-02-09 NOTE — PROGRESS NOTES
PT DAILY TREATMENT NOTE     Patient Name: Indu Riggs  Date:2021  : 1945  [x]  Patient  Verified  Payor: [place] MEDICARE / Plan: [place] MEDICARE PART A & B / Product Type: Medicare /    In time:11:15  Out time: 12:00  [place] Treatment Time (min): 45  Visit #: 8 of 10    Medicare/BCBS Only   Total Timed Codes (min):  45 1:1 Treatment Time:  45       Treatment Area: Neck pain [M54.2]  Right shoulder pain [M25.511]  Left shoulder pain [M25.512]    SUBJECTIVE  Pain Level (0-10 scale): 0  Any medication changes, allergies to medications, adverse drug reactions, diagnosis change, or new procedure performed?: [x] No    [] Yes (see summary sheet for update)  Subjective functional status/changes:   [] No changes reported  I feel really good. But not sure if it's being masked by the steroids. Started on Friday from Dr. Christianna Osler. I still have a few more days of them.   And I only take the pain meds at night to sleep    OBJECTIVE       20 min Therapeutic Exercise:  [] See flow sheet :   Rationale: increase ROM and increase strength to improve the patients ability to perform functional tasks       25 min Neuromuscular Re-education:  []  See flow sheet :   Rationale: increase strength and increase proprioception  to improve the patients ability to improve scapulothoracic stability       With   [] TE   [] TA   [] neuro   [] other: Patient Education: [x] Review HEP    [] Progressed/Changed HEP based on:   [] positioning   [] body mechanics   [] transfers   [] heat/ice application    [] other:      Other Objective/Functional Measures:   80% improved  Gains: improved sleep to 4 hour intervals  Deficits: able to perform all tasks as needed at the same level prior to flare of symptoms     Pain Level (0-10 scale) post treatment: 0    ASSESSMENT/Changes in Function:  See goals    Patient will continue to benefit from skilled PT services to modify and progress therapeutic interventions, address functional mobility deficits, address ROM deficits, address strength deficits, analyze and address soft tissue restrictions, analyze and cue movement patterns, analyze and modify body mechanics/ergonomics, assess and modify postural abnormalities, address imbalance/dizziness and instruct in home and community integration to attain remaining goals. []  See Plan of Care  []  See progress note/recertification  []  See Discharge Summary         Progress towards goals / Updated goals:  1. Pt will report compliance and independence to HEP to help the pt manage their pain and symptoms.           Eval: established  Current status: met - Pt reports compliance with HEP  Long Term Goals: To be accomplished in 10 treatments:  1. Pt will increase FOTO score to 73 points to improve ability to perform ADLs. Eval: 63 points  Current status: reassess at MD note  2. Pt will increase AROM left shoulder flex and ABD to 100 degs in sitting to improve ability to perform household activities.   Eval: flex 78 degs, ABD 64 degs (both with pain in anterior shoulder and upper UE)  Current status: met - Flex 140 deg, Scaption 143 deg  3. Pt will increase AROM right shoulder flex to 150 degs to improve ability to reach into a kitchen cabinet with less pain. Eval: 132 degs with discomfort. Current status: progressing - Flex 145 deg, Scaption 155 deg  4. Pt will report being able to return to yard work and house work activities with minimal to no increased pain in B shoulders to improve independence with household tasks.   Eval: cannot do full yard and house work activities because of pain and limited B shoulders.   Current status: reassess closer to MD note    PLAN  [x]  Upgrade activities as tolerated     []  Continue plan of care  []  Update interventions per flow sheet       []  Discharge due to:_  []  Other:_      Radha Oliver PTA 2/9/2021  11:25 AM    Future Appointments   Date Time Provider Greyson Moore   2/11/2021 11:15 AM Venancio Conde [facility]   2/16/2021 11:15 AM Arsh Lizarraga PT [facility]   2/16/2021  1:15 PM Dusty Armendariz MD [facility]   6/10/2021 10:45 AM Frantz Caceres MD [address] [facility]

## 2021-02-11 ENCOUNTER — HOSPITAL ENCOUNTER (OUTPATIENT)
Dept: PHYSICAL THERAPY | Age: 76
Discharge: HOME | End: 2021-02-11
Payer: MEDICARE

## 2021-02-11 PROCEDURE — 97110 THERAPEUTIC EXERCISES: CPT

## 2021-02-11 PROCEDURE — 97530 THERAPEUTIC ACTIVITIES: CPT

## 2021-02-11 PROCEDURE — 97112 NEUROMUSCULAR REEDUCATION: CPT

## 2021-02-11 NOTE — PROGRESS NOTES
PT DAILY TREATMENT NOTE     Patient Name: Michael Bowles  Date:2021  : 1945  [x]  Patient  Verified  Payor: [place] MEDICARE / Plan: [place] MEDICARE PART A & B / Product Type: Medicare /    In time:11:15  Out time:12:00  Total Treatment Time (min): 45  Visit #: 9 of 10    Medicare/BCBS Only   Total Timed Codes (min):  45 1:1 Treatment Time:  45       Treatment Area: Neck pain [M54.2]  Right shoulder pain [M25.511]  Left shoulder pain [M25.512]    SUBJECTIVE  Pain Level (0-10 scale): 0  Any medication changes, allergies to medications, adverse drug reactions, diagnosis change, or new procedure performed?: [x] No    [] Yes (see summary sheet for update)  Subjective functional status/changes:   [] No changes reported  I se the MD next week  I'm curious if I will get any new information about my shoulders    OBJECTIVE        15 min Therapeutic Exercise:  [] See flow sheet :   Rationale: increase ROM and increase strength to improve the patients ability to perform ADL's    10 min Therapeutic Activity:  []  See flow sheet : FOTO reassess   Rationale: increase ROM and increase strength  to improve the patients ability to improve esae of lifting/carrying     20 min Neuromuscular Re-education:  []  See flow sheet :   Rationale: increase strength and increase proprioception  to improve the patients ability to increase shoulder stability            With   [] TE   [] TA   [] neuro   [] other: Patient Education: [x] Review HEP    [] Progressed/Changed HEP based on:   [] positioning   [] body mechanics   [] transfers   [] heat/ice application    [] other:      Other Objective/Functional Measures:   Deficits/limits: p is cautious with lifitng/ carrying, keeping items close to the body.   But still moves items about 20-25# around as needed   Still plays tennis 3 x week    Pain Level (0-10 scale) post treatment: 0    ASSESSMENT/Changes in Function:  See goals    Patient will continue to benefit from skilled PT services to modify and progress therapeutic interventions, address functional mobility deficits, address ROM deficits, address strength deficits, analyze and address soft tissue restrictions, analyze and cue movement patterns, analyze and modify body mechanics/ergonomics, assess and modify postural abnormalities, address imbalance/dizziness and instruct in home and community integration to attain remaining goals. []  See Plan of Care  []  See progress note/recertification  []  See Discharge Summary         Progress towards goals / Updated goals:  1. Pt will report compliance and independence to HEP to help the pt manage their pain and symptoms.           Eval: established  Current status: met - Pt reports compliance with HEP  Long Term Goals: To be accomplished in 10 treatments:  1. Pt will increase FOTO score to 73 points to improve ability to perform ADLs. Eval: 63 points  Current status: FOTO 69  2. Pt will increase AROM left shoulder flex and ABD to 100 degs in sitting to improve ability to perform household activities.   Eval: flex 78 degs, ABD 64 degs (both with pain in anterior shoulder and upper UE)  Current status: met - Flex 140 deg, Scaption 143 deg  3. Pt will increase AROM right shoulder flex to 150 degs to improve ability to reach into a kitchen cabinet with less pain. Eval: 132 degs with discomfort. Current status: progressing - Flex 145 deg, Scaption 155 deg  4. Pt will report being able to return to yard work and house work activities with minimal to no increased pain in B shoulders to improve independence with household tasks.   Eval: cannot do full yard and house work activities because of pain and limited B shoulders.   Current status: progressing painted inside house with no pain, but no outside work       PLAN  [x]  Upgrade activities as tolerated     []  Continue plan of care  []  Update interventions per flow sheet       []  Discharge due to:_  []  Other:_      Darin Burk, PTA 2/11/2021  11:17 AM    Future Appointments   Date Time Provider Greyson Moore   2/16/2021 11:15 AM Emilee Lizarraga, PT [facility]   2/16/2021  1:15 PM John Guerrero MD [facility] AMB   6/10/2021 10:45 AM Daryl Kapoor MD [address] [facility]

## 2021-02-15 NOTE — PROGRESS NOTES
[facility]  [address] [place], [address]  Phone: [phone]  Fax: [phone]    Pt's YOB: 1945    ASSESSMENT   Diagnoses and all orders for this visit:    1. Cervical spinal stenosis    2. PMR (polymyalgia rheumatica) (HCC)  -     predniSONE (DELTASONE) 5 mg tablet; Take 1 tab by mouth daily (with food) as directed. 3. Proximal muscle weakness    4. Muscle spasm    5. Primary osteoarthritis of shoulders, bilateral         IMPRESSION AND PLAN:  Renay Chirinos is a 67 y.o. male with history of cervical and shoulder pain. He reports significant improvement in his pain since taking prednisone 10 mg since his last office visit. Pt attended his last session of physical therapy today and reports benefit with exercises. 1) Pt was given information on cervical and shoulder arthritis exercises. 2) He was prescribed prednisone 5 mg 1 tab daily as directed (with food). 3) Pt may decrease his Neurontin 300 mg to cap daily as tolerated. Rico Fox 4) Mr. Radha Sharma has a reminder for a \"due or due soon\" health maintenance. I have asked that he contact his primary care provider, Jose Monae MD, for follow-up on this health maintenance. 5)  demonstrated consistency with prescribing. Follow-up and Dispositions    · Return in about 6 weeks (around 3/30/2021) for Medication follow up. HISTORY OF PRESENT ILLNESS:  Renay Chirinos is a 67 y.o. male with history of cervical and shoulder pain and presents to the office today for follow up. He complains of pain in the upper back that radiates down both arms to the elbows, L>R. Pt also complains of aching pain, stiffness, and limited range of motion in both shoulders, L>R. He reports significant relief and improvement in his sleep when taking prednisone 10 mg. Pt decreased his prednisone 10 to 1 tab daily on Sunday, 2/14/2021.  He denies and headaches or visual changes with the prednisone. Pt continues to take Neurontin 300 mg 1 cap QAM and 1 cap QHS. He uses Voltaren gel on his shoulders as needed with relief. Pt attended his last session of physical therapy today and reports benefit with exercises. Labs from 1/27/2021 demonstrated a C-reactive protein of 2.2 mg/dL (reference range 0-0.3) and sed rate of 27 mm/hr (reference range of 0-20). Pt at this time desires to proceed with medication evaluation. He notes that he received his first dose of the COVID-19 vaccine yesterday.      Pain Scale: 0 - No pain/10    PCP: Lawyer Jaye MD     Past Medical History:   Diagnosis Date    [facility]    Elevated prostate specific antigen (PSA)     Essential hypertension     Hiatal hernia     Nodular prostate         Social History     Socioeconomic History    Marital status:      Spouse name: Not on file    Number of children: Not on file    Years of education: Not on file    Highest education level: Not on file   Occupational History    Not on file   Social Needs    Financial resource strain: Not on file    Food insecurity     Worry: Not on file     Inability: Not on file    Transportation needs     Medical: Not on file     Non-medical: Not on file   Tobacco Use    Smoking status: Never Smoker    Smokeless tobacco: Never Used   Substance and Sexual Activity    Alcohol use: Yes     Comment: OCASSIONAL-WINE    Drug use: No    Sexual activity: Yes   Lifestyle    Physical activity     Days per week: Not on file     Minutes per session: Not on file    Stress: Not on file   Relationships    Social connections     Talks on phone: Not on file     Gets together: Not on file     Attends Zoroastrian service: Not on file     Active member of club or organization: Not on file     Attends meetings of clubs or organizations: Not on file     Relationship status: Not on file    Intimate partner violence     Fear of current or ex partner: Not on file Emotionally abused: Not on file     Physically abused: Not on file     Forced sexual activity: Not on file   Other Topics Concern    Not on file   Social History Narrative    Not on file       Current Outpatient Medications   Medication Sig Dispense Refill    torsemide (DEMADEX) 5 mg tablet Take 1 Tab by mouth daily.  [START ON 3/1/2021] predniSONE (DELTASONE) 5 mg tablet Take 1 tab by mouth daily (with food) as directed. 30 Tab 1    predniSONE (DELTASONE) 10 mg tablet Take 2 tabs by mouth in the morning with food for 2 weeks then decrease to 1 tab in the morning as directed. 44 Tab 0    finasteride (Proscar) 5 mg tablet Take 1 Tab by mouth daily. 90 Tab 2    gabapentin (NEURONTIN) 300 mg capsule Take 300 mg by mouth two (2) times a day.  meclizine (ANTIVERT) 25 mg tablet Take 1 tablet by mouth every 6 hours for the next 3 days. Then stop taking the meclizine. Restart the meclizine for 3 day intervals if vertigo/ dizziness returns. 20 Tab 0    ketorolac (ACULAR) 0.5 % ophthalmic solution       omeprazole (PRILOSEC) 10 mg capsule Take 10 mg by mouth daily.  lisinopril (PRINIVIL, ZESTRIL) 5 mg tablet Take  by mouth daily.  baclofen (LIORESAL) 10 mg tablet Take 1 Tab by mouth two (2) times daily as needed.  famotidine (PEPCID) 20 mg tablet Take 1 Tab by mouth two (2) times daily as needed.  simvastatin (ZOCOR) 20 mg tablet       tamsulosin (FLOMAX) 0.4 mg capsule One cap q d pc (Patient not taking: Reported on 1/11/2021) 90 Cap 3    latanoprost (XALATAN) 0.005 % ophthalmic solution Administer 1 Drop to left eye nightly. No Known Allergies      REVIEW OF SYSTEMS    Constitutional: Negative for fever, chills, or weight change. Respiratory: Negative for cough or shortness of breath. Cardiovascular: Negative for chest pain or palpitations. Gastrointestinal: Negative for acid reflux, change in bowel habits, or constipation.   Genitourinary: Negative for dysuria and flank pain. Musculoskeletal: Positive for cervical and bilateral shoulder pain. Neurological: Negative for headaches, dizziness, or numbness. Endo/Heme/Allergies: Negative for increased bruising. Psychiatric/Behavioral: Negative for difficulty with sleep. As per HPI    PHYSICAL EXAMINATION  Visit Vitals  BP 94/60   Pulse 76   Temp 97.5 °F (36.4 °C) (Temporal)   Resp 12   Ht 5' 11\" (1.803 m)   Wt 191 lb 3.2 oz (86.7 kg)   SpO2 93%   BMI 26.67 kg/m²       Constitutional: Awake, alert, and in no acute distress. Neurological: 1+ symmetrical DTRs in the upper extremities. 1+ symmetrical DTRs in the lower extremities. Sensation to light touch is intact. Negative Trimble's sign bilaterally. Skin: warm, dry, and intact. Musculoskeletal: Good range of motion in both shoulders, significant improvement since his last office visit. No pain with extension, axial loading, or forward flexion. No pain with internal or external rotation of his hips. Negative straight leg raise bilaterally. Biceps  Triceps Deltoids Wrist Ext Wrist Flex Hand Intrin   Right +4/5 +4/5 +4/5 +4/5 +4/5 +4/5   Left +4/5 +4/5 +4/5 +4/5 +4/5 +4/5      Hip Flex  Quads Hamstrings Ankle DF EHL Ankle PF   Right +4/5 +4/5 +4/5 +4/5 +4/5 +4/5   Left +4/5 +4/5 +4/5 +4/5 +4/5 +4/5     IMAGING:    Right shoulder x-rays from 1/21/2021 were personally reviewed with the patient and demonstrated:  FINDINGS: Frontal and internal rotation views of the right shoulder obtained. No  acute fracture or subluxation identified. Glenohumeral joint maintained. Minimal  greater tuberosity spurring and minimal lateral acromion downsloping which can  be associated with subacromial rotator cuff impingement. Mild AC joint  degenerative change.     IMPRESSION:     No clearly acute findings.  Mild degenerative/chronic changes as above.     Left shoulder x-rays from 1/21/2021 were personally reviewed with the patient and demonstrated:  FINDINGS: Internal and external rotation views of the left shoulder obtained. No  acute fracture or subluxation identified. Glenohumeral joint maintained. Mild AC  joint degenerative change. Mild lateral acromion downsloping which may cause  rotator cuff impingement.     IMPRESSION:     No clearly acute findings. See additional details above.     Cervical spine MRI from 12/23/2020 was personally reviewed with the patient and demonstrated:          Results from [place] encounter on 12/23/20   MRI CERV SPINE WO CONT     Narrative MRI Cervical Spine      CPT CODE: 43601     INDICATION: Limited cervical range of motion. Cervical spondylosis with  myelopathy.     TECHNIQUE: MRI of the cervical spine performed consisting of sagittal T1, T2 and  inversion recovery sequences with additional axial T2 sequence.      COMPARISON: Plain films 10/1/2014.     FINDINGS:   Sagittal images show mildly straightened cervical lordosis overall, but  otherwise preserved alignment without any significant listhesis. Moderate to severe intervertebral disc space narrowing C5-6 with mild spurring. Mild diffuse disc space narrowing C6-7. No vertebral body compression deformity or edema.     Ovoid well marginated 1.4 cm high T1 and T2 signal lesion within the midline T1  loses signal on inversion recovery, most consistent with hemangioma.     Normal signal in the cervical cord. No expansion or syrinx. Diffuse a lobulated low T2 signal along the midline vertebral bodies to  intervertebral disc levels. Could reflect component of calcification versus  confluence of disc bulge/protrusion. .     Correlation With Axial Images Shows:  C2-3:  Findings suggest mild central disc protrusion, which effaces fluid space  but does not contact the cervical cord. No significant canal narrowing. Mild  right foraminal encroachment due to uncovertebral spur.     C3-4:  Broad-based posterior disc bulge with some endplate osteophytes.  Ventral  fluid space is effaced, with questionable possible cord contact. Mild central  canal narrowing to 9 mm. Mild left foraminal encroachment due predominantly to  hypertrophy of the left facet joints.     C4-5:  Irregular broad-based posterior disc bulge with endplate spur complex. This contacts the ventral cervical cord and results in mild distortion midline  and right paramedian. Overall mild to moderate central canal narrowing to 8 mm. Very mild left foraminal narrowing due to uncovertebral spur predominantly.     C5-6:  Broad-based disc osteophyte complex effaces fluid space and slightly  flattens the ventral cervical cord. No edema. Moderate central canal narrowing  to 8 mm. Moderate to severe right foraminal narrowing due to asymmetric  uncovertebral spur predominantly. Disc bulge may contribute.     C6-7:  Broad-based disc osteophyte complex, with mild central canal narrowing to  9 mm. Questionable cord contact but no distortion. Mild right and left foraminal  narrowing due to uncovertebral spur.     C7-T1:  Minimal disc bulge. No significant canal narrowing. Mild right foraminal  encroachment due to spur.           Impression IMPRESSION:  1.  Preserved vertebral alignment without compression deformity or appreciable  edema.     2. Multilevel central canal stenosis up to moderate degree due to disc  osteophyte complex as above. Mild ventral cord contact and distortion several  levels, without edema. .     3. Varying degrees of bilateral neural foraminal stenosis at several levels as  outlined above.      Left upper extremity EMG from 12/28/2020 was personally reviewed with the patient and demonstrated:  Interpretation Summary: This EMG study, as performed, is suggestive of a chronic multilevel cervical radiculopathy. Written by Lachelle Darby, as dictated by Sofía Brenner MD.  I, Dr. Sofía Brenner confirm that all documentation is accurate.

## 2021-02-16 ENCOUNTER — HOSPITAL ENCOUNTER (OUTPATIENT)
Dept: PHYSICAL THERAPY | Age: 76
Discharge: HOME | End: 2021-02-16
Payer: MEDICARE

## 2021-02-16 ENCOUNTER — OFFICE VISIT (OUTPATIENT)
Dept: ORTHOPEDIC SURGERY | Age: 76
End: 2021-02-16
Payer: MEDICARE

## 2021-02-16 VITALS
HEIGHT: 71 IN | BODY MASS INDEX: 26.77 KG/M2 | RESPIRATION RATE: 12 BRPM | HEART RATE: 76 BPM | TEMPERATURE: 97.5 F | DIASTOLIC BLOOD PRESSURE: 60 MMHG | OXYGEN SATURATION: 93 % | WEIGHT: 191.2 LBS | SYSTOLIC BLOOD PRESSURE: 94 MMHG

## 2021-02-16 DIAGNOSIS — M62.838 MUSCLE SPASM: ICD-10-CM

## 2021-02-16 DIAGNOSIS — M35.3 PMR (POLYMYALGIA RHEUMATICA) (HCC): ICD-10-CM

## 2021-02-16 DIAGNOSIS — M62.81 PROXIMAL MUSCLE WEAKNESS: ICD-10-CM

## 2021-02-16 DIAGNOSIS — M19.012 PRIMARY OSTEOARTHRITIS OF SHOULDERS, BILATERAL: ICD-10-CM

## 2021-02-16 DIAGNOSIS — M19.011 PRIMARY OSTEOARTHRITIS OF SHOULDERS, BILATERAL: ICD-10-CM

## 2021-02-16 DIAGNOSIS — M48.02 CERVICAL SPINAL STENOSIS: Primary | ICD-10-CM

## 2021-02-16 PROCEDURE — G8419 CALC BMI OUT NRM PARAM NOF/U: HCPCS | Performed by: PHYSICAL MEDICINE & REHABILITATION

## 2021-02-16 PROCEDURE — 97110 THERAPEUTIC EXERCISES: CPT

## 2021-02-16 PROCEDURE — G8510 SCR DEP NEG, NO PLAN REQD: HCPCS | Performed by: PHYSICAL MEDICINE & REHABILITATION

## 2021-02-16 PROCEDURE — 99214 OFFICE O/P EST MOD 30 MIN: CPT | Performed by: PHYSICAL MEDICINE & REHABILITATION

## 2021-02-16 PROCEDURE — G8427 DOCREV CUR MEDS BY ELIG CLIN: HCPCS | Performed by: PHYSICAL MEDICINE & REHABILITATION

## 2021-02-16 PROCEDURE — G8536 NO DOC ELDER MAL SCRN: HCPCS | Performed by: PHYSICAL MEDICINE & REHABILITATION

## 2021-02-16 PROCEDURE — 1101F PT FALLS ASSESS-DOCD LE1/YR: CPT | Performed by: PHYSICAL MEDICINE & REHABILITATION

## 2021-02-16 RX ORDER — PREDNISONE 5 MG/1
TABLET ORAL
Qty: 30 TAB | Refills: 1 | Status: SHIPPED | OUTPATIENT
Start: 2021-03-01 | End: 2021-03-30 | Stop reason: SDUPTHER

## 2021-02-16 RX ORDER — METHYLPREDNISOLONE 4 MG/1
TABLET ORAL
COMMUNITY
Start: 2020-12-28 | End: 2021-01-03

## 2021-02-16 RX ORDER — TORSEMIDE 5 MG/1
1 TABLET ORAL DAILY
COMMUNITY
Start: 2021-01-25

## 2021-02-16 RX ORDER — FAMOTIDINE 20 MG/1
1 TABLET, FILM COATED ORAL
COMMUNITY
Start: 2020-12-31 | End: 2021-07-12 | Stop reason: ALTCHOICE

## 2021-02-16 RX ORDER — BACLOFEN 10 MG/1
1 TABLET ORAL
COMMUNITY
Start: 2020-11-19 | End: 2021-07-12 | Stop reason: ALTCHOICE

## 2021-02-16 NOTE — PROGRESS NOTES
PT DAILY TREATMENT NOTE     Patient Name: Betzaida Kerr  Date:2021  : 1945  [x]  Patient  Verified  Payor: Ceferino Benjamin / Plan: [place] MED[place] & B / Product Type: Medicare /    In time:11:15  Out time:11:53  Total Treatment Time (min): 38  Visit #: 1 of 8    Medicare/BCBS Only   Total Timed Codes (min):  38 1:1 Treatment Time:  38       Treatment Area: Neck pain [M54.2]  Right shoulder pain [M25.511]  Left shoulder pain [M25.512]    SUBJECTIVE  Pain Level (0-10 scale): 0/10  Any medication changes, allergies to medications, adverse drug reactions, diagnosis change, or new procedure performed?: [x] No    [] Yes (see summary sheet for update)  Subjective functional status/changes:   [] No changes reported  \"I feel good. The MD put me on a steroid so it helps but I don't know how my shoulders will respond when I'm not on it. I see her after my session here today, so I will see what she says about continuing and if I need to stay on the steroid pack. \"    OBJECTIVE    38 min Therapeutic Exercise:  [] See flow sheet :   Rationale: increase ROM and increase strength to improve the patients ability to increase ease of overhead activity, lifting, and carrying weighted objects          With   [] TE   [] TA   [] neuro   [] other: Patient Education: [x] Review HEP    [] Progressed/Changed HEP based on:   [] positioning   [] body mechanics   [] transfers   [] heat/ice application    [] other:      Other Objective/Functional Measures: Progressed program per flow sheet. Pain Level (0-10 scale) post treatment: 0/10    ASSESSMENT/Changes in Function: Pt requires verbal cues and at times tactile cueing for proper technique of exercises during session. Pt noted no increased pain with exercise interventions, only mild muscular fatigue.   Pt sees MD today for follow up appt and will contact clinic afterwards to determine need for scheduling further appointments or transitioning to independent management. Patient will continue to benefit from skilled PT services to address functional mobility deficits, address ROM deficits, address strength deficits, analyze and address soft tissue restrictions, analyze and cue movement patterns, analyze and modify body mechanics/ergonomics, assess and modify postural abnormalities and instruct in home and community integration to attain remaining goals. []  See Plan of Care  []  See progress note/recertification  []  See Discharge Summary         Progress towards goals / Updated goals:  Goal: Pt will increase FOTO score to 73 points to improve ability to perform ADLs. Status at last note/certification:FOTO 69 pts  Current status:  Goal: Pt will increase AROM B shoulder flex and left shoulder ABD to 150 degs in sitting to increase ease of overhead activities without difficulty.   Status at last note/certification:  Flex 140 deg left, 145 deg right; Scaption 143 deg left  Current status:   Goal: Pt will perform floor to waist and waist to shoulder height lifts of 30# with proper body mechanics and no pain for ease of carrying objects.   Status at last note/certification: new goal  Current status:   Goal: Pt will report being able to return to yard work and house work activities with minimal to no increased pain in B shoulders to improve independence with household tasks.   Status at last note/certification: painted inside house with no pain, but no outside work at this time  Current status:     PLAN  [x]  Upgrade activities as tolerated     [x]  Continue plan of care  []  Update interventions per flow sheet       []  Discharge due to:_  []  Other:_      Francisco Lizarraga, PT 2/16/2021  11:54 AM    Future Appointments   Date Time Provider Greyson Moore   2/16/2021  1:15 PM Óscar Billy MD [facility] AMB   6/10/2021 10:45 AM Suri Verdugo MD [facility]

## 2021-02-16 NOTE — LETTER
2/17/2021    Patient: Rachael Dowling   YOB: 1945   Date of Visit: 2/16/2021     Sheryl Vail MD  [address] [address]  Via Fax: [phone]    Dear Sheryl Vail MD,      Thank you for referring Mr. Griffin Mohan to [facility] for evaluation. My notes for this consultation are attached. If you have questions, please do not hesitate to call me. I look forward to following your patient along with you.       Sincerely,    Lee Ann Sorensen MD

## 2021-02-16 NOTE — PROGRESS NOTES
Christin Rosenthal presents today for   Chief Complaint   Patient presents with    Neck Pain    Shoulder Pain     bilateral; left>right    Arm Pain     bilateral; left>right    Follow-up       Is someone accompanying this pt? no    Is the patient using any DME equipment during OV? no    Depression Screening:  3 most recent PHQ Screens 2/16/2021   Little interest or pleasure in doing things Not at all   Feeling down, depressed, irritable, or hopeless Not at all   Total Score PHQ 2 0       Learning Assessment:  Learning Assessment 4/1/2016   PRIMARY LEARNER Patient   BARRIERS PRIMARY LEARNER [address] CAREGIVER No   PRIMARY LANGUAGE ENGLISH   LEARNER PREFERENCE PRIMARY READING   ANSWERED BY patient   RELATIONSHIP SELF       Abuse Screening:  Abuse Screening Questionnaire 2/16/2021   Do you ever feel afraid of your partner? N   Are you in a relationship with someone who physically or mentally threatens you? N   Is it safe for you to go home? Y       Fall Risk  Fall Risk Assessment, last 12 mths 2/16/2021   Able to walk? Yes   Fall in past 12 months? 1   Do you feel unsteady? 0   Are you worried about falling 0   Is TUG test greater than 12 seconds? 0   Is the gait abnormal? 0   Number of falls in past 12 months 1   Fall with injury? 0       OPIOID RISK TOOL  No flowsheet data found. Pt currently taking Antiplatelet therapy? no    Coordination of Care:  1. Have you been to the ER, urgent care clinic since your last visit? no  Hospitalized since your last visit? no    2. Have you seen or consulted any other health care providers outside of the [address] [place] [address] since your last visit? no Include any pap smears or colon screening.  no

## 2021-02-16 NOTE — PROGRESS NOTES
[facility], [address] [place] [address]  [phone] fax    Continued Plan of Care/ Re-certification for Physical Therapy Services      Patient name: Juan Carlos Gomes Start of Care: 1/15/2021   Referral source: Shankar Willoughby MD : 1945   Medical/Treatment Diagnosis: Neck pain [M54.2]  Right shoulder pain [M25.511]  Left shoulder pain [M25.512]  Payor: VA MEDICARE / Plan: VA MEDICARE PART A & B / Product Type: Medicare /  Onset Date:2020     Prior Hospitalization: see medical history Provider#: [id number]   Medications: Verified on Patient Summary List    Comorbidities:  HTN, hx cancer  Prior Level of Function: Independent with ADLs, functional and household activities with no limitations. Visits from Start of Care: 9    Missed Visits: 0    The Plan of Care and following information is based on the patient's current status:    Short Term Goals: To be accomplished in 1 week:  1. Pt will report compliance and independence to HEP to help the pt manage their pain and symptoms.           Eval: established  Current status: met - Pt reports compliance with HEP  Long Term Goals: To be accomplished in 10 treatments:  1. Pt will increase FOTO score to 73 points to improve ability to perform ADLs. Eval: 63 points  Current status: progressing - FOTO 69 pts  2. Pt will increase AROM left shoulder flex and ABD to 100 degs in sitting to improve ability to perform household activities.   Eval: flex 78 degs, ABD 64 degs (both with pain in anterior shoulder and upper UE)  Current status: met - Flex 140 deg, Scaption 143 deg  3. Pt will increase AROM right shoulder flex to 150 degs to improve ability to reach into a kitchen cabinet with less pain. Eval: 132 degs with discomfort. Current status: progressing - Flex 145 deg, Scaption 155 deg  4.  Pt will report being able to return to yard work and house work activities with minimal to no increased pain in B shoulders to improve independence with household tasks.   Eval: cannot do full yard and house work activities because of pain and limited B shoulders. Current status: progressing - painted inside house with no pain, but no outside work    Key functional changes:  Pt has attended skilled therapy consistently for 9 treatment sessions to address neck and B shoulder pain, left > right. Pt has made good progress thus far with therapy measures. At time of last visit, Pt reported the following:  Functional Gains: Pt reports less pain overall of 0-1/10 on average at sessions, was able to paint the inside of his home without issue, and is still able to play tennis 3x/week  Functional Deficits: Pt is cautious with lifitng/carrying objects, keeping items close to the body, but still moves items about 20-25# around as needed    Pt has just finished steroid pack and would benefit from continued skilled therapy to further improve mobility and strength in B shoulders and ensure ability to perform functional tasks inside and outside of home without need for pain medication. Problems/ barriers to goal attainment: None     Problem List: pain affecting function, decrease ROM, decrease strength, edema affecting function, impaired gait/ balance, decrease ADL/ functional abilitiies, decrease activity tolerance, decrease flexibility/ joint mobility and decrease transfer abilities    Treatment Plan: Therapeutic exercise, Therapeutic activities, Neuromuscular re-education, Physical agent/modality, Gait/balance training, Manual therapy, Aquatic therapy, Patient education, Functional mobility training     Patient Goal (s) has been updated and includes: \"Return to normal use of the arms. \"     Goals for this certification period to be accomplished in 4 weeks:  Goal: Pt will increase FOTO score to 73 points to improve ability to perform ADLs.   Status at last note/certification:FOTO 69 pts  Current status:  Goal: Pt will increase AROM B shoulder flex and left shoulder ABD to 150 degs in sitting to increase ease of overhead activities without difficulty.   Status at last note/certification:  Flex 140 deg left, 145 deg right; Scaption 143 deg left  Current status:   Goal: Pt will perform floor to waist and waist to shoulder height lifts of 30# with proper body mechanics and no pain for ease of carrying objects. Status at last note/certification: new goal  Current status:   Goal: Pt will report being able to return to yard work and house work activities with minimal to no increased pain in B shoulders to improve independence with household tasks.   Status at last note/certification: painted inside house with no pain, but no outside work at this time  Current status:     Frequency / Duration: Patient to be seen 2 times per week for 4 weeks:    Assessment / Recommendations: Pt would benefit from continued skilled therapy to further improve B shoulder AROM and strength for ease of performing functional tasks in home, outside of home without increased pain. Certification Period: 2/13/21 - 3/15/21    Alisha Lizarraga, PT 2/16/2021 8:55 AM    ________________________________________________________________________  I certify that the above Therapy Services are being furnished while the patient is under my care. I agree with the treatment plan and certify that this therapy is necessary. [] I have read the above and request that my patient continue as recommended.   [] I have read the above report and request that my patient continue therapy with the following changes/special instructions: ______________________________________  [] I have read the above report and request that my patient be discharged from therapy    Physician's Signature:____________Date:_________TIME:________    ** Signature, Date and Time must be completed for valid certification **    Please sign and return to [facility] [place] [address] [facility] [address], [address] [place] [address]  [phone] fax

## 2021-02-16 NOTE — PATIENT INSTRUCTIONS
Neck Arthritis: Exercises Introduction Here are some examples of exercises for you to try. The exercises may be suggested for a condition or for rehabilitation. Start each exercise slowly. Ease off the exercises if you start to have pain. You will be told when to start these exercises and which ones will work best for you. How to do the exercises Neck stretches to the side 1. This stretch works best if you keep your shoulder down as you lean away from it. To help you remember to do this, start by relaxing your shoulders and lightly holding on to your thighs or your chair. 2. Tilt your head toward your shoulder and hold for 15 to 30 seconds. Let the weight of your head stretch your muscles. 3. Repeat 2 to 4 times toward each shoulder. Chin tuck 1. Lie on the floor with a rolled-up towel under your neck. Your head should be touching the floor. 2. Slowly bring your chin toward your chest. 
3. Hold for a count of 6, and then relax for up to 10 seconds. 4. Repeat 8 to 12 times. Active cervical rotation 1. Sit in a firm chair, or stand up straight. 2. Keeping your chin level, turn your head to the right, and hold for 15 to 30 seconds. 3. Turn your head to the left and hold for 15 to 30 seconds. 4. Repeat 2 to 4 times to each side. Shoulder blade squeeze 1. While standing, squeeze your shoulder blades together. 2. Do not raise your shoulders up as you are squeezing. 3. Hold for 6 seconds. 4. Repeat 8 to 12 times. Shoulder rolls 1. Sit comfortably with your feet shoulder-width apart. You can also do this exercise standing up. 2. Roll your shoulders up, then back, and then down in a smooth, circular motion. 3. Repeat 2 to 4 times. Follow-up care is a key part of your treatment and safety. Be sure to make and go to all appointments, and call your doctor if you are having problems. It's also a good idea to know your test results and keep a list of the medicines you take. Where can you learn more? Go to http://www.gray.com/ Enter G186 in the search box to learn more about \"Neck Arthritis: Exercises. \" Current as of: March 2, 2020               Content Version: 12.6 © 4242-7908 [employer]. Care instructions adapted under license by [employer] (which disclaims liability or warranty for this information). If you have questions about a medical condition or this instruction, always ask your healthcare professional. [address] any warranty or liability for your use of this information. Shoulder Arthritis: Exercises Introduction Here are some examples of exercises for you to try. The exercises may be suggested for a condition or for rehabilitation. Start each exercise slowly. Ease off the exercises if you start to have pain. You will be told when to start these exercises and which ones will work best for you. How to do the exercises Shoulder flexion (lying down) To make a wand for this exercise, use a piece of PVC pipe or a broom handle with the broom removed. Make the wand about a foot wider than your shoulders. 4. Lie on your back, holding a wand with both hands. Your palms should face down as you hold the wand. 5. Keeping your elbows straight, slowly raise your arms over your head. Raise them until you feel a stretch in your shoulders, upper back, and chest. 
6. Hold for 15 to 30 seconds. 7. Repeat 2 to 4 times. Shoulder rotation (lying down) To make a wand for this exercise, use a piece of PVC pipe or a broom handle with the broom removed. Make the wand about a foot wider than your shoulders. 5. Lie on your back. Hold a wand with both hands with your elbows bent and palms up. 6. Keep your elbows close to your body, and move the wand across your body toward the sore arm. 7. Hold for 8 to 12 seconds. 8. Repeat 2 to 4 times. Shoulder internal rotation with towel 5. Hold a towel above and behind your head with the arm that is not sore. 6. With your sore arm, reach behind your back and grasp the towel. 7. With the arm above your head, pull the towel upward. Do this until you feel a stretch on the front and outside of your sore shoulder. 8. Hold 15 to 30 seconds. 9. Repeat 2 to 4 times. Shoulder blade squeeze 5. Stand with your arms at your sides, and squeeze your shoulder blades together. Do not raise your shoulders up as you squeeze. 6. Hold 6 seconds. 7. Repeat 8 to 12 times. Resisted rows For this exercise, you will need elastic exercise material, such as surgical tubing or Thera-Band. 4. Put the band around a solid object at about waist level. (A bedpost will work well.) Each hand should hold an end of the band. 5. With your elbows at your sides and bent to 90 degrees, pull the band back. Your shoulder blades should move toward each other. Return to the starting position. 6. Repeat 8 to 12 times. External rotator strengthening exercise 1. Start by tying a piece of elastic exercise material to a doorknob. You can use surgical tubing or Thera-Band. (You may also hold one end of the band in each hand.) 2. Stand or sit with your shoulder relaxed and your elbow bent 90 degrees. Your upper arm should rest comfortably against your side. Squeeze a rolled towel between your elbow and your body for comfort. This will help keep your arm at your side. 3. Hold one end of the elastic band with the hand of the painful arm. 4. Start with your forearm across your belly. Slowly rotate the forearm out away from your body. Keep your elbow and upper arm tucked against the towel roll or the side of your body until you begin to feel tightness in your shoulder. Slowly move your arm back to where you started. 5. Repeat 8 to 12 times. Internal rotator strengthening exercise 1. Start by tying a piece of elastic exercise material to a doorknob. You can use surgical tubing or Thera-Band. 2. Stand or sit with your shoulder relaxed and your elbow bent 90 degrees. Your upper arm should rest comfortably against your side. Squeeze a rolled towel between your elbow and your body for comfort. This will help keep your arm at your side. 3. Hold one end of the elastic band in the hand of the painful arm. 4. Slowly rotate your forearm toward your body until it touches your belly. Slowly move it back to where you started. 5. Keep your elbow and upper arm firmly tucked against the towel roll or at your side. 6. Repeat 8 to 12 times. Pendulum swing If you have pain in your back, do not do this exercise. 1. Hold on to a table or the back of a chair with your good arm. Then bend forward a little and let your sore arm hang straight down. This exercise does not use the arm muscles. Rather, use your legs and your hips to create movement that makes your arm swing freely. 2. Use the movement from your hips and legs to guide the slightly swinging arm back and forth like a pendulum (or elephant trunk). Then guide it in circles that start small (about the size of a dinner plate). Make the circles a bit larger each day, as your pain allows. 3. Do this exercise for 5 minutes, 5 to 7 times each day. 4. As you have less pain, try bending over a little farther to do this exercise. This will increase the amount of movement at your shoulder. Follow-up care is a key part of your treatment and safety. Be sure to make and go to all appointments, and call your doctor if you are having problems. It's also a good idea to know your test results and keep a list of the medicines you take. Where can you learn more? Go to http://www.gray.com/ Enter H562 in the search box to learn more about \"Shoulder Arthritis: Exercises. \" 
 Current as of: March 2, 2020               Content Version: 12.6 © 0352-2834 [employer]. Care instructions adapted under license by [employer] (which disclaims liability or warranty for this information). If you have questions about a medical condition or this instruction, always ask your healthcare professional. [address] any warranty or liability for your use of this information.

## 2021-03-01 NOTE — PROGRESS NOTES
[facility]  [facility] [address] [address]  [phone] fax    Discharge Summary    Patient name: Oli Cintron Start of Care: 1/15/2021   Referral source: Liliam Colbert MD : 1945   Medical/Treatment Diagnosis: Neck pain [M54.2]  Right shoulder pain [M25.511]  Left shoulder pain [M25.512]  Payor: [place] MEDICARE / Plan: [place] MEDICARE PART A & B / Product Type: Medicare /  Onset Date:2020      Prior Hospitalization: see medical history Provider#: [id number]   Medications: Verified on Patient Summary List     Comorbidities:  HTN, hx cancer  Prior Level of Function: Independent with ADLs, functional and household activities with no limitations. Visits from Start of Care: 10    Missed Visits: 0    Reporting Period : 2021 to 3/1/2021    Goal: Pt will increase FOTO score to 73 points to improve ability to perform ADLs. Status at last note/certification:FOTO 69 pts  Current status: not met - unable to reassess  Goal: Pt will increase AROM B shoulder flex and left shoulder ABD to 150 degs in sitting to increase ease of overhead activities without difficulty.   Status at last note/certification:  Flex 140 deg left, 145 deg right; Scaption 143 deg left  Current status: not met - unable to reassess  Goal: Pt will perform floor to waist and waist to shoulder height lifts of 30# with proper body mechanics and no pain for ease of carrying objects.   Status at last note/certification: new goal  Current status: not met - unable to reassess  Goal: Pt will report being able to return to yard work and house work activities with minimal to no increased pain in B shoulders to improve independence with household tasks.   Status at last note/certification: painted inside house with no pain, but no outside work at this time  Current status: not met - unable to reassess    Assessment/ Summary of Care: Pt attended initial evaluation and 9 treatment sessions to address B shoulder pain, left > right. Pt made good progress, reporting 0/10 pain on average at each visit and exhibiting improved strength without increased discomfort. Pt did not return after 2/16/21 appointment, due to awaiting instructions upon MD follow up. Pt called on 2/25/21, and requested to be discharged as he had no structural damage to shoulders and he was compliant with HEP and sleeping well. Thus, Pt will be discharged at this time with no further instructions.   Thank you for this referral.    RECOMMENDATIONS:  [x]Discontinue therapy: [x]Patient has reached or is progressing toward set goals      []Patient is non-compliant or has abdicated      []Due to lack of appreciable progress towards set goals    Jd Lizarraga, PT 3/1/2021 12:53 PM

## 2021-03-29 NOTE — PROGRESS NOTES
[facility]  [address] [place], [address]  Phone: [phone]  Fax: [phone]    Pt's YOB: 1945    ASSESSMENT   Diagnoses and all orders for this visit:    1. Cervical spinal stenosis    2. PMR (polymyalgia rheumatica) (HCC)  -     predniSONE (DELTASONE) 2.5 mg tablet; Take 1 tab by mouth daily (with food) as directed. -     predniSONE (DELTASONE) 5 mg tablet; Take 1 tab by mouth daily (with food) as directed. 3. Proximal muscle weakness    4. Muscle spasm    5. Primary osteoarthritis of shoulders, bilateral         IMPRESSION AND PLAN:  Sueanne Goodpasture is a 67 y.o. male with history of cervical and shoulder pain. He reports significant improvement in his pain since taking prednisone. Pt decreased the prednisone from 10 mg to 5 mg daily about 5 weeks ago without any change in symptoms. 1) Pt was given information on cervical and shoulder exercises. 2) He continue taking prednisone 5 mg 1 tab daily for 1 month and will then decrease to prednisone 2.5 mg 1 tab daily. 3) Mr. Alanna Perez has a reminder for a \"due or due soon\" health maintenance. I have asked that he contact his primary care provider, Renetta Serrano MD, for follow-up on this health maintenance. 4)  demonstrated consistency with prescribing. 5) Pt will notify the office if his pain worsens or he has increased weakness when he decreases the dosage of the prednisone  Follow-up and Dispositions    · Return in about 3 months (around 6/30/2021) for Medication follow up. HISTORY OF PRESENT ILLNESS:  Sueanne Goodpasture is a 67 y.o. male with history of cervical and shoulder pain and presents to the office today for follow up. Pt also complains of aching pain and stiffness in the left shoulder upon waking which generally improves throughout the day. He reports significant improvement in his pain since taking prednisone.  Pt notes that he decreased the prednisone from 10 mg to 5 mg daily about 5 weeks ago. He denies any change in his symptoms when decreasing the predinsone. Pt also decreased his Neurontin 300 mg to 1 cap QHS. Pt at this time desires to proceed with medication evaluation.     Pain Scale: 7/10    PCP: Wagner Giron MD     Past Medical History:   Diagnosis Date    Bladder cancer ([facility] [address] 02/24/2016    Elevated prostate specific antigen (PSA)     Essential hypertension     Hiatal hernia     Nodular prostate         Social History     Socioeconomic History    Marital status:      Spouse name: Not on file    Number of children: Not on file    Years of education: Not on file    Highest education level: Not on file   Occupational History    Not on file   Social Needs    Financial resource strain: Not on file    Food insecurity     Worry: Not on file     Inability: Not on file    Transportation needs     Medical: Not on file     Non-medical: Not on file   Tobacco Use    Smoking status: Never Smoker    Smokeless tobacco: Never Used   Substance and Sexual Activity    Alcohol use: Yes     Comment: OCASSIONAL-WINE    Drug use: No    Sexual activity: Yes   Lifestyle    Physical activity     Days per week: Not on file     Minutes per session: Not on file    Stress: Not on file   Relationships    Social connections     Talks on phone: Not on file     Gets together: Not on file     Attends Gnosticism service: Not on file     Active member of club or organization: Not on file     Attends meetings of clubs or organizations: Not on file     Relationship status: Not on file    Intimate partner violence     Fear of current or ex partner: Not on file     Emotionally abused: Not on file     Physically abused: Not on file     Forced sexual activity: Not on file   Other Topics Concern    Not on file   Social History Narrative    Not on file       Current Outpatient Medications   Medication Sig Dispense Refill    predniSONE (DELTASONE) 2.5 mg tablet Take 1 tab by mouth daily (with food) as directed. 30 Tab 1    predniSONE (DELTASONE) 5 mg tablet Take 1 tab by mouth daily (with food) as directed. 30 Tab 0    finasteride (Proscar) 5 mg tablet Take 1 Tab by mouth daily. 90 Tab 2    gabapentin (NEURONTIN) 300 mg capsule Take 300 mg by mouth daily.  meclizine (ANTIVERT) 25 mg tablet Take 1 tablet by mouth every 6 hours for the next 3 days. Then stop taking the meclizine. Restart the meclizine for 3 day intervals if vertigo/ dizziness returns. 20 Tab 0    latanoprost (XALATAN) 0.005 % ophthalmic solution Administer 1 Drop to left eye nightly.  omeprazole (PRILOSEC) 10 mg capsule Take 10 mg by mouth daily.  lisinopril (PRINIVIL, ZESTRIL) 5 mg tablet Take  by mouth daily.  baclofen (LIORESAL) 10 mg tablet Take 1 Tab by mouth two (2) times daily as needed.  famotidine (PEPCID) 20 mg tablet Take 1 Tab by mouth two (2) times daily as needed.  torsemide (DEMADEX) 5 mg tablet Take 1 Tab by mouth daily.  simvastatin (ZOCOR) 20 mg tablet       ketorolac (ACULAR) 0.5 % ophthalmic solution       tamsulosin (FLOMAX) 0.4 mg capsule One cap q d pc (Patient not taking: Reported on 1/11/2021) 90 Cap 3       No Known Allergies      REVIEW OF SYSTEMS    Constitutional: Negative for fever, chills, or weight change. Respiratory: Negative for cough or shortness of breath. Cardiovascular: Negative for chest pain or palpitations. Gastrointestinal: Negative for acid reflux, change in bowel habits, or constipation. Genitourinary: Negative for dysuria and flank pain. Musculoskeletal: Positive for cervical and bilateral shoulder pain. Neurological: Negative for headaches, dizziness, or numbness. Endo/Heme/Allergies: Negative for increased bruising. Psychiatric/Behavioral: Negative for difficulty with sleep.     As per HPI    PHYSICAL EXAMINATION  Visit Vitals  /87   Pulse 66   Temp 97.5 °F (36.4 °C) (Temporal)   Resp 16   Ht 5' 11\" (1.803 m)   Wt 191 lb 6.4 oz (86.8 kg)   SpO2 94%   BMI 26.69 kg/m²       Constitutional: Awake, alert, and in no acute distress. Neurological: 1+ symmetrical DTRs in the upper extremities. 1+ symmetrical DTRs in the lower extremities. Sensation to light touch is intact. Negative Trimble's sign bilaterally. Skin: warm, dry, and intact. Musculoskeletal: Good range of motion in both shoulders. No pain with extension, axial loading, or forward flexion. No pain with internal or external rotation of his hips. Negative straight leg raise bilaterally. Biceps  Triceps Deltoids Wrist Ext Wrist Flex Hand Intrin   Right +4/5 +4/5 +4/5 +4/5 +4/5 +4/5   Left +4/5 +4/5 +4/5 +4/5 +4/5 +4/5      Hip Flex  Quads Hamstrings Ankle DF EHL Ankle PF   Right +4/5 +4/5 +4/5 +4/5 +4/5 +4/5   Left +4/5 +4/5 +4/5 +4/5 +4/5 +4/5     IMAGING:    Right shoulder x-rays from 1/21/2021 were personally reviewed with the patient and demonstrated:  FINDINGS: Frontal and internal rotation views of the right shoulder obtained. No  acute fracture or subluxation identified. Glenohumeral joint maintained. Minimal  greater tuberosity spurring and minimal lateral acromion downsloping which can  be associated with subacromial rotator cuff impingement. Mild AC joint  degenerative change.     IMPRESSION:     No clearly acute findings. Mild degenerative/chronic changes as above.     Left shoulder x-rays from 1/21/2021 were personally reviewed with the patient and demonstrated:  FINDINGS: Internal and external rotation views of the left shoulder obtained. No  acute fracture or subluxation identified. Glenohumeral joint maintained. Mild AC  joint degenerative change. Mild lateral acromion downsloping which may cause  rotator cuff impingement.     IMPRESSION:     No clearly acute findings.  See additional details above.     Cervical spine MRI from 12/23/2020 was personally reviewed with the patient and demonstrated:        Results from [facility] encounter on 12/23/20   MRI CERV SPINE WO CONT     Narrative MRI Cervical Spine      CPT CODE: 41559     INDICATION: Limited cervical range of motion. Cervical spondylosis with  myelopathy.     TECHNIQUE: MRI of the cervical spine performed consisting of sagittal T1, T2 and  inversion recovery sequences with additional axial T2 sequence.      COMPARISON: Plain films 10/1/2014.     FINDINGS:   Sagittal images show mildly straightened cervical lordosis overall, but  otherwise preserved alignment without any significant listhesis. Moderate to severe intervertebral disc space narrowing C5-6 with mild spurring. Mild diffuse disc space narrowing C6-7. No vertebral body compression deformity or edema.     Ovoid well marginated 1.4 cm high T1 and T2 signal lesion within the midline T1  loses signal on inversion recovery, most consistent with hemangioma.     Normal signal in the cervical cord. No expansion or syrinx. Diffuse a lobulated low T2 signal along the midline vertebral bodies to  intervertebral disc levels. Could reflect component of calcification versus  confluence of disc bulge/protrusion. .     Correlation With Axial Images Shows:  C2-3:  Findings suggest mild central disc protrusion, which effaces fluid space  but does not contact the cervical cord. No significant canal narrowing. Mild  right foraminal encroachment due to uncovertebral spur.     C3-4:  Broad-based posterior disc bulge with some endplate osteophytes. Ventral  fluid space is effaced, with questionable possible cord contact. Mild central  canal narrowing to 9 mm. Mild left foraminal encroachment due predominantly to  hypertrophy of the left facet joints.     C4-5:  Irregular broad-based posterior disc bulge with endplate spur complex. This contacts the ventral cervical cord and results in mild distortion midline  and right paramedian. Overall mild to moderate central canal narrowing to 8 mm.   Very mild left foraminal narrowing due to uncovertebral spur predominantly.     C5-6:  Broad-based disc osteophyte complex effaces fluid space and slightly  flattens the ventral cervical cord. No edema. Moderate central canal narrowing  to 8 mm. Moderate to severe right foraminal narrowing due to asymmetric  uncovertebral spur predominantly. Disc bulge may contribute.     C6-7:  Broad-based disc osteophyte complex, with mild central canal narrowing to  9 mm. Questionable cord contact but no distortion. Mild right and left foraminal  narrowing due to uncovertebral spur.     C7-T1:  Minimal disc bulge. No significant canal narrowing. Mild right foraminal  encroachment due to spur.           Impression IMPRESSION:  1.  Preserved vertebral alignment without compression deformity or appreciable  edema.     2. Multilevel central canal stenosis up to moderate degree due to disc  osteophyte complex as above. Mild ventral cord contact and distortion several  levels, without edema. .     3. Varying degrees of bilateral neural foraminal stenosis at several levels as  outlined above.      Left upper extremity EMG from 12/28/2020 was personally reviewed with the patient and demonstrated:  Interpretation Summary: This EMG study, as performed, is suggestive of a chronic multilevel cervical radiculopathy.       Written by Mirela Selby, as dictated by Mendoza Lamar MD.  I, Dr. Mendoza Lamar confirm that all documentation is accurate.

## 2021-03-30 ENCOUNTER — OFFICE VISIT (OUTPATIENT)
Dept: ORTHOPEDIC SURGERY | Age: 76
End: 2021-03-30
Payer: MEDICARE

## 2021-03-30 VITALS
SYSTOLIC BLOOD PRESSURE: 119 MMHG | OXYGEN SATURATION: 94 % | DIASTOLIC BLOOD PRESSURE: 87 MMHG | TEMPERATURE: 97.5 F | HEART RATE: 66 BPM | WEIGHT: 191.4 LBS | BODY MASS INDEX: 26.8 KG/M2 | HEIGHT: 71 IN | RESPIRATION RATE: 16 BRPM

## 2021-03-30 DIAGNOSIS — M35.3 PMR (POLYMYALGIA RHEUMATICA) (HCC): ICD-10-CM

## 2021-03-30 DIAGNOSIS — M19.011 PRIMARY OSTEOARTHRITIS OF SHOULDERS, BILATERAL: ICD-10-CM

## 2021-03-30 DIAGNOSIS — M48.02 CERVICAL SPINAL STENOSIS: Primary | ICD-10-CM

## 2021-03-30 DIAGNOSIS — M62.81 PROXIMAL MUSCLE WEAKNESS: ICD-10-CM

## 2021-03-30 DIAGNOSIS — M62.838 MUSCLE SPASM: ICD-10-CM

## 2021-03-30 DIAGNOSIS — M19.012 PRIMARY OSTEOARTHRITIS OF SHOULDERS, BILATERAL: ICD-10-CM

## 2021-03-30 PROCEDURE — 99213 OFFICE O/P EST LOW 20 MIN: CPT | Performed by: PHYSICAL MEDICINE & REHABILITATION

## 2021-03-30 PROCEDURE — 1101F PT FALLS ASSESS-DOCD LE1/YR: CPT | Performed by: PHYSICAL MEDICINE & REHABILITATION

## 2021-03-30 PROCEDURE — G8536 NO DOC ELDER MAL SCRN: HCPCS | Performed by: PHYSICAL MEDICINE & REHABILITATION

## 2021-03-30 PROCEDURE — G8427 DOCREV CUR MEDS BY ELIG CLIN: HCPCS | Performed by: PHYSICAL MEDICINE & REHABILITATION

## 2021-03-30 PROCEDURE — G8510 SCR DEP NEG, NO PLAN REQD: HCPCS | Performed by: PHYSICAL MEDICINE & REHABILITATION

## 2021-03-30 PROCEDURE — G8419 CALC BMI OUT NRM PARAM NOF/U: HCPCS | Performed by: PHYSICAL MEDICINE & REHABILITATION

## 2021-03-30 RX ORDER — PREDNISONE 2.5 MG/1
TABLET ORAL
Qty: 30 TAB | Refills: 1 | Status: SHIPPED | OUTPATIENT
Start: 2021-03-30 | End: 2021-07-12 | Stop reason: SDUPTHER

## 2021-03-30 RX ORDER — PREDNISONE 5 MG/1
TABLET ORAL
Qty: 30 TAB | Refills: 0 | Status: SHIPPED | OUTPATIENT
Start: 2021-03-30 | End: 2021-07-12 | Stop reason: ALTCHOICE

## 2021-03-30 NOTE — LETTER
3/31/2021    Patient: Ioana Gunn   YOB: 1945   Date of Visit: 3/30/2021     Stevo Fontanez MD  [address] [address]  Via Fax: [phone]    Dear Stevo Fontanez MD,      Thank you for referring Mr. Griffin Hernandez to [facility] for evaluation. My notes for this consultation are attached. If you have questions, please do not hesitate to call me. I look forward to following your patient along with you.       Sincerely,    Yana Quintana MD

## 2021-03-30 NOTE — PATIENT INSTRUCTIONS
Neck Arthritis: Exercises Introduction Here are some examples of exercises for you to try. The exercises may be suggested for a condition or for rehabilitation. Start each exercise slowly. Ease off the exercises if you start to have pain. You will be told when to start these exercises and which ones will work best for you. How to do the exercises Neck stretches to the side 1. This stretch works best if you keep your shoulder down as you lean away from it. To help you remember to do this, start by relaxing your shoulders and lightly holding on to your thighs or your chair. 2. Tilt your head toward your shoulder and hold for 15 to 30 seconds. Let the weight of your head stretch your muscles. 3. Repeat 2 to 4 times toward each shoulder. Chin tuck 1. Lie on the floor with a rolled-up towel under your neck. Your head should be touching the floor. 2. Slowly bring your chin toward your chest. 
3. Hold for a count of 6, and then relax for up to 10 seconds. 4. Repeat 8 to 12 times. Active cervical rotation 1. Sit in a firm chair, or stand up straight. 2. Keeping your chin level, turn your head to the right, and hold for 15 to 30 seconds. 3. Turn your head to the left and hold for 15 to 30 seconds. 4. Repeat 2 to 4 times to each side. Shoulder blade squeeze 1. While standing, squeeze your shoulder blades together. 2. Do not raise your shoulders up as you are squeezing. 3. Hold for 6 seconds. 4. Repeat 8 to 12 times. Shoulder rolls 1. Sit comfortably with your feet shoulder-width apart. You can also do this exercise standing up. 2. Roll your shoulders up, then back, and then down in a smooth, circular motion. 3. Repeat 2 to 4 times. Follow-up care is a key part of your treatment and safety. Be sure to make and go to all appointments, and call your doctor if you are having problems. It's also a good idea to know your test results and keep a list of the medicines you take.  
Where can you learn more? Go to http://www.gray.com/ Enter N456 in the search box to learn more about \"Neck Arthritis: Exercises. \" Current as of: March 2, 2020               Content Version: 12.6 © 1729-3835 [employer]. Care instructions adapted under license by [employer] (which disclaims liability or warranty for this information). If you have questions about a medical condition or this instruction, always ask your healthcare professional. [address] any warranty or liability for your use of this information. Shoulder Arthritis: Exercises Introduction Here are some examples of exercises for you to try. The exercises may be suggested for a condition or for rehabilitation. Start each exercise slowly. Ease off the exercises if you start to have pain. You will be told when to start these exercises and which ones will work best for you. How to do the exercises Shoulder flexion (lying down) To make a wand for this exercise, use a piece of PVC pipe or a broom handle with the broom removed. Make the wand about a foot wider than your shoulders. 4. Lie on your back, holding a wand with both hands. Your palms should face down as you hold the wand. 5. Keeping your elbows straight, slowly raise your arms over your head. Raise them until you feel a stretch in your shoulders, upper back, and chest. 
6. Hold for 15 to 30 seconds. 7. Repeat 2 to 4 times. Shoulder rotation (lying down) To make a wand for this exercise, use a piece of PVC pipe or a broom handle with the broom removed. Make the wand about a foot wider than your shoulders. 5. Lie on your back. Hold a wand with both hands with your elbows bent and palms up. 6. Keep your elbows close to your body, and move the wand across your body toward the sore arm. 7. Hold for 8 to 12 seconds. 8. Repeat 2 to 4 times. Shoulder internal rotation with towel 5.  Hold a towel above and behind your head with the arm that is not sore. 6. With your sore arm, reach behind your back and grasp the towel. 7. With the arm above your head, pull the towel upward. Do this until you feel a stretch on the front and outside of your sore shoulder. 8. Hold 15 to 30 seconds. 9. Repeat 2 to 4 times. Shoulder blade squeeze 5. Stand with your arms at your sides, and squeeze your shoulder blades together. Do not raise your shoulders up as you squeeze. 6. Hold 6 seconds. 7. Repeat 8 to 12 times. Resisted rows For this exercise, you will need elastic exercise material, such as surgical tubing or Thera-Band. 4. Put the band around a solid object at about waist level. (A bedpost will work well.) Each hand should hold an end of the band. 5. With your elbows at your sides and bent to 90 degrees, pull the band back. Your shoulder blades should move toward each other. Return to the starting position. 6. Repeat 8 to 12 times. External rotator strengthening exercise 1. Start by tying a piece of elastic exercise material to a doorknob. You can use surgical tubing or Thera-Band. (You may also hold one end of the band in each hand.) 2. Stand or sit with your shoulder relaxed and your elbow bent 90 degrees. Your upper arm should rest comfortably against your side. Squeeze a rolled towel between your elbow and your body for comfort. This will help keep your arm at your side. 3. Hold one end of the elastic band with the hand of the painful arm. 4. Start with your forearm across your belly. Slowly rotate the forearm out away from your body. Keep your elbow and upper arm tucked against the towel roll or the side of your body until you begin to feel tightness in your shoulder. Slowly move your arm back to where you started. 5. Repeat 8 to 12 times. Internal rotator strengthening exercise 1. Start by tying a piece of elastic exercise material to a doorknob. You can use surgical tubing or Thera-Band. 2. Stand or sit with your shoulder relaxed and your elbow bent 90 degrees. Your upper arm should rest comfortably against your side. Squeeze a rolled towel between your elbow and your body for comfort. This will help keep your arm at your side. 3. Hold one end of the elastic band in the hand of the painful arm. 4. Slowly rotate your forearm toward your body until it touches your belly. Slowly move it back to where you started. 5. Keep your elbow and upper arm firmly tucked against the towel roll or at your side. 6. Repeat 8 to 12 times. Pendulum swing If you have pain in your back, do not do this exercise. 1. Hold on to a table or the back of a chair with your good arm. Then bend forward a little and let your sore arm hang straight down. This exercise does not use the arm muscles. Rather, use your legs and your hips to create movement that makes your arm swing freely. 2. Use the movement from your hips and legs to guide the slightly swinging arm back and forth like a pendulum (or elephant trunk). Then guide it in circles that start small (about the size of a dinner plate). Make the circles a bit larger each day, as your pain allows. 3. Do this exercise for 5 minutes, 5 to 7 times each day. 4. As you have less pain, try bending over a little farther to do this exercise. This will increase the amount of movement at your shoulder. Follow-up care is a key part of your treatment and safety. Be sure to make and go to all appointments, and call your doctor if you are having problems. It's also a good idea to know your test results and keep a list of the medicines you take. Where can you learn more? Go to http://www.gray.com/ Enter H562 in the search box to learn more about \"Shoulder Arthritis: Exercises. \" Current as of: March 2, 2020               Content Version: 12.6 © 6131-2796 [employer].   
Care instructions adapted under license by [employer] (which disclaims liability or warranty for this information). If you have questions about a medical condition or this instruction, always ask your healthcare professional. [employer] disclaims any warranty or liability for your use of this information.

## 2021-03-30 NOTE — PROGRESS NOTES
Chelita Orozco presents today for   Chief Complaint   Patient presents with    Neck Pain    Shoulder Pain     let    Follow-up       Is someone accompanying this pt? No    Is the patient using any DME equipment during OV? No    Depression Screening:  3 most recent PHQ Screens 3/30/2021   Little interest or pleasure in doing things Not at all   Feeling down, depressed, irritable, or hopeless Not at all   Total Score PHQ 2 0       Learning Assessment:  Learning Assessment 4/1/2016   PRIMARY LEARNER Patient   BARRIERS PRIMARY LEARNER [address] CAREGIVER No   PRIMARY LANGUAGE ENGLISH   LEARNER PREFERENCE PRIMARY READING   ANSWERED BY patient   RELATIONSHIP SELF       Abuse Screening:  Abuse Screening Questionnaire 2/16/2021   Do you ever feel afraid of your partner? N   Are you in a relationship with someone who physically or mentally threatens you? N   Is it safe for you to go home? Y       Fall Risk  Fall Risk Assessment, last 12 mths 3/30/2021   Able to walk? Yes   Fall in past 12 months? 0   Do you feel unsteady? 0   Are you worried about falling 0   Is TUG test greater than 12 seconds? -   Is the gait abnormal? -   Number of falls in past 12 months -   Fall with injury? -       OPIOID RISK TOOL  No flowsheet data found. Pt currently taking Antiplatelet therapy? No    Coordination of Care:  1. Have you been to the ER, urgent care clinic since your last visit? No  Hospitalized since your last visit? No    2. Have you seen or consulted any other health care providers outside of the [address] [place] [address] since your last visit? No Include any pap smears or colon screening.  No

## 2021-07-12 ENCOUNTER — OFFICE VISIT (OUTPATIENT)
Dept: ORTHOPEDIC SURGERY | Age: 76
End: 2021-07-12
Payer: MEDICARE

## 2021-07-12 VITALS
OXYGEN SATURATION: 95 % | RESPIRATION RATE: 18 BRPM | TEMPERATURE: 97.1 F | DIASTOLIC BLOOD PRESSURE: 97 MMHG | SYSTOLIC BLOOD PRESSURE: 155 MMHG | BODY MASS INDEX: 26.36 KG/M2 | HEART RATE: 81 BPM | WEIGHT: 189 LBS

## 2021-07-12 DIAGNOSIS — M62.838 MUSCLE SPASM: ICD-10-CM

## 2021-07-12 DIAGNOSIS — R03.0 ELEVATED BLOOD PRESSURE READING: ICD-10-CM

## 2021-07-12 DIAGNOSIS — M19.012 PRIMARY OSTEOARTHRITIS OF SHOULDERS, BILATERAL: ICD-10-CM

## 2021-07-12 DIAGNOSIS — M48.02 CERVICAL SPINAL STENOSIS: ICD-10-CM

## 2021-07-12 DIAGNOSIS — M35.3 PMR (POLYMYALGIA RHEUMATICA) (HCC): Primary | ICD-10-CM

## 2021-07-12 DIAGNOSIS — M62.81 PROXIMAL MUSCLE WEAKNESS: ICD-10-CM

## 2021-07-12 DIAGNOSIS — M19.011 PRIMARY OSTEOARTHRITIS OF SHOULDERS, BILATERAL: ICD-10-CM

## 2021-07-12 PROCEDURE — G8432 DEP SCR NOT DOC, RNG: HCPCS | Performed by: PHYSICAL MEDICINE & REHABILITATION

## 2021-07-12 PROCEDURE — 99214 OFFICE O/P EST MOD 30 MIN: CPT | Performed by: PHYSICAL MEDICINE & REHABILITATION

## 2021-07-12 PROCEDURE — 1101F PT FALLS ASSESS-DOCD LE1/YR: CPT | Performed by: PHYSICAL MEDICINE & REHABILITATION

## 2021-07-12 PROCEDURE — G8427 DOCREV CUR MEDS BY ELIG CLIN: HCPCS | Performed by: PHYSICAL MEDICINE & REHABILITATION

## 2021-07-12 PROCEDURE — G8536 NO DOC ELDER MAL SCRN: HCPCS | Performed by: PHYSICAL MEDICINE & REHABILITATION

## 2021-07-12 PROCEDURE — G8419 CALC BMI OUT NRM PARAM NOF/U: HCPCS | Performed by: PHYSICAL MEDICINE & REHABILITATION

## 2021-07-12 RX ORDER — PREDNISONE 2.5 MG/1
TABLET ORAL
Qty: 45 TABLET | Refills: 2 | Status: SHIPPED | OUTPATIENT
Start: 2021-07-12 | End: 2021-10-11 | Stop reason: SDUPTHER

## 2021-07-12 NOTE — PROGRESS NOTES
[facility]  [address] [place], [address]  Phone: [phone]  Fax: [phone]    Pt's YOB: 1945    ASSESSMENT   Diagnoses and all orders for this visit:    1. PMR (polymyalgia rheumatica) (HCC)  -     predniSONE (DELTASONE) 2.5 mg tablet; Take 1-2 tabs by mouth daily (with food) as directed. -     REFERRAL TO RHEUMATOLOGY    2. Cervical spinal stenosis    3. Proximal muscle weakness    4. Elevated blood pressure reading    5. Muscle spasm    6. Primary osteoarthritis of shoulders, bilateral         IMPRESSION AND PLAN:  Jewel Rogers is a 67 y.o. male with history of cervical and shoulder pain. He complains of intermittent muscle spasms and stiffness in his legs. Pt takes prednisone 2.5 mg 1-2 tabs daily as his pain warrants and Neurontin 300 mg 1 cap QHS with benefit. 1) Pt was given information on cervical and shoulder arthritis exercises. 2) I refilled his prednisone 2.5 mg 1-2 tabs daily as directed-- recommend keeping the prednisone to the lowest effective dose. 3) I recommended that pt discuss use of Vitamin D3 and Zinc supplements   4) Mr. Mojgan Krishnamurthy has a reminder for a \"due or due soon\" health maintenance. I have asked that he contact his primary care provider, Silas Fulton MD, for follow-up on this health maintenance. 5)  demonstrated consistency with prescribing. 6) I referred him to Dr. Stas Jon for further evaluation for PMR. 7) Pt will follow up in 3 months      HISTORY OF PRESENT ILLNESS:  Jewel Rogers is a 67 y.o. male with history of cervical and shoulder pain and presents to the office today with his wife for follow up. He states he has continued to take the prednisone with significant improvement in his arm weakness/ pain and thigh weakness.   Pt notes that before he came to the [id number] [place] [address], Dr Darius Jackson had given him a course of prednisone and he had substantial improvement in his arm and thigh weakness and his shoulder pain. He completed the course and after 2-3 weeks, he found his symptoms returning. When he was given another course of prednisone, he noted his symptoms once again improved significantly. He also did have some therapy for his shoulder pain. Labs were also obtained in between courses of prednisone and on 01/27/2021 his CRP was  2.2 and Sed Rate was 27 (both elevated). He was tapered on the prednisone down to 2.5 mg and he has done well overall. He does report intermittent muscle spasms and stiffness in his legs. Pt notes more recent pain in the left leg after he slipped but he denies pain in his legs/feet at this time or upon waking. He had some increased pain recently and temporarily increased his prednisone with significant improvement. Pt also takes Neurontin 300 mg 1 cap QHS with benefit. He admits to very occasional swelling in the legs. Pt notes he received both doses of the COVID-19 vaccination. Pt at this time desires to proceed with a referral to rheumatology.     Pain Scale: 1/10    PCP: Jeison Rodríguez MD     Past Medical History:   Diagnosis Date    Bladder cancer ([facility] [address] 02/24/2016    Elevated prostate specific antigen (PSA)     Essential hypertension     Hiatal hernia     Nodular prostate         Social History     Socioeconomic History    Marital status:      Spouse name: Not on file    Number of children: Not on file    Years of education: Not on file    Highest education level: Not on file   Occupational History    Not on file   Tobacco Use    Smoking status: Never Smoker    Smokeless tobacco: Never Used   Substance and Sexual Activity    Alcohol use: Yes     Comment: OCASSIONAL-WINE    Drug use: No    Sexual activity: Yes   Other Topics Concern    Not on file   Social History Narrative    Not on file     Social Determinants of Health     Financial Resource Strain:     Difficulty of Paying Living Expenses:    Food Insecurity:     Worried About [facility] in the Last Year:    [address] in the Last Year:    Transportation Needs:     Lack of Transportation (Medical):  Lack of Transportation (Non-Medical):    Physical Activity:     Days of Exercise per Week:     Minutes of Exercise per Session:    Stress:     Feeling of Stress :    Social Connections:     Frequency of Communication with Friends and Family:     Frequency of Social Gatherings with Friends and Family:     Attends [employer] Services:     Active Member of Clubs or Organizations:     Attends Club or Organization Meetings:     Marital Status:    Intimate Partner Violence:     Fear of Current or Ex-Partner:     Emotionally Abused:     Physically Abused:     Sexually Abused:        Current Outpatient Medications   Medication Sig Dispense Refill    predniSONE (DELTASONE) 2.5 mg tablet Take 1-2 tabs by mouth daily (with food) as directed. 45 Tablet 2    torsemide (DEMADEX) 5 mg tablet Take 1 Tab by mouth daily.  gabapentin (NEURONTIN) 300 mg capsule Take 300 mg by mouth daily.  omeprazole (PRILOSEC) 10 mg capsule Take 10 mg by mouth daily.  lisinopril (PRINIVIL, ZESTRIL) 5 mg tablet Take  by mouth daily. No Known Allergies      REVIEW OF SYSTEMS    Constitutional: Negative for fever, chills, or weight change. Respiratory: Negative for cough or shortness of breath. Cardiovascular: Negative for chest pain or palpitations. Gastrointestinal: Negative for acid reflux, change in bowel habits, or constipation. Genitourinary: Negative for dysuria and flank pain. Musculoskeletal: Positive for cervical and shoulder pain. Neurological: Negative for headaches, dizziness, or numbness. Endo/Heme/Allergies: Negative for increased bruising. Psychiatric/Behavioral: Negative for difficulty with sleep.     As per HPI    PHYSICAL EXAMINATION  Visit Vitals  BP (!) 155/97 (BP 1 Location: Left upper arm)   Pulse 81   Temp 97.1 °F (36.2 °C) Resp 18   Wt 189 lb (85.7 kg)   SpO2 95%   BMI 26.36 kg/m²       Constitutional: Awake, alert, and in no acute distress. Neurological: Sensation to light touch is intact. Negative Trimble's sign bilaterally. Skin: warm, dry, and intact. Musculoskeletal: Good range of motion in both shoulders. No pain with extension, axial loading, or forward flexion. No pain with internal or external rotation of his hips. Negative straight leg raise bilaterally. Biceps  Triceps Deltoids Wrist Ext Wrist Flex Hand Intrin   Right +4/5 +4/5 +4/5 +4/5 +4/5 +4/5   Left +4/5 +4/5 +4/5 +4/5 +4/5 +4/5      Hip Flex  Quads Hamstrings Ankle DF EHL Ankle PF   Right +4/5 +4/5 +4/5 +4/5 +4/5 +4/5   Left +4/5 +4/5 +4/5 +4/5 +4/5 +4/5     IMAGING:    Right shoulder x-rays from 1/21/2021 were personally reviewed with the patient and demonstrated:  FINDINGS: Frontal and internal rotation views of the right shoulder obtained. No  acute fracture or subluxation identified. Glenohumeral joint maintained. Minimal  greater tuberosity spurring and minimal lateral acromion downsloping which can  be associated with subacromial rotator cuff impingement. Mild AC joint  degenerative change.     IMPRESSION:     No clearly acute findings. Mild degenerative/chronic changes as above.     Left shoulder x-rays from 1/21/2021 were personally reviewed with the patient and demonstrated:  FINDINGS: Internal and external rotation views of the left shoulder obtained. No  acute fracture or subluxation identified. Glenohumeral joint maintained. Mild AC  joint degenerative change. Mild lateral acromion downsloping which may cause  rotator cuff impingement.     IMPRESSION:     No clearly acute findings.  See additional details above.     Cervical spine MRI from 12/23/2020 was personally reviewed with the patient and demonstrated:          Results from [place] encounter on 12/23/20   MRI CERV SPINE WO CONT     Narrative MRI Cervical Spine      CPT CODE: 42585     INDICATION: Limited cervical range of motion. Cervical spondylosis with  myelopathy.     TECHNIQUE: MRI of the cervical spine performed consisting of sagittal T1, T2 and  inversion recovery sequences with additional axial T2 sequence.      COMPARISON: Plain films 10/1/2014.     FINDINGS:   Sagittal images show mildly straightened cervical lordosis overall, but  otherwise preserved alignment without any significant listhesis. Moderate to severe intervertebral disc space narrowing C5-6 with mild spurring. Mild diffuse disc space narrowing C6-7. No vertebral body compression deformity or edema.     Ovoid well marginated 1.4 cm high T1 and T2 signal lesion within the midline T1  loses signal on inversion recovery, most consistent with hemangioma.     Normal signal in the cervical cord. No expansion or syrinx. Diffuse a lobulated low T2 signal along the midline vertebral bodies to  intervertebral disc levels. Could reflect component of calcification versus  confluence of disc bulge/protrusion. .     Correlation With Axial Images Shows:  C2-3:  Findings suggest mild central disc protrusion, which effaces fluid space  but does not contact the cervical cord. No significant canal narrowing. Mild  right foraminal encroachment due to uncovertebral spur.     C3-4:  Broad-based posterior disc bulge with some endplate osteophytes. Ventral  fluid space is effaced, with questionable possible cord contact. Mild central  canal narrowing to 9 mm. Mild left foraminal encroachment due predominantly to  hypertrophy of the left facet joints.     C4-5:  Irregular broad-based posterior disc bulge with endplate spur complex. This contacts the ventral cervical cord and results in mild distortion midline  and right paramedian. Overall mild to moderate central canal narrowing to 8 mm.   Very mild left foraminal narrowing due to uncovertebral spur predominantly.     C5-6:  Broad-based disc osteophyte complex effaces fluid space and slightly  flattens the ventral cervical cord. No edema. Moderate central canal narrowing  to 8 mm. Moderate to severe right foraminal narrowing due to asymmetric  uncovertebral spur predominantly. Disc bulge may contribute.     C6-7:  Broad-based disc osteophyte complex, with mild central canal narrowing to  9 mm. Questionable cord contact but no distortion. Mild right and left foraminal  narrowing due to uncovertebral spur.     C7-T1:  Minimal disc bulge. No significant canal narrowing. Mild right foraminal  encroachment due to spur.           Impression IMPRESSION:  1.  Preserved vertebral alignment without compression deformity or appreciable  edema.     2. Multilevel central canal stenosis up to moderate degree due to disc  osteophyte complex as above. Mild ventral cord contact and distortion several  levels, without edema. .     3. Varying degrees of bilateral neural foraminal stenosis at several levels as  outlined above.      Left upper extremity EMG from 12/28/2020 was personally reviewed with the patient and demonstrated:  Interpretation Summary: This EMG study, as performed, is suggestive of a chronic multilevel cervical radiculopathy. Written by Irven Alpers, as dictated by Lauren Luna MD.  I, Dr. Lauren Luna confirm that all documentation is accurate.

## 2021-07-12 NOTE — LETTER
7/14/2021    Patient: Diana Hawkins   YOB: 1945   Date of Visit: 7/12/2021     Dinesh Valdez MD  [address] [address]  Via Fax: [phone]    Dear Dinesh Valdez MD,      Thank you for referring Mr. Griffin Ernst to [facility] for evaluation. My notes for this consultation are attached. If you have questions, please do not hesitate to call me. I look forward to following your patient along with you.       Sincerely,    Rafael Billy MD

## 2021-07-12 NOTE — PATIENT INSTRUCTIONS
Neck Arthritis: Exercises  Introduction  Here are some examples of exercises for you to try. The exercises may be suggested for a condition or for rehabilitation. Start each exercise slowly. Ease off the exercises if you start to have pain. You will be told when to start these exercises and which ones will work best for you. How to do the exercises  Neck stretches to the side   1. This stretch works best if you keep your shoulder down as you lean away from it. To help you remember to do this, start by relaxing your shoulders and lightly holding on to your thighs or your chair. 2. Tilt your head toward your shoulder and hold for 15 to 30 seconds. Let the weight of your head stretch your muscles. 3. Repeat 2 to 4 times toward each shoulder. Chin tuck   1. Lie on the floor with a rolled-up towel under your neck. Your head should be touching the floor. 2. Slowly bring your chin toward your chest.  3. Hold for a count of 6, and then relax for up to 10 seconds. 4. Repeat 8 to 12 times. Active cervical rotation   1. Sit in a firm chair, or stand up straight. 2. Keeping your chin level, turn your head to the right, and hold for 15 to 30 seconds. 3. Turn your head to the left and hold for 15 to 30 seconds. 4. Repeat 2 to 4 times to each side. Shoulder blade squeeze   1. While standing, squeeze your shoulder blades together. 2. Do not raise your shoulders up as you are squeezing. 3. Hold for 6 seconds. 4. Repeat 8 to 12 times. Shoulder rolls   1. Sit comfortably with your feet shoulder-width apart. You can also do this exercise standing up. 2. Roll your shoulders up, then back, and then down in a smooth, circular motion. 3. Repeat 2 to 4 times. Follow-up care is a key part of your treatment and safety. Be sure to make and go to all appointments, and call your doctor if you are having problems. It's also a good idea to know your test results and keep a list of the medicines you take.   Where can you learn more? Go to http://www.gray.com/  Enter K157 in the search box to learn more about \"Neck Arthritis: Exercises. \"  Current as of: November 16, 2020               Content Version: 12.8  © 2006-2021 Healthwise, [facility]. Care instructions adapted under license by [employer] (which disclaims liability or warranty for this information). If you have questions about a medical condition or this instruction, always ask your healthcare professional. [address] any warranty or liability for your use of this information. Shoulder Arthritis: Exercises  Introduction  Here are some examples of exercises for you to try. The exercises may be suggested for a condition or for rehabilitation. Start each exercise slowly. Ease off the exercises if you start to have pain. You will be told when to start these exercises and which ones will work best for you. How to do the exercises  Shoulder flexion (lying down)   To make a wand for this exercise, use a piece of PVC pipe or a broom handle with the broom removed. Make the wand about a foot wider than your shoulders. 4. Lie on your back, holding a wand with both hands. Your palms should face down as you hold the wand. 5. Keeping your elbows straight, slowly raise your arms over your head. Raise them until you feel a stretch in your shoulders, upper back, and chest.  6. Hold for 15 to 30 seconds. 7. Repeat 2 to 4 times. Shoulder rotation (lying down)   To make a wand for this exercise, use a piece of PVC pipe or a broom handle with the broom removed. Make the wand about a foot wider than your shoulders. 5. Lie on your back. Hold a wand with both hands with your elbows bent and palms up. 6. Keep your elbows close to your body, and move the wand across your body toward the sore arm. 7. Hold for 8 to 12 seconds. 8. Repeat 2 to 4 times. Shoulder internal rotation with towel   5.  Hold a towel above and behind your head with the arm that is not sore. 6. With your sore arm, reach behind your back and grasp the towel. 7. With the arm above your head, pull the towel upward. Do this until you feel a stretch on the front and outside of your sore shoulder. 8. Hold 15 to 30 seconds. 9. Repeat 2 to 4 times. Shoulder blade squeeze   5. Stand with your arms at your sides, and squeeze your shoulder blades together. Do not raise your shoulders up as you squeeze. 6. Hold 6 seconds. 7. Repeat 8 to 12 times. Resisted rows   For this exercise, you will need elastic exercise material, such as surgical tubing or Thera-Band. 4. Put the band around a solid object at about waist level. (A bedpost will work well.) Each hand should hold an end of the band. 5. With your elbows at your sides and bent to 90 degrees, pull the band back. Your shoulder blades should move toward each other. Return to the starting position. 6. Repeat 8 to 12 times. External rotator strengthening exercise   1. Start by tying a piece of elastic exercise material to a doorknob. You can use surgical tubing or Thera-Band. (You may also hold one end of the band in each hand.)  2. Stand or sit with your shoulder relaxed and your elbow bent 90 degrees. Your upper arm should rest comfortably against your side. Squeeze a rolled towel between your elbow and your body for comfort. This will help keep your arm at your side. 3. Hold one end of the elastic band with the hand of the painful arm. 4. Start with your forearm across your belly. Slowly rotate the forearm out away from your body. Keep your elbow and upper arm tucked against the towel roll or the side of your body until you begin to feel tightness in your shoulder. Slowly move your arm back to where you started. 5. Repeat 8 to 12 times. Internal rotator strengthening exercise   1. Start by tying a piece of elastic exercise material to a doorknob.  You can use surgical tubing or Thera-Band. 2. Stand or sit with your shoulder relaxed and your elbow bent 90 degrees. Your upper arm should rest comfortably against your side. Squeeze a rolled towel between your elbow and your body for comfort. This will help keep your arm at your side. 3. Hold one end of the elastic band in the hand of the painful arm. 4. Slowly rotate your forearm toward your body until it touches your belly. Slowly move it back to where you started. 5. Keep your elbow and upper arm firmly tucked against the towel roll or at your side. 6. Repeat 8 to 12 times. Pendulum swing   If you have pain in your back, do not do this exercise. 1. Hold on to a table or the back of a chair with your good arm. Then bend forward a little and let your sore arm hang straight down. This exercise does not use the arm muscles. Rather, use your legs and your hips to create movement that makes your arm swing freely. 2. Use the movement from your hips and legs to guide the slightly swinging arm back and forth like a pendulum (or elephant trunk). Then guide it in circles that start small (about the size of a dinner plate). Make the circles a bit larger each day, as your pain allows. 3. Do this exercise for 5 minutes, 5 to 7 times each day. 4. As you have less pain, try bending over a little farther to do this exercise. This will increase the amount of movement at your shoulder. Follow-up care is a key part of your treatment and safety. Be sure to make and go to all appointments, and call your doctor if you are having problems. It's also a good idea to know your test results and keep a list of the medicines you take. Where can you learn more? Go to http://www.gray.com/  Enter H562 in the search box to learn more about \"Shoulder Arthritis: Exercises. \"  Current as of: November 16, 2020               Content Version: 12.8  © 9577-0111 Healthwise, [employer].    Care instructions adapted under license by [employer] (which disclaims liability or warranty for this information). If you have questions about a medical condition or this instruction, always ask your healthcare professional. [address] any warranty or liability for your use of this information.

## 2021-08-17 DIAGNOSIS — M79.2 NEURITIS: ICD-10-CM

## 2021-08-17 DIAGNOSIS — M54.12 CERVICAL RADICULOPATHY: Primary | ICD-10-CM

## 2021-08-17 RX ORDER — GABAPENTIN 300 MG/1
300 CAPSULE ORAL DAILY
Qty: 30 CAPSULE | Refills: 3 | Status: SHIPPED | OUTPATIENT
Start: 2021-08-17 | End: 2021-10-11 | Stop reason: SDUPTHER

## 2021-08-17 NOTE — TELEPHONE ENCOUNTER
This med is listed as historical. Please sign if appropriate. Last Visit: 7/12/21 with MD Kay Valencia  Next Appointment: 10/11/21 with MD Kay Valencia    Requested Prescriptions     Pending Prescriptions Disp Refills    gabapentin (NEURONTIN) 300 mg capsule 30 Capsule 1     Sig: Take 1 Capsule by mouth daily. Max Daily Amount: 300 mg.

## 2021-10-11 ENCOUNTER — OFFICE VISIT (OUTPATIENT)
Dept: ORTHOPEDIC SURGERY | Age: 76
End: 2021-10-11
Payer: MEDICARE

## 2021-10-11 VITALS
SYSTOLIC BLOOD PRESSURE: 151 MMHG | TEMPERATURE: 96.1 F | RESPIRATION RATE: 16 BRPM | WEIGHT: 189 LBS | DIASTOLIC BLOOD PRESSURE: 82 MMHG | HEART RATE: 67 BPM | OXYGEN SATURATION: 92 % | HEIGHT: 71 IN | BODY MASS INDEX: 26.46 KG/M2

## 2021-10-11 DIAGNOSIS — M79.2 NEURITIS: ICD-10-CM

## 2021-10-11 DIAGNOSIS — M35.3 PMR (POLYMYALGIA RHEUMATICA) (HCC): Primary | ICD-10-CM

## 2021-10-11 DIAGNOSIS — R03.0 ELEVATED BLOOD PRESSURE READING: ICD-10-CM

## 2021-10-11 DIAGNOSIS — M62.838 MUSCLE SPASM: ICD-10-CM

## 2021-10-11 DIAGNOSIS — M54.12 CERVICAL RADICULOPATHY: ICD-10-CM

## 2021-10-11 PROCEDURE — G8427 DOCREV CUR MEDS BY ELIG CLIN: HCPCS | Performed by: PHYSICAL MEDICINE & REHABILITATION

## 2021-10-11 PROCEDURE — 1101F PT FALLS ASSESS-DOCD LE1/YR: CPT | Performed by: PHYSICAL MEDICINE & REHABILITATION

## 2021-10-11 PROCEDURE — G8536 NO DOC ELDER MAL SCRN: HCPCS | Performed by: PHYSICAL MEDICINE & REHABILITATION

## 2021-10-11 PROCEDURE — G8419 CALC BMI OUT NRM PARAM NOF/U: HCPCS | Performed by: PHYSICAL MEDICINE & REHABILITATION

## 2021-10-11 PROCEDURE — G8432 DEP SCR NOT DOC, RNG: HCPCS | Performed by: PHYSICAL MEDICINE & REHABILITATION

## 2021-10-11 PROCEDURE — 99214 OFFICE O/P EST MOD 30 MIN: CPT | Performed by: PHYSICAL MEDICINE & REHABILITATION

## 2021-10-11 RX ORDER — PREDNISONE 2.5 MG/1
TABLET ORAL
Qty: 30 TABLET | Refills: 2 | Status: SHIPPED | OUTPATIENT
Start: 2021-10-11

## 2021-10-11 RX ORDER — GABAPENTIN 300 MG/1
300 CAPSULE ORAL DAILY
Qty: 90 CAPSULE | Refills: 1 | Status: SHIPPED | OUTPATIENT
Start: 2021-10-11 | End: 2022-03-14 | Stop reason: SDUPTHER

## 2021-10-11 RX ORDER — FINASTERIDE 5 MG/1
5 TABLET, FILM COATED ORAL DAILY
COMMUNITY

## 2021-10-11 NOTE — PROGRESS NOTES
[facility]  [address] [place] [address], [facility] [address]  Phone: [phone]  Fax: [phone]    Pt's YOB: 1945    ASSESSMENT   Diagnoses and all orders for this visit:    1. PMR (polymyalgia rheumatica) (HCC)  -     predniSONE (DELTASONE) 2.5 mg tablet; Take 1 tab by mouth daily (with food) as directed. 2. Neuritis  -     gabapentin (NEURONTIN) 300 mg capsule; Take 1 Capsule by mouth daily. Max Daily Amount: 300 mg.    3. Muscle spasm    4. Cervical radiculopathy  -     gabapentin (NEURONTIN) 300 mg capsule; Take 1 Capsule by mouth daily. Max Daily Amount: 300 mg.    5. Elevated blood pressure reading         IMPRESSION AND PLAN:  Wilian Jerry is a 67 y.o. male with history of cervical and shoulder pain. He is scheduled to follow up with Dr. Ashok Arceo in 11/2021 for further evaluation of polymyalgia rheumatica. Pt continues to take Neurontin 300 mg 1 cap QHS and prednisone 2.5 mg 1 tab daily as his pain warrants with relief. 1) Pt was given information on cervical and shoulder arthritis exercises. 2) He is scheduled to follow up with Dr. Ashok Arceo for further evaluation of PMR. 3) Pt received a refill of prednisone 2.5 mg daily. He will taper down to 1 cap every other day or every 3rd day as tolerated. 4) He received a refill of Neurontin 300 mg 1 cap QHS. 5) Mr. Guerda Guerrero has a reminder for a \"due or due soon\" health maintenance. I have asked that he contact his primary care provider, Garrett Hashimoto, MD, for follow-up on this health maintenance and for his blood pressure  6)  demonstrated consistency with prescribing. Follow-up and Dispositions    · Return in about 5 months (around 3/11/2022) for Medication follow up. HISTORY OF PRESENT ILLNESS:  Wilian Jerry is a 67 y.o. male with history of cervical and shoulder pain and presents to the office today for follow up.  He is scheduled to follow up with  [facility] in 11/2021 for further evaluation of polymyalgia rheumatica. Pt continues to take prednisone 2.5 mg 1 tab daily as his pain warrants with relief. He admits to improvement in the range of motion in his shoulders. Pt notes that he woke up last week with a cold sensation in the right leg. He takes Neurontin 300 mg 1 cap QHS with benefit. Pt at this time desires to continue with current care. Pain Scale: 2/10    PCP: Wagner Giron MD     Past Medical History:   Diagnosis Date    Bladder cancer ([facility] [address] 02/24/2016    Elevated prostate specific antigen (PSA)     Essential hypertension     Hiatal hernia     Nodular prostate         Social History     Socioeconomic History    Marital status:      Spouse name: Not on file    Number of children: Not on file    Years of education: Not on file    Highest education level: Not on file   Occupational History    Not on file   Tobacco Use    Smoking status: Never Smoker    Smokeless tobacco: Never Used   Substance and Sexual Activity    Alcohol use: Yes     Comment: OCASSIONAL-WINE    Drug use: No    Sexual activity: Yes   Other Topics Concern    Not on file   Social History Narrative    Not on file     Social Determinants of Health     Financial Resource Strain:     Difficulty of Paying Living Expenses:    Food Insecurity:     Worried About [address] in the Last Year:     [address] in the Last Year:    Transportation Needs:     Lack of Transportation (Medical):      Lack of Transportation (Non-Medical):    Physical Activity:     Days of Exercise per Week:     Minutes of Exercise per Session:    Stress:     Feeling of Stress :    Social Connections:     Frequency of Communication with Friends and Family:     Frequency of Social Gatherings with Friends and Family:     Attends Hoah[employer] Services:     Active Member of Clubs or Organizations:     Attends Club or Organization Meetings:     Marital Status:    Intimate Partner Violence:     Fear of Current or Ex-Partner:     Emotionally Abused:     Physically Abused:     Sexually Abused:        Current Outpatient Medications   Medication Sig Dispense Refill    finasteride (PROSCAR) 5 mg tablet Take 5 mg by mouth daily.  gabapentin (NEURONTIN) 300 mg capsule Take 1 Capsule by mouth daily. Max Daily Amount: 300 mg. 90 Capsule 1    predniSONE (DELTASONE) 2.5 mg tablet Take 1 tab by mouth daily (with food) as directed. 30 Tablet 2    omeprazole (PRILOSEC) 10 mg capsule Take 10 mg by mouth daily.  lisinopril (PRINIVIL, ZESTRIL) 5 mg tablet Take  by mouth daily.  torsemide (DEMADEX) 5 mg tablet Take 1 Tab by mouth daily. (Patient not taking: Reported on 10/11/2021)         No Known Allergies      REVIEW OF SYSTEMS    Constitutional: Negative for fever, chills, or weight change. Respiratory: Negative for cough or shortness of breath. Cardiovascular: Negative for chest pain or palpitations. Gastrointestinal: Negative for acid reflux, change in bowel habits, or constipation. Genitourinary: Negative for dysuria and flank pain. Musculoskeletal: Positive for cervical and shoulder pain. Skin: Negative for rash. Neurological: Negative for headaches, dizziness, or numbness. Endo/Heme/Allergies: Negative for increased bruising. Psychiatric/Behavioral: Negative for difficulty with sleep. As per HPI    PHYSICAL EXAMINATION  Visit Vitals  BP (!) 151/82 (BP 1 Location: Right arm, BP Patient Position: Sitting, BP Cuff Size: Adult)   Pulse 67   Temp (!) 96.1 °F (35.6 °C) (Tympanic)   Resp 16   Ht 5' 11\" (1.803 m)   Wt 189 lb (85.7 kg)   SpO2 92%   BMI 26.36 kg/m²       Constitutional: Awake, alert, and in no acute distress. Neurological: Sensation to light touch is intact. Skin: warm, dry, and intact. Musculoskeletal: Good range of motion in both shoulders. No pain with extension, axial loading, or forward flexion.  No pain with internal or external rotation of his hips. Negative straight leg raise bilaterally. Biceps  Triceps Deltoids Wrist Ext Wrist Flex Hand Intrin   Right +4/5 +4/5 +4/5 +4/5 +4/5 +4/5   Left +4/5 +4/5 +4/5 +4/5 +4/5 +4/5      Hip Flex  Quads Hamstrings Ankle DF EHL Ankle PF   Right +4/5 +4/5 +4/5 +4/5 +4/5 +4/5   Left +4/5 +4/5 +4/5 +4/5 +4/5 +4/5     IMAGING:    Right shoulder x-rays from 1/21/2021 were personally reviewed with the patient and demonstrated:  FINDINGS: Frontal and internal rotation views of the right shoulder obtained. No  acute fracture or subluxation identified. Glenohumeral joint maintained. Minimal  greater tuberosity spurring and minimal lateral acromion downsloping which can  be associated with subacromial rotator cuff impingement. Mild AC joint  degenerative change.     IMPRESSION:     No clearly acute findings. Mild degenerative/chronic changes as above.     Left shoulder x-rays from 1/21/2021 were personally reviewed with the patient and demonstrated:  FINDINGS: Internal and external rotation views of the left shoulder obtained. No  acute fracture or subluxation identified. Glenohumeral joint maintained. Mild AC  joint degenerative change. Mild lateral acromion downsloping which may cause  rotator cuff impingement.     IMPRESSION:     No clearly acute findings. See additional details above.     Cervical spine MRI from 12/23/2020 was personally reviewed with the patient and demonstrated:          Results from [place] encounter on 12/23/20   MRI CERV SPINE WO CONT     Narrative MRI Cervical Spine      CPT CODE: 68366     INDICATION: Limited cervical range of motion.  Cervical spondylosis with  myelopathy.     TECHNIQUE: MRI of the cervical spine performed consisting of sagittal T1, T2 and  inversion recovery sequences with additional axial T2 sequence.      COMPARISON: Plain films 10/1/2014.     FINDINGS:   Sagittal images show mildly straightened cervical lordosis overall, but  otherwise preserved alignment without any significant listhesis. Moderate to severe intervertebral disc space narrowing C5-6 with mild spurring. Mild diffuse disc space narrowing C6-7. No vertebral body compression deformity or edema.     Ovoid well marginated 1.4 cm high T1 and T2 signal lesion within the midline T1  loses signal on inversion recovery, most consistent with hemangioma.     Normal signal in the cervical cord. No expansion or syrinx. Diffuse a lobulated low T2 signal along the midline vertebral bodies to  intervertebral disc levels. Could reflect component of calcification versus  confluence of disc bulge/protrusion. .     Correlation With Axial Images Shows:  C2-3:  Findings suggest mild central disc protrusion, which effaces fluid space  but does not contact the cervical cord. No significant canal narrowing. Mild  right foraminal encroachment due to uncovertebral spur.     C3-4:  Broad-based posterior disc bulge with some endplate osteophytes. Ventral  fluid space is effaced, with questionable possible cord contact. Mild central  canal narrowing to 9 mm. Mild left foraminal encroachment due predominantly to  hypertrophy of the left facet joints.     C4-5:  Irregular broad-based posterior disc bulge with endplate spur complex. This contacts the ventral cervical cord and results in mild distortion midline  and right paramedian. Overall mild to moderate central canal narrowing to 8 mm. Very mild left foraminal narrowing due to uncovertebral spur predominantly.     C5-6:  Broad-based disc osteophyte complex effaces fluid space and slightly  flattens the ventral cervical cord. No edema. Moderate central canal narrowing  to 8 mm. Moderate to severe right foraminal narrowing due to asymmetric  uncovertebral spur predominantly. Disc bulge may contribute.     C6-7:  Broad-based disc osteophyte complex, with mild central canal narrowing to  9 mm. Questionable cord contact but no distortion.  Mild right and left foraminal  narrowing due to uncovertebral spur.     C7-T1:  Minimal disc bulge. No significant canal narrowing. Mild right foraminal  encroachment due to spur.           Impression IMPRESSION:  1.  Preserved vertebral alignment without compression deformity or appreciable  edema.     2. Multilevel central canal stenosis up to moderate degree due to disc  osteophyte complex as above. Mild ventral cord contact and distortion several  levels, without edema. .     3. Varying degrees of bilateral neural foraminal stenosis at several levels as  outlined above.      Left upper extremity EMG from 12/28/2020 was personally reviewed with the patient and demonstrated:  Interpretation Summary: This EMG study, as performed, is suggestive of a chronic multilevel cervical radiculopathy. Written by Toni Cox, as dictated by Marcial Bowling MD.  I, Dr. Marcial Bowling confirm that all documentation is accurate.

## 2021-10-11 NOTE — PROGRESS NOTES
Chief Complaint   Patient presents with    Follow-up       Pt preferred language for health care discussion is english. Is someone accompanying this pt? no    Is the patient using any DME equipment during OV? no    Depression Screening:  3 most recent [facility] Screens 3/30/2021 2/16/2021 4/8/2019 8/14/2018 11/27/2017   Little interest or pleasure in doing things Not at all Not at all Not at all Not at all Not at all   Feeling down, depressed, irritable, or hopeless Not at all Not at all Not at all Not at all Not at all   Total Score PHQ 2 0 0 0 0 0       Learning Assessment:  Learning Assessment 4/1/2016   PRIMARY LEARNER Patient   BARRIERS PRIMARY LEARNER [address] CAREGIVER No   PRIMARY LANGUAGE ENGLISH   LEARNER PREFERENCE PRIMARY READING   ANSWERED BY patient   RELATIONSHIP SELF       Abuse Screening:  Abuse Screening Questionnaire 2/16/2021 4/8/2019 8/14/2018 11/27/2017   Do you ever feel afraid of your partner? N N N N   Are you in a relationship with someone who physically or mentally threatens you? N N N N   Is it safe for you to go home? Germain JOSEPH       Fall Risk  Fall Risk Assessment, last 12 mths 10/11/2021   Able to walk? Yes   Fall in past 12 months? 0   Do you feel unsteady? 0   Are you worried about falling 0   Is TUG test greater than 12 seconds? -   Is the gait abnormal? -   Number of falls in past 12 months -   Fall with injury? -           Advance Directive:  1. Do you have an advance directive in place? Patient Reply:no    2. If not, would you like material regarding how to put one in place? Patient Reply: no    2. Per patient no changes to their ACP contact no. Coordination of Care:  1. Have you been to the ER, urgent care clinic since your last visit? Hospitalized since your last visit? no    2. Have you seen or consulted any other health care providers outside of the [address] [place] [address] since your last visit? Include any pap smears or colon screening.  no

## 2021-10-11 NOTE — LETTER
10/13/2021    Patient: Kristie Vasquez   YOB: 1945   Date of Visit: 10/11/2021     Michele Olsen MD  [address] [address]  Via Fax: [phone]    Dear Michele Olsen MD,      Thank you for referring Mr. Griffin Cardenas to [facility] for evaluation. My notes for this consultation are attached. If you have questions, please do not hesitate to call me. I look forward to following your patient along with you.       Sincerely,    López Hallman MD

## 2021-10-11 NOTE — PATIENT INSTRUCTIONS
Neck Arthritis: Exercises  Introduction  Here are some examples of exercises for you to try. The exercises may be suggested for a condition or for rehabilitation. Start each exercise slowly. Ease off the exercises if you start to have pain. You will be told when to start these exercises and which ones will work best for you. How to do the exercises  Neck stretches to the side    1. This stretch works best if you keep your shoulder down as you lean away from it. To help you remember to do this, start by relaxing your shoulders and lightly holding on to your thighs or your chair. 2. Tilt your head toward your shoulder and hold for 15 to 30 seconds. Let the weight of your head stretch your muscles. 3. Repeat 2 to 4 times toward each shoulder. Chin tuck    1. Lie on the floor with a rolled-up towel under your neck. Your head should be touching the floor. 2. Slowly bring your chin toward your chest.  3. Hold for a count of 6, and then relax for up to 10 seconds. 4. Repeat 8 to 12 times. Active cervical rotation    1. Sit in a firm chair, or stand up straight. 2. Keeping your chin level, turn your head to the right, and hold for 15 to 30 seconds. 3. Turn your head to the left and hold for 15 to 30 seconds. 4. Repeat 2 to 4 times to each side. Shoulder blade squeeze    1. While standing, squeeze your shoulder blades together. 2. Do not raise your shoulders up as you are squeezing. 3. Hold for 6 seconds. 4. Repeat 8 to 12 times. Shoulder rolls    1. Sit comfortably with your feet shoulder-width apart. You can also do this exercise standing up. 2. Roll your shoulders up, then back, and then down in a smooth, circular motion. 3. Repeat 2 to 4 times. Follow-up care is a key part of your treatment and safety. Be sure to make and go to all appointments, and call your doctor if you are having problems. It's also a good idea to know your test results and keep a list of the medicines you take.   Where can you learn more? Go to http://www.gray.com/  Enter K021 in the search box to learn more about \"Neck Arthritis: Exercises. \"  Current as of: July 1, 2021               Content Version: 13.0  © 2006-2021 [employer]. Care instructions adapted under license by [employer] (which disclaims liability or warranty for this information). If you have questions about a medical condition or this instruction, always ask your healthcare professional. [address] any warranty or liability for your use of this information. Low Back Arthritis: Exercises  Introduction  Here are some examples of typical rehabilitation exercises for your condition. Start each exercise slowly. Ease off the exercise if you start to have pain. Your doctor or physical therapist will tell you when you can start these exercises and which ones will work best for you. When you are not being active, find a comfortable position for rest. Some people are comfortable on the floor or a medium-firm bed with a small pillow under their head and another under their knees. Some people prefer to lie on their side with a pillow between their knees. Don't stay in one position for too long. Take short walks (10 to 20 minutes) every 2 to 3 hours. Avoid slopes, hills, and stairs until you feel better. Walk only distances you can manage without pain, especially leg pain. How to do the exercises  Pelvic tilt    1. Lie on your back with your knees bent. 2. \"Brace\" your stomach--tighten your muscles by pulling in and imagining your belly button moving toward your spine. 3. Press your lower back into the floor. You should feel your hips and pelvis rock back. 4. Hold for 6 seconds while breathing smoothly. 5. Relax and allow your pelvis and hips to rock forward. 6. Repeat 8 to 12 times. Back stretches    1. Get down on your hands and knees on the floor.   2. Relax your head and allow it to droop. Round your back up toward the ceiling until you feel a nice stretch in your upper, middle, and lower back. Hold this stretch for as long as it feels comfortable, or about 15 to 30 seconds. 3. Return to the starting position with a flat back while you are on your hands and knees. 4. Let your back sway by pressing your stomach toward the floor. Lift your buttocks toward the ceiling. 5. Hold this position for 15 to 30 seconds. 6. Repeat 2 to 4 times. Follow-up care is a key part of your treatment and safety. Be sure to make and go to all appointments, and call your doctor if you are having problems. It's also a good idea to know your test results and keep a list of the medicines you take. Where can you learn more? Go to http://www.joyce.com/  Enter T094 in the search box to learn more about \"Low Back Arthritis: Exercises. \"  Current as of: July 1, 2021               Content Version: 13.0  © 2006-2021 [employer]. Care instructions adapted under license by [employer] (which disclaims liability or warranty for this information). If you have questions about a medical condition or this instruction, always ask your healthcare professional. [address] any warranty or liability for your use of this information.

## 2022-03-14 ENCOUNTER — OFFICE VISIT (OUTPATIENT)
Dept: ORTHOPEDIC SURGERY | Age: 77
End: 2022-03-14
Payer: MEDICARE

## 2022-03-14 VITALS
TEMPERATURE: 97.2 F | HEIGHT: 71 IN | BODY MASS INDEX: 26.88 KG/M2 | RESPIRATION RATE: 16 BRPM | WEIGHT: 192 LBS | OXYGEN SATURATION: 96 % | HEART RATE: 67 BPM

## 2022-03-14 DIAGNOSIS — M79.2 NEURITIS: ICD-10-CM

## 2022-03-14 DIAGNOSIS — M48.02 CERVICAL SPINAL STENOSIS: Primary | ICD-10-CM

## 2022-03-14 DIAGNOSIS — M54.12 CERVICAL RADICULOPATHY: ICD-10-CM

## 2022-03-14 DIAGNOSIS — M62.838 MUSCLE SPASM: ICD-10-CM

## 2022-03-14 DIAGNOSIS — M35.3 PMR (POLYMYALGIA RHEUMATICA) (HCC): ICD-10-CM

## 2022-03-14 PROCEDURE — 1101F PT FALLS ASSESS-DOCD LE1/YR: CPT | Performed by: PHYSICAL MEDICINE & REHABILITATION

## 2022-03-14 PROCEDURE — G8419 CALC BMI OUT NRM PARAM NOF/U: HCPCS | Performed by: PHYSICAL MEDICINE & REHABILITATION

## 2022-03-14 PROCEDURE — G8427 DOCREV CUR MEDS BY ELIG CLIN: HCPCS | Performed by: PHYSICAL MEDICINE & REHABILITATION

## 2022-03-14 PROCEDURE — 99213 OFFICE O/P EST LOW 20 MIN: CPT | Performed by: PHYSICAL MEDICINE & REHABILITATION

## 2022-03-14 PROCEDURE — G8536 NO DOC ELDER MAL SCRN: HCPCS | Performed by: PHYSICAL MEDICINE & REHABILITATION

## 2022-03-14 PROCEDURE — G8432 DEP SCR NOT DOC, RNG: HCPCS | Performed by: PHYSICAL MEDICINE & REHABILITATION

## 2022-03-14 RX ORDER — LATANOPROST/PF 0.005 %
DROPS OPHTHALMIC (EYE)
COMMUNITY

## 2022-03-14 RX ORDER — CYANOCOBALAMIN (VITAMIN B-12) 500 MCG
TABLET ORAL AS DIRECTED
COMMUNITY

## 2022-03-14 RX ORDER — GABAPENTIN 300 MG/1
CAPSULE ORAL
Qty: 90 CAPSULE | Refills: 0 | Status: SHIPPED | OUTPATIENT
Start: 2022-03-14

## 2022-03-14 NOTE — PROGRESS NOTES
[facility]  [address] [place], [facility] [address]  Phone: [phone]  Fax: [phone]    Pt's YOB: 1945    ASSESSMENT   Diagnoses and all orders for this visit:    1. Cervical spinal stenosis    2. Neuritis  -     gabapentin (NEURONTIN) 300 mg capsule; Take 1 cap daily by mouth as directed. 3. Muscle spasm    4. Cervical radiculopathy  -     gabapentin (NEURONTIN) 300 mg capsule; Take 1 cap daily by mouth as directed. 5. PMR (polymyalgia rheumatica) ([facility])         IMPRESSION AND PLAN:  Cori Dimas is a 67 y.o. male with history of cervical and shoulder pain. He complains of complains of occasional focal right arm soreness and intermittent numbness and feelings of swelling in the bilateral feet. However, he otherwise notes improvement in function of his legs and range of motion in his arms and states that his condition is 80% improved. Pt is taking Aleve 1 cap QHS, Neurontin 300 mg 1 cap QHS, and prednisone 2.5 mg 1 tab every third day, noting no difference on days when he does not take prednisone. 1) Pt was given information on lumbar arthritis exercises. 2) Pt received refills of Neurontin 300 mg-- he may taper off of this as directed. 3) Discussed polymyalgia rheumatica -- symptoms and treatment. 4) I directed the pt to discontinue prednisone 2.5 mg at this time and, after 1 week, try discontinuing Neurontin to assess benefit. 5) I recommended the pt take his evening dose of Neurontin later in the evening  to mitigate nocturnal neuropathic symptoms. 6) I advised the pt to follow up with a dermatologist for a comprehensive skin examination and evaluation of the sun damage lesions on his hand. 7) I advised the pt about safe dosing of Aleve - up to 1 cap BID or 2 caps concurrently. 8) I advised the pt to continue shoulder exercises learned from physical therapy to maintain range of motion in his shoulders.   9)  Shiraz Means has a reminder for a \"due or due soon\" health maintenance. I have asked that he contact his primary care provider, Mary Lemons MD, for follow-up on this health maintenance. 10)  demonstrated consistency with prescribing. Follow-up and Dispositions    · Return in about 3 months (around 6/14/2022) for Medication follow up. HISTORY OF PRESENT ILLNESS:  Waleska Mir is a 67 y.o. male with history of cervical and lumbar pain and presents to the office today for medication follow up. Pt complains of occasional focal right arm soreness and intermittent numbness and feelings of swelling in the bilateral feet. However, he otherwise notes improvement in function of his legs and range of motion in his arms and states that his condition is 80% improved. He notes that he no longer plays tennis but remains active with yard work. Pt is taking Aleve 1 cap QHS, Neurontin 300 mg 1 cap QHS, and prednisone 2.5 mg 1 tab every third day, noting no difference on days when he does not take prednisone. He denies any sedation or cognitive side effects when taking Neurontin. Pt notes that he followed up with Dr. Barron Yee in 01/2022 and was instructed to taper off prednisone. He also states he is scheduled to follow up with Dr. Barron Yee again on 03/21/2022. Pt states that he underwent a dermatological examination in 06/2021 with removal of a lesion that was found to be benign. Labs from 01/27/2021 were reviewed and demonstrated a sedimentation rate of 27 mm/hr and a C-reactive protein of 2.2 mg/dL. Pt at this time desires to continue with current care.     Pain Scale: 1/10    PCP: Mary Lemons MD     Past Medical History:   Diagnosis Date    Bladder cancer ([facility] [address] 02/24/2016    Elevated prostate specific antigen (PSA)     Essential hypertension     Hiatal hernia     Nodular prostate         Social History     Socioeconomic History    Marital status:      Spouse name: Not on file    Number of children: Not on file    Years of education: Not on file    Highest education level: Not on file   Occupational History    Not on file   Tobacco Use    Smoking status: Never Smoker    Smokeless tobacco: Never Used   Substance and Sexual Activity    Alcohol use: Yes     Comment: OCASSIONAL-WINE    Drug use: No    Sexual activity: Yes   Other Topics Concern    Not on file   Social History Narrative    Not on file     Social Determinants of Health     Financial Resource Strain:     Difficulty of Paying Living Expenses: Not on file   Food Insecurity:     Worried About Running Out of Food in the Last Year: Not on file    Jd of Food in the Last Year: Not on file   Transportation Needs:     Lack of Transportation (Medical): Not on file    Lack of Transportation (Non-Medical): Not on file   Physical Activity:     Days of Exercise per Week: Not on file    Minutes of Exercise per Session: Not on file   Stress:     Feeling of Stress : Not on file   Social Connections:     Frequency of Communication with Friends and Family: Not on file    Frequency of Social Gatherings with Friends and Family: Not on file    Attends Yazdanism Services: Not on file    Active Member of [address] [place] [address] or Organizations: Not on file    Attends Club or Organization Meetings: Not on file    Marital Status: Not on file   Intimate Partner Violence:     Fear of Current or Ex-Partner: Not on file    Emotionally Abused: Not on file    Physically Abused: Not on file    Sexually Abused: Not on file   Housing Stability:     Unable to Pay for Housing in the Last Year: Not on file    Number of [place] in the Last Year: Not on file    Unstable Housing in the Last Year: Not on file       Current Outpatient Medications   Medication Sig Dispense Refill    gabapentin (NEURONTIN) 300 mg capsule Take 1 cap daily by mouth as directed. 90 Capsule 0    finasteride (PROSCAR) 5 mg tablet Take 1 Tablet by mouth daily.  90 Tablet 3  finasteride (PROSCAR) 5 mg tablet Take 5 mg by mouth daily.  predniSONE (DELTASONE) 2.5 mg tablet Take 1 tab by mouth daily (with food) as directed. 30 Tablet 2    omeprazole (PRILOSEC) 10 mg capsule Take 10 mg by mouth daily.  lisinopril (PRINIVIL, ZESTRIL) 5 mg tablet Take  by mouth daily.  cholecalciferol (Vitamin D3) (400 Units /10 mcg) tab tablet as directed.  latanoprost, PF, 0.005 % drop 1 drop into affected eye in the evening      torsemide (DEMADEX) 5 mg tablet Take 1 Tab by mouth daily. (Patient not taking: Reported on 10/11/2021)         No Known Allergies      REVIEW OF SYSTEMS    Constitutional: Negative for fever, chills, or weight change. Respiratory: Negative for cough or shortness of breath. Cardiovascular: Negative for chest pain or palpitations. Gastrointestinal: Negative for acid reflux, change in bowel habits, or constipation. Genitourinary: Negative for dysuria and flank pain. Musculoskeletal: Positive for right arm pain. Skin: Negative for rash. Neurological: Negative for headaches, dizziness, or numbness. Endo/Heme/Allergies: Positive for increased bruising. Psychiatric/Behavioral: Negative for difficulty with sleep. As per HPI    PHYSICAL EXAMINATION  Visit Vitals  Pulse 67   Temp 97.2 °F (36.2 °C) (Temporal)   Resp 16   Ht 5' 11\" (1.803 m)   Wt 192 lb (87.1 kg)   SpO2 96%   BMI 26.78 kg/m²       Constitutional: Awake, alert, and in no acute distress. Neurological:  Sensation to light touch is intact. Negative Trimble's sign bilaterally. Skin: warm, dry, and intact--multiple actinic keratosis on dorsal surface of hands bilaterally. Musculoskeletal:  No pain with extension, axial loading, or forward flexion. No pain with internal or external rotation of his hips. Negative straight leg raise bilaterally.       Biceps  Triceps Deltoids Wrist Ext Wrist Flex Hand Intrin   Right +4/5 +4/5 +4/5 +4/5 +4/5 +4/5   Left +4/5 +4/5 +4/5 +4/5 +4/5 +4/5 Hip Flex  Quads Hamstrings Ankle DF EHL Ankle PF   Right +4/5 +4/5 +4/5 +4/5 +4/5 +4/5   Left +4/5 +4/5 +4/5 +4/5 +4/5 +4/5     IMAGING:    Cervical spine MRI from 12/23/2020 was personally reviewed with the patient and demonstrated:          Results from [place] encounter on 12/23/20   MRI CERV SPINE WO CONT     Narrative MRI Cervical Spine      CPT CODE: 10114     INDICATION: Limited cervical range of motion. Cervical spondylosis with  myelopathy.     TECHNIQUE: MRI of the cervical spine performed consisting of sagittal T1, T2 and  inversion recovery sequences with additional axial T2 sequence.      COMPARISON: Plain films 10/1/2014.     FINDINGS:   Sagittal images show mildly straightened cervical lordosis overall, but  otherwise preserved alignment without any significant listhesis. Moderate to severe intervertebral disc space narrowing C5-6 with mild spurring. Mild diffuse disc space narrowing C6-7. No vertebral body compression deformity or edema.     Ovoid well marginated 1.4 cm high T1 and T2 signal lesion within the midline T1  loses signal on inversion recovery, most consistent with hemangioma.     Normal signal in the cervical cord. No expansion or syrinx. Diffuse a lobulated low T2 signal along the midline vertebral bodies to  intervertebral disc levels. Could reflect component of calcification versus  confluence of disc bulge/protrusion. .     Correlation With Axial Images Shows:  C2-3:  Findings suggest mild central disc protrusion, which effaces fluid space  but does not contact the cervical cord. No significant canal narrowing. Mild  right foraminal encroachment due to uncovertebral spur.     C3-4:  Broad-based posterior disc bulge with some endplate osteophytes. Ventral  fluid space is effaced, with questionable possible cord contact. Mild central  canal narrowing to 9 mm.  Mild left foraminal encroachment due predominantly to  hypertrophy of the left facet joints.     C4-5:  Irregular broad-based posterior disc bulge with endplate spur complex. This contacts the ventral cervical cord and results in mild distortion midline  and right paramedian. Overall mild to moderate central canal narrowing to 8 mm. Very mild left foraminal narrowing due to uncovertebral spur predominantly.     C5-6:  Broad-based disc osteophyte complex effaces fluid space and slightly  flattens the ventral cervical cord. No edema. Moderate central canal narrowing  to 8 mm. Moderate to severe right foraminal narrowing due to asymmetric  uncovertebral spur predominantly. Disc bulge may contribute.     C6-7:  Broad-based disc osteophyte complex, with mild central canal narrowing to  9 mm. Questionable cord contact but no distortion. Mild right and left foraminal  narrowing due to uncovertebral spur.     C7-T1:  Minimal disc bulge. No significant canal narrowing. Mild right foraminal  encroachment due to spur.           Impression IMPRESSION:  1.  Preserved vertebral alignment without compression deformity or appreciable  edema.     2. Multilevel central canal stenosis up to moderate degree due to disc  osteophyte complex as above. Mild ventral cord contact and distortion several  levels, without edema. .     3. Varying degrees of bilateral neural foraminal stenosis at several levels as  outlined above.      Left upper extremity EMG from 12/28/2020 was personally reviewed with the patient and demonstrated:  Interpretation Summary: This EMG study, as performed, is suggestive of a chronic multilevel cervical radiculopathy. Written by Marcela Epps, as dictated by Alec Bravo MD.  I, Dr. Alec Bravo confirm that all documentation is accurate.

## 2022-06-13 ENCOUNTER — TELEPHONE (OUTPATIENT)
Dept: ORTHOPEDIC SURGERY | Age: 77
End: 2022-06-13

## 2022-06-14 ENCOUNTER — OFFICE VISIT (OUTPATIENT)
Dept: ORTHOPEDIC SURGERY | Age: 77
End: 2022-06-14
Payer: MEDICARE

## 2022-06-14 VITALS
BODY MASS INDEX: 26.04 KG/M2 | RESPIRATION RATE: 16 BRPM | TEMPERATURE: 96.3 F | HEIGHT: 71 IN | WEIGHT: 186 LBS | OXYGEN SATURATION: 94 % | HEART RATE: 85 BPM

## 2022-06-14 DIAGNOSIS — M19.011 OSTEOARTHRITIS OF BOTH SHOULDERS, UNSPECIFIED OSTEOARTHRITIS TYPE: ICD-10-CM

## 2022-06-14 DIAGNOSIS — M19.012 OSTEOARTHRITIS OF BOTH SHOULDERS, UNSPECIFIED OSTEOARTHRITIS TYPE: ICD-10-CM

## 2022-06-14 DIAGNOSIS — M35.3 PMR (POLYMYALGIA RHEUMATICA) (HCC): ICD-10-CM

## 2022-06-14 DIAGNOSIS — M62.838 MUSCLE SPASM: ICD-10-CM

## 2022-06-14 DIAGNOSIS — M48.02 CERVICAL SPINAL STENOSIS: Primary | ICD-10-CM

## 2022-06-14 DIAGNOSIS — M79.2 NEURITIS: ICD-10-CM

## 2022-06-14 PROCEDURE — 1101F PT FALLS ASSESS-DOCD LE1/YR: CPT | Performed by: PHYSICAL MEDICINE & REHABILITATION

## 2022-06-14 PROCEDURE — 1123F ACP DISCUSS/DSCN MKR DOCD: CPT | Performed by: PHYSICAL MEDICINE & REHABILITATION

## 2022-06-14 PROCEDURE — G8427 DOCREV CUR MEDS BY ELIG CLIN: HCPCS | Performed by: PHYSICAL MEDICINE & REHABILITATION

## 2022-06-14 PROCEDURE — G8536 NO DOC ELDER MAL SCRN: HCPCS | Performed by: PHYSICAL MEDICINE & REHABILITATION

## 2022-06-14 PROCEDURE — 99213 OFFICE O/P EST LOW 20 MIN: CPT | Performed by: PHYSICAL MEDICINE & REHABILITATION

## 2022-06-14 PROCEDURE — G8417 CALC BMI ABV UP PARAM F/U: HCPCS | Performed by: PHYSICAL MEDICINE & REHABILITATION

## 2022-06-14 PROCEDURE — G8432 DEP SCR NOT DOC, RNG: HCPCS | Performed by: PHYSICAL MEDICINE & REHABILITATION

## 2022-06-14 NOTE — PROGRESS NOTES
Chief Complaint   Patient presents with    Follow-up       Pt preferred language for health care discussion is english. Is someone accompanying this pt? no    Is the patient using any DME equipment during OV? no    Depression Screening:  3 most recent [facility] Screens 3/30/2021 2/16/2021 4/8/2019 8/14/2018 11/27/2017   Little interest or pleasure in doing things Not at all Not at all Not at all Not at all Not at all   Feeling down, depressed, irritable, or hopeless Not at all Not at all Not at all Not at all Not at all   Total Score PHQ 2 0 0 0 0 0       Learning Assessment:  Learning Assessment 4/1/2016   PRIMARY LEARNER Patient   BARRIERS PRIMARY LEARNER [address] CAREGIVER No   PRIMARY LANGUAGE ENGLISH   LEARNER PREFERENCE PRIMARY READING   ANSWERED BY patient   RELATIONSHIP SELF       Abuse Screening:  Abuse Screening Questionnaire 2/16/2021 4/8/2019 8/14/2018 11/27/2017   Do you ever feel afraid of your partner? N N N N   Are you in a relationship with someone who physically or mentally threatens you? N N N N   Is it safe for you to go home? Rajan JOSEPH       Fall Risk  Fall Risk Assessment, last 12 mths 3/14/2022   Able to walk? Yes   Fall in past 12 months? 1   Do you feel unsteady? -   Are you worried about falling -   Is TUG test greater than 12 seconds? -   Is the gait abnormal? -   Number of falls in past 12 months 1   Fall with injury? 1           Advance Directive:  1. Do you have an advance directive in place? Patient Reply:no    2. If not, would you like material regarding how to put one in place? Patient Reply: no    2. Per patient no changes to their ACP contact no. Coordination of Care:  1. Have you been to the ER, urgent care clinic since your last visit? Hospitalized since your last visit? no    2. Have you seen or consulted any other health care providers outside of the [address] [place] [address] since your last visit? Include any pap smears or colon screening.  no

## 2022-06-14 NOTE — PATIENT INSTRUCTIONS
Nighttime Leg Cramps: Care Instructions  Your Care Instructions     Nighttime leg cramps happen when a leg muscle tightens up suddenly. This most often happens in the calf. But cramps in the thigh or foot are also common. Cramps often occur just as you fall asleep or wake up. Leg cramps can be painful. They can last a few seconds to a few minutes. Though they are common, experts don't know exactly what causes them. To treat muscle cramps, you can stretch and massage the muscle. If cramps keep coming back, your doctor may prescribe medicine that relaxes your muscles. Follow-up care is a key part of your treatment and safety. Be sure to make and go to all appointments, and call your doctor if you are having problems. It's also a good idea to know your test results and keep a list of the medicines you take. How can you care for yourself at home? · To stop a leg cramp, sit down and straighten your leg as you bend your foot up toward your knee. It may help to place a rolled towel under the ball of your foot and, while you hold the towel at both ends, gently pull the towel toward you while you keep your knee straight. This stretches the calf muscles. The cramp usually goes away after a few minutes. · Take a warm shower or bath to relax the muscle. Some people find that a heating pad placed on the muscle can also help. Others get relief by rubbing the calf with an ice pack. · Stretch your muscles every day, especially before and after exercise and at bedtime. Regular stretching can relax your muscles and may prevent cramps. · Do not suddenly increase the amount of exercise you get. Increase your exercise a little each week. · If your doctor prescribes medicine, take it exactly as prescribed. Call your doctor if you think you are having a problem with your medicine.   · Ask your doctor if you can take an over-the-counter pain medicine, such as acetaminophen (Tylenol), ibuprofen (Advil, Motrin), or naproxen (Aleve). Be safe with medicines. Read and follow all instructions on the label. · Drink plenty of fluids. If you have kidney, heart, or liver disease and have to limit fluids, talk with your doctor before you increase the amount of fluids you drink. When should you call for help? Watch closely for changes in your health, and be sure to contact your doctor if:    · You often have muscle cramps that do not go away after home treatment.     · Your muscle cramps often wake you up at night.     · You do not get better as expected. Where can you learn more? Go to http://www.gray.com/  Enter S910 in the search box to learn more about \"Nighttime Leg Cramps: Care Instructions. \"  Current as of: December 13, 2021               Content Version: 13.2  © 4124-5217 [employer]. Care instructions adapted under license by [employer] (which disclaims liability or warranty for this information). If you have questions about a medical condition or this instruction, always ask your healthcare professional. [address] any warranty or liability for your use of this information.

## 2022-07-17 NOTE — PROGRESS NOTES
[facility]  [address], [address]  Phone: [phone]  Fax: [phone]    Pt's YOB: 1945    ASSESSMENT   Diagnoses and all orders for this visit:    1. Cervical spinal stenosis    2. Neuritis    3. Muscle spasm    4. PMR (polymyalgia rheumatica) (HCC)    5. Osteoarthritis of both shoulders, unspecified osteoarthritis type         IMPRESSION AND PLAN:  Pt is 64 yo male with hx PMR which responded well to low dose prednisone and has now tapered off without return of his weakness; he is able to perform household tasks and is using Aleve. He also is now off gabapentin and has hired out the more demanding aspects of his yard work. 1) Pt was given information on leg cramps. 2) Pt encouraged to have regular HEP 5 days / week and do weight resistance 2X/ week  3) Pt will follow up with Dr Cr Servin as directed  4) Mr. Epi Rae has a reminder for a \"due or due soon\" health maintenance. I have asked that he contact his primary care provider, Inge Ramirez MD, for follow-up on this health maintenance. 5)  demonstrated consistency with prescribing. Follow-up and Dispositions    · Return if symptoms worsen or fail to improve. HISTORY OF PRESENT ILLNESS:  Stephanie Guzman is a 65 y.o.  male with history of PMR and shoulder pain and presents to the office today for medication follow up. Pt reports overall he is doing well. He was able to taper off of the prednisone and does not experience any significant proximal muscle weakness. He has also been seen by Dr Cr Servin for the PMR. Pt also reports he has tapered off of the gabapentin; he does have some left foot NT but does not want to use the medication regularly. He is able to do yard work but reports he has hired out the more physically demanding aspects and is able to perform what he wants.   He does have some shoulder arthritis, left arm pain and uses Aleve as needed. He also reports leg cramps at night. Pt would like to continue to keep medication to a minimum and is stable and doing well of of the prednisone and gabapentin. Pain Scale: 0 - No pain/10    PCP: Humphrey Arizmendi MD     Past Medical History:   Diagnosis Date    [facility]    Elevated prostate specific antigen (PSA)     Essential hypertension     Hiatal hernia     Nodular prostate         Social History     Socioeconomic History    Marital status:      Spouse name: Not on file    Number of children: Not on file    Years of education: Not on file    Highest education level: Not on file   Occupational History    Not on file   Tobacco Use    Smoking status: Never Smoker    Smokeless tobacco: Never Used   Vaping Use    Vaping Use: Not on file   Substance and Sexual Activity    Alcohol use: Yes     Comment: OCASSIONAL-WINE    Drug use: No    Sexual activity: Yes   Other Topics Concern    Not on file   Social History Narrative    Not on file     Social Determinants of Health     Financial Resource Strain:     Difficulty of Paying Living Expenses: Not on file   Food Insecurity:     Worried About [address] [facility] in the Last Year: Not on file    Jd of Food in the Last Year: Not on file   Transportation Needs:     Lack of Transportation (Medical): Not on file    Lack of Transportation (Non-Medical):  Not on file   Physical Activity:     Days of Exercise per Week: Not on file    Minutes of Exercise per Session: Not on file   Stress:     Feeling of Stress : Not on file   Social Connections:     Frequency of Communication with Friends and Family: Not on file    Frequency of Social Gatherings with Friends and Family: Not on file    Attends Rastafarian Services: Not on file    Active Member of Clubs or Organizations: Not on file    Attends Club or Organization Meetings: Not on file    Marital Status: Not on file   Intimate Partner Violence:     Fear of Current or Ex-Partner: Not on file    Emotionally Abused: Not on file    Physically Abused: Not on file    Sexually Abused: Not on file   Housing Stability:     Unable to Pay for Housing in the Last Year: Not on file    Number of Places Lived in the Last Year: Not on file    Unstable Housing in the Last Year: Not on file       Current Outpatient Medications   Medication Sig Dispense Refill    cholecalciferol (Vitamin D3) (400 Units /10 mcg) tab tablet as directed.  latanoprost, PF, 0.005 % drop 1 drop into affected eye in the evening      gabapentin (NEURONTIN) 300 mg capsule Take 1 cap daily by mouth as directed. 90 Capsule 0    finasteride (PROSCAR) 5 mg tablet Take 1 Tablet by mouth daily. 90 Tablet 3    finasteride (PROSCAR) 5 mg tablet Take 5 mg by mouth daily.  omeprazole (PRILOSEC) 10 mg capsule Take 10 mg by mouth daily.  lisinopril (PRINIVIL, ZESTRIL) 5 mg tablet Take  by mouth daily.  predniSONE (DELTASONE) 2.5 mg tablet Take 1 tab by mouth daily (with food) as directed. (Patient not taking: Reported on 6/14/2022) 30 Tablet 2    torsemide (DEMADEX) 5 mg tablet Take 1 Tablet by mouth daily. (Patient not taking: Reported on 6/14/2022)         No Known Allergies      REVIEW OF SYSTEMS    Constitutional: Negative for fever, chills, or weight change. Respiratory: Negative for cough or shortness of breath. Cardiovascular: Negative for chest pain or palpitations. Gastrointestinal: Negative for acid reflux, change in bowel habits, or constipation. Genitourinary: Negative for dysuria and flank pain. Musculoskeletal: Positive for left foot and shoulder pain occasionally and nocturnal leg cramps. Skin: Negative for rash. Neurological: Negative for headaches, dizziness, positive for left foot numbness at times  Endo/Heme/Allergies: Negative for increased bruising. Psychiatric/Behavioral: Negative for difficulty with sleep.     As per HPI    PHYSICAL EXAMINATION  Visit Vitals  Pulse 85   Temp (!) 96.3 °F (35.7 °C) (Temporal)   Resp 16   Ht 5' 11\" (1.803 m)   Wt 186 lb (84.4 kg)   SpO2 94%   BMI 25.94 kg/m²       Constitutional: Awake, alert, and in no acute distress. Neurological: 1+ symmetrical DTRs in the upper extremities. 1+ symmetrical DTRs in the lower extremities. Sensation to light touch is intact. Negative Trimble's sign bilaterally. Skin: warm, dry, and intact. Musculoskeletal:  No pain with extension, axial loading, or forward flexion. No pain with internal or external rotation of his hips. Negative straight leg raise bilaterally; Good ROM shoulder       Biceps  Triceps Deltoids Wrist Ext Wrist Flex Hand Intrin   Right +4/5 +4/5 +4/5 +4/5 +4/5 +4/5   Left +4/5 +4/5 +4/5 +4/5 +4/5 +4/5      Hip Flex  Quads Hamstrings Ankle DF EHL Ankle PF   Right +4/5 +4/5 +4/5 +4/5 +4/5 +4/5   Left +4/5 +4/5 +4/5 +4/5 +4/5 +4/5     IMAGING:     Cervical spine MRI from 12/23/2020 was personally reviewed with the patient and demonstrated:          Results from [place] encounter on 12/23/20   MRI CERV SPINE WO CONT     Narrative MRI Cervical Spine      CPT CODE: 80497     INDICATION: Limited cervical range of motion. Cervical spondylosis with  myelopathy.     TECHNIQUE: MRI of the cervical spine performed consisting of sagittal T1, T2 and  inversion recovery sequences with additional axial T2 sequence.      COMPARISON: Plain films 10/1/2014.     FINDINGS:   Sagittal images show mildly straightened cervical lordosis overall, but  otherwise preserved alignment without any significant listhesis. Moderate to severe intervertebral disc space narrowing C5-6 with mild spurring. Mild diffuse disc space narrowing C6-7. No vertebral body compression deformity or edema.     Ovoid well marginated 1.4 cm high T1 and T2 signal lesion within the midline T1  loses signal on inversion recovery, most consistent with hemangioma.     Normal signal in the cervical cord.  No expansion or syrinx. Diffuse a lobulated low T2 signal along the midline vertebral bodies to  intervertebral disc levels. Could reflect component of calcification versus  confluence of disc bulge/protrusion. .     Correlation With Axial Images Shows:  C2-3:  Findings suggest mild central disc protrusion, which effaces fluid space  but does not contact the cervical cord. No significant canal narrowing. Mild  right foraminal encroachment due to uncovertebral spur.     C3-4:  Broad-based posterior disc bulge with some endplate osteophytes. Ventral  fluid space is effaced, with questionable possible cord contact. Mild central  canal narrowing to 9 mm. Mild left foraminal encroachment due predominantly to  hypertrophy of the left facet joints.     C4-5:  Irregular broad-based posterior disc bulge with endplate spur complex. This contacts the ventral cervical cord and results in mild distortion midline  and right paramedian. Overall mild to moderate central canal narrowing to 8 mm. Very mild left foraminal narrowing due to uncovertebral spur predominantly.     C5-6:  Broad-based disc osteophyte complex effaces fluid space and slightly  flattens the ventral cervical cord. No edema. Moderate central canal narrowing  to 8 mm. Moderate to severe right foraminal narrowing due to asymmetric  uncovertebral spur predominantly. Disc bulge may contribute.     C6-7:  Broad-based disc osteophyte complex, with mild central canal narrowing to  9 mm. Questionable cord contact but no distortion. Mild right and left foraminal  narrowing due to uncovertebral spur.     C7-T1:  Minimal disc bulge. No significant canal narrowing. Mild right foraminal  encroachment due to spur.           Impression IMPRESSION:  1.  Preserved vertebral alignment without compression deformity or appreciable  edema.     2. Multilevel central canal stenosis up to moderate degree due to disc  osteophyte complex as above.  Mild ventral cord contact and distortion several  levels, without edema. .     3. Varying degrees of bilateral neural foraminal stenosis at several levels as  outlined above.      Left upper extremity EMG from 12/28/2020 was personally reviewed with the patient and demonstrated:  Interpretation Summary: This EMG study, as performed, is suggestive of a chronic multilevel cervical radiculopathy. Left shoulder x-ray 01/21/2021 was personally reviewed and demonstrated:  XR SHOULDER LT AP/LAT MIN 2 V: Patient Communication     Add Comments   Seen       Study Result    Narrative & Impression   EXAMINATION: Left shoulder 2 views     INDICATION: Left shoulder pain     COMPARISON: None     FINDINGS: Internal and external rotation views of the left shoulder obtained. No  acute fracture or subluxation identified. Glenohumeral joint maintained. Mild AC  joint degenerative change. Mild lateral acromion downsloping which may cause  rotator cuff impingement.     IMPRESSION  IMPRESSION:     No clearly acute findings. See additional details above. Right shoulder x-ray from 01/12/2021 was personally reviewed and demonstrated:  XR SHOULDER RT AP/LAT MIN 2 V: Patient Communication     Add Comments   Seen       Study Result    Narrative & Impression   EXAMINATION: Right shoulder 2 views     INDICATION: Bilateral shoulder pain     COMPARISON: None     FINDINGS: Frontal and internal rotation views of the right shoulder obtained. No  acute fracture or subluxation identified. Glenohumeral joint maintained. Minimal  greater tuberosity spurring and minimal lateral acromion downsloping which can  be associated with subacromial rotator cuff impingement. Mild AC joint  degenerative change.     IMPRESSION  IMPRESSION:     No clearly acute findings. Mild degenerative/chronic changes as above.

## 2023-01-07 NOTE — PROGRESS NOTES
[facility]  [address] [place] [address]., [address]  [place] [address]  Phone: [phone]  Fax: [phone]    NEW PATIENT  Pt's YOB: 1945    ASSESSMENT   Diagnoses and all orders for this visit:    1. Cervical spinal stenosis  -     REFERRAL TO PHYSICAL THERAPY    2. Cervical radiculopathy  -     REFERRAL TO PHYSICAL THERAPY    3. Chronic pain of both shoulders  -     REFERRAL TO PHYSICAL THERAPY  -     XR SHOULDER RT AP/LAT MIN 2 V; Future  -     XR SHOULDER LT AP/LAT MIN 2 V; Future    4. Muscle spasm  -     REFERRAL TO PHYSICAL THERAPY         IMPRESSION AND PLAN:  Griffin Laureano Jr is a 75 y.o. right hand dominant male with history of neck pain. He complains of pain in the upper back that radiates down both arms to the elbows, L>R. Pt also complains of aching pain, stiffness, and limited range of motion in both shoulders, L>R. He denies any previous physical therapy and takes Neurontin 300 mg 2 caps QAM, 2 caps in the afternoon and 4 caps QHS last week.    1) Pt was given information on cervical arthritis exercises.   2) He was referred to cervical and shoulder physical therapy with cervical traction.   3) Bilateral shoulder x-rays were ordered.   4) Pt will decrease his Neurontin 300 mg to 1 cap QAM, 1 cap in the afternoon, and 4 caps QHS.  5) He was counseled on taking no more than 2 tabs of Aleve daily.   6) Pt may use OTC Voltaren gel if needed.  7) Mr. Sridevi Mallory has a reminder for a \"due or due soon\" health maintenance. I have asked that he contact his primary care provider, Steven Anand MD, for follow-up on this health maintenance.  8)  demonstrated consistency with prescribing.   Follow-up and Dispositions    · Return in about 5 weeks (around 2/15/2021) for Diagnostic Test follow up, PT follow up, Medication follow up.           HISTORY OF PRESENT ILLNESS:  Griffin Laureano Jr is a 75 y.o. right hand dominant male with history of neck pain. Pt  presents to the office today as a new patient referred by Dr. Phill Raya. He complains of pain in the upper back that radiates down both arms to the elbows, L>R. Pt notes that his pain is more severe at night, disrupts his sleep, and he generally wakes up several times a night to use a heating pad for relief. He notes that his pain gradually improves throughout the day after getting out of bed. Pt reports the onset of pain around 11/1/2020. He notes that he was doing home repairs on his roof and siding around that time and suspects this contributed to his pain 11/1/2020. He likes to play tennis but denies any inciting injuries. He complains of aching pain, stiffness, and limited range of motion in both shoulders. Pt denies any weakness in the arms or loss of manual dexterity. He also reports numbness and swelling in the feet at night. Pt denies any issues with balance. He also complains of numbness/soreness in the buttocks after sitting on a hard surface for extended periods of time. Pt had previous bladder surgery but denies any previous spine surgery or joint replacements. He also denies any previous physical therapy. Pt takes Neurontin 300 mg and increased to 2 caps QAM, 2 caps in the afternoon and 4 caps QHS last week. He admits to greater relief in his pain when taking OTC Aleve 3 tabs daily. Pt reports significant temporary relief when taking a prednisone taper. He is not currently on anticoagulants. Pt at this time desires to proceed with medication evaluation, shoulder x-rays, and physical therapy.     Pain Scale: 4/10     PCP: Hannah Cottrell MD    Past Medical History:   Diagnosis Date    [facility] 02/24/2016    Elevated prostate specific antigen (PSA)     Essential hypertension     Hiatal hernia     Nodular prostate         Social History     Socioeconomic History    Marital status:      Spouse name: Not on file    Number of children: Not on file    Years of education: Not on file    Highest education level: Not on file   Occupational History    Not on file   Social Needs    Financial resource strain: Not on file    Food insecurity     Worry: Not on file     Inability: Not on file    Transportation needs     Medical: Not on file     Non-medical: Not on file   Tobacco Use    Smoking status: Never Smoker    Smokeless tobacco: Never Used   Substance and Sexual Activity    Alcohol use: Yes     Comment: OCASSIONAL-WINE    Drug use: No    Sexual activity: Yes   Lifestyle    Physical activity     Days per week: Not on file     Minutes per session: Not on file    Stress: Not on file   Relationships    Social connections     Talks on phone: Not on file     Gets together: Not on file     Attends Mandaeism service: Not on file     Active member of club or organization: Not on file     Attends meetings of clubs or organizations: Not on file     Relationship status: Not on file    Intimate partner violence     Fear of current or ex partner: Not on file     Emotionally abused: Not on file     Physically abused: Not on file     Forced sexual activity: Not on file   Other Topics Concern    Not on file   Social History Narrative    Not on file       Current Outpatient Medications   Medication Sig Dispense Refill    gabapentin (NEURONTIN) 300 mg capsule       finasteride (PROSCAR) 5 mg tablet Take 1 Tab by mouth daily. 90 Tab 3    ketorolac (ACULAR) 0.5 % ophthalmic solution       omeprazole (PRILOSEC) 10 mg capsule Take 10 mg by mouth daily.  lisinopril (PRINIVIL, ZESTRIL) 5 mg tablet Take  by mouth daily.  simvastatin (ZOCOR) 20 mg tablet       meclizine (ANTIVERT) 25 mg tablet Take 1 tablet by mouth every 6 hours for the next 3 days. Then stop taking the meclizine. Restart the meclizine for 3 day intervals if vertigo/ dizziness returns.  20 Tab 0    tamsulosin (FLOMAX) 0.4 mg capsule One cap q d pc (Patient not taking: Reported on 1/11/2021) 90 Cap 3    latanoprost (XALATAN) 0.005 % ophthalmic solution Administer 1 Drop to left eye nightly. No Known Allergies    REVIEW OF SYSTEMS    Constitutional: Negative for fever, chills, or weight change. Respiratory: Negative for cough or shortness of breath. Cardiovascular: Negative for chest pain or palpitations. Gastrointestinal: Negative for acid reflux, change in bowel habits, or constipation. Genitourinary: Negative for dysuria and flank pain. Musculoskeletal: Positive for cervical pain. Skin: Negative for rash. Neurological: Negative for headaches or dizziness. Positive for numbness. Endo/Heme/Allergies: Negative for increased bruising. Psychiatric/Behavioral: Negative for difficulty with sleep. As per HPI    PHYSICAL EXAMINATION  Visit Vitals  BP (!) 155/90 (BP 1 Location: Left arm, BP Patient Position: Sitting)   Pulse 82   Temp 97.6 °F (36.4 °C) (Temporal)   Ht 5' 11\" (1.803 m)   Wt 192 lb 12.8 oz (87.5 kg)   SpO2 94%   BMI 26.89 kg/m²       Constitutional: Awake, alert, and in no acute distress. HEENT: Normocephalic. Atraumatic. Oropharynx is moist and clear. PERRL. EOMI. Sclerae are nonicteric  Cardiovascular: Regular rate and rhythm  Lungs: Clear to auscultation bilaterally  Abdomen: Soft and nontender. Bowel sounds are present  Neurological: 1+ symmetrical DTRs in the upper extremities. 1+ symmetrical DTRs in the lower extremities. Sensation to light touch is intact. Negative Trimble's sign bilaterally. Skin: warm, dry, and intact. Musculoskeletal: Good range of motion in the cervical spine on all planes. Pain with abduction of the left shoulder to about 80 degrees; abduction on the let to 90 degrees. Positive impingment sign on the left. Positive empty can on the left; negative on the right. Mild weakness with resisted abduction and adduction on the left. Negative push off test bilaterally. No tenderness to palpation in the lumbar region.  No pain with lumbar extension, axial loading, or forward flexion. Mild decreased range of motion with internal rotation of his left hip. Negative straight leg raise bilaterally. No pain with heel or toe walking. No difficulty with the single leg stance bilaterally.      Biceps  Triceps Deltoids Wrist Ext Wrist Flex Hand Intrin   Right +4/5 +4/5 +4/5 +4/5 +4/5 +4/5   Left +4/5 +4/5 +4/5 +4/5 +4/5 +4/5      Hip Flex  Quads Hamstrings Ankle DF EHL Ankle PF   Right +4/5 +4/5 +4/5 +4/5 +4/5 +4/5   Left +4/5 +4/5 +4/5 +4/5 +4/5 +4/5     IMAGING:    Cervical spine MRI from 12/23/2020 was personally reviewed with the patient and demonstrated:  Results from Hospital Encounter encounter on 12/23/20   MRI CERV SPINE WO CONT    Narrative MRI Cervical Spine     CPT CODE: 10098    INDICATION: Limited cervical range of motion. Cervical spondylosis with  myelopathy.    TECHNIQUE: MRI of the cervical spine performed consisting of sagittal T1, T2 and  inversion recovery sequences with additional axial T2 sequence.     COMPARISON: Plain films 10/1/2014.    FINDINGS:   Sagittal images show mildly straightened cervical lordosis overall, but  otherwise preserved alignment without any significant listhesis.  Moderate to severe intervertebral disc space narrowing C5-6 with mild spurring.  Mild diffuse disc space narrowing C6-7.  No vertebral body compression deformity or edema.    Ovoid well marginated 1.4 cm high T1 and T2 signal lesion within the midline T1  loses signal on inversion recovery, most consistent with hemangioma.    Normal signal in the cervical cord. No expansion or syrinx.  Diffuse a lobulated low T2 signal along the midline vertebral bodies to  intervertebral disc levels. Could reflect component of calcification versus  confluence of disc bulge/protrusion..    Correlation With Axial Images Shows:  C2-3:  Findings suggest mild central disc protrusion, which effaces fluid space  but does not contact the cervical cord. No significant canal narrowing. Mild  right foraminal  encroachment due to uncovertebral spur. C3-4:  Broad-based posterior disc bulge with some endplate osteophytes. Ventral  fluid space is effaced, with questionable possible cord contact. Mild central  canal narrowing to 9 mm. Mild left foraminal encroachment due predominantly to  hypertrophy of the left facet joints. C4-5:  Irregular broad-based posterior disc bulge with endplate spur complex. This contacts the ventral cervical cord and results in mild distortion midline  and right paramedian. Overall mild to moderate central canal narrowing to 8 mm. Very mild left foraminal narrowing due to uncovertebral spur predominantly. C5-6:  Broad-based disc osteophyte complex effaces fluid space and slightly  flattens the ventral cervical cord. No edema. Moderate central canal narrowing  to 8 mm. Moderate to severe right foraminal narrowing due to asymmetric  uncovertebral spur predominantly. Disc bulge may contribute. C6-7:  Broad-based disc osteophyte complex, with mild central canal narrowing to  9 mm. Questionable cord contact but no distortion. Mild right and left foraminal  narrowing due to uncovertebral spur. C7-T1:  Minimal disc bulge. No significant canal narrowing. Mild right foraminal  encroachment due to spur. Impression IMPRESSION:  1. Preserved vertebral alignment without compression deformity or appreciable  edema. 2. Multilevel central canal stenosis up to moderate degree due to disc  osteophyte complex as above. Mild ventral cord contact and distortion several  levels, without edema. .    3. Varying degrees of bilateral neural foraminal stenosis at several levels as  outlined above. Left upper extremity EMG from 12/28/2020 was personally reviewed with the patient and demonstrated:  Interpretation Summary: This EMG study, as performed, is suggestive of a chronic multilevel cervical radiculopathy.       Written by Sanya Sal, as dictated by Adria Manzo MD.  Dr. VIVIANA Jasiel Valencia confirm that all documentation is accurate. Statement Selected

## 2023-06-14 PROBLEM — N40.2 NODULAR PROSTATE: Status: ACTIVE | Noted: 2023-06-14

## 2024-05-23 ENCOUNTER — HOSPITAL ENCOUNTER (OUTPATIENT)
Facility: HOSPITAL | Age: 79
Discharge: HOME | End: 2024-05-23
Payer: MEDICARE

## 2024-05-23 DIAGNOSIS — M85.88 OTHER SPECIFIED DISORDERS OF BONE DENSITY AND STRUCTURE, OTHER SITE: ICD-10-CM

## 2024-05-23 PROCEDURE — 77080 DXA BONE DENSITY AXIAL: CPT

## 2024-07-09 RX ORDER — PANTOPRAZOLE SODIUM 40 MG/1
40 TABLET, DELAYED RELEASE ORAL DAILY
COMMUNITY

## 2024-07-09 NOTE — PROGRESS NOTES
Instructions for your procedure at [facility]      Today's Date: 7/9/2024      Patient's Name: Edward Ortiz Jr      Procedure Date: 7/11/2024        Please enter the main entrance of the hospital and check-in at the  located in the lobby.      Do NOT eat or drink anything, including candy, gum, or ice chips after midnight prior to your procedure, unless it is part of your prep.  Brush your teeth before coming to the hospital.You may swish with water, but do not swallow.  No smoking/Vaping/E-Cigarettes 24 hours prior to the day of procedure.  No alcohol 24 hours prior to the day of procedure.  No recreational drugs for one week prior to the day of procedure.  Bring Photo ID, Insurance information, and Co-pay if required on day of procedure.  Bring in pertinent legal documents, such as, Medical Power of , DNR, Advance Directive, etc.  Leave all other valuables, including money/purse, at home.  Remove jewelry, including ALL body piercings, nail polish, acrylic nails, and makeup (including mascara); no lotions, powders, deodorant, and/or perfume/cologne/after shave on the skin.  Glasses and dentures may be worn to the hospital.  They must be removed prior to procedure. Please bring case/container for glasses or dentures.  11. Contacts should not be worn on day of procedure.   12. Call the office ([phone]) if you have symptoms of a cold or illness within 24-48 hours prior to your procedure.   13. AN ADULT (relative or friend 18 years or older) MUST DRIVE YOU HOME AFTER YOUR PROCEDURE.   14. Please make arrangements for a responsible adult (18 years or older) to be with you for 24 hours after your procedure.   15. TWO VISITORS will be allowed in the waiting area during your procedure.       Special Instructions:      Bring list of CURRENT medications.  Follow instructions from the office regarding Bowel Prep, Vitamins, Iron, Blood Thinners, Insulin, Seizure, and Blood

## 2024-07-10 ENCOUNTER — ANESTHESIA EVENT (OUTPATIENT)
Facility: HOSPITAL | Age: 79
End: 2024-07-10
Payer: MEDICARE

## 2024-07-11 ENCOUNTER — APPOINTMENT (OUTPATIENT)
Facility: HOSPITAL | Age: 79
DRG: 310 | End: 2024-07-11
Payer: MEDICARE

## 2024-07-11 ENCOUNTER — HOSPITAL ENCOUNTER (INPATIENT)
Facility: HOSPITAL | Age: 79
LOS: 1 days | Discharge: HOME | DRG: 310 | End: 2024-07-12
Attending: STUDENT IN AN ORGANIZED HEALTH CARE EDUCATION/TRAINING PROGRAM | Admitting: INTERNAL MEDICINE
Payer: MEDICARE

## 2024-07-11 ENCOUNTER — ANESTHESIA (OUTPATIENT)
Facility: HOSPITAL | Age: 79
End: 2024-07-11
Payer: MEDICARE

## 2024-07-11 ENCOUNTER — HOSPITAL ENCOUNTER (OUTPATIENT)
Facility: HOSPITAL | Age: 79
Setting detail: OUTPATIENT SURGERY
Discharge: ANOTHER HOSPITAL | DRG: 310 | End: 2024-07-11
Attending: INTERNAL MEDICINE | Admitting: INTERNAL MEDICINE
Payer: MEDICARE

## 2024-07-11 VITALS
BODY MASS INDEX: 24.32 KG/M2 | WEIGHT: 173.7 LBS | TEMPERATURE: 97.9 F | SYSTOLIC BLOOD PRESSURE: 144 MMHG | DIASTOLIC BLOOD PRESSURE: 84 MMHG | HEART RATE: 126 BPM | HEIGHT: 71 IN | OXYGEN SATURATION: 98 % | RESPIRATION RATE: 19 BRPM

## 2024-07-11 DIAGNOSIS — I48.91 NEW ONSET ATRIAL FIBRILLATION (HCC): ICD-10-CM

## 2024-07-11 DIAGNOSIS — I48.91 ATRIAL FIBRILLATION WITH RAPID VENTRICULAR RESPONSE (HCC): Primary | ICD-10-CM

## 2024-07-11 DIAGNOSIS — I48.91 ATRIAL FIBRILLATION, UNSPECIFIED TYPE (HCC): ICD-10-CM

## 2024-07-11 PROBLEM — M48.00 SPINAL STENOSIS: Status: ACTIVE | Noted: 2024-07-11

## 2024-07-11 PROBLEM — M48.00 SPINAL STENOSIS: Chronic | Status: ACTIVE | Noted: 2024-07-11

## 2024-07-11 PROBLEM — M35.3 PMR (POLYMYALGIA RHEUMATICA) (HCC): Status: ACTIVE | Noted: 2024-07-11

## 2024-07-11 PROBLEM — Z85.51 HISTORY OF BLADDER CANCER: Chronic | Status: ACTIVE | Noted: 2024-07-11

## 2024-07-11 PROBLEM — N40.0 BPH (BENIGN PROSTATIC HYPERPLASIA): Status: ACTIVE | Noted: 2024-07-11

## 2024-07-11 PROBLEM — K21.9 GERD (GASTROESOPHAGEAL REFLUX DISEASE): Status: ACTIVE | Noted: 2024-07-11

## 2024-07-11 PROBLEM — N40.0 BPH (BENIGN PROSTATIC HYPERPLASIA): Chronic | Status: ACTIVE | Noted: 2024-07-11

## 2024-07-11 PROBLEM — I10 PRIMARY HYPERTENSION: Chronic | Status: ACTIVE | Noted: 2024-07-11

## 2024-07-11 PROBLEM — M35.3 PMR (POLYMYALGIA RHEUMATICA) (HCC): Chronic | Status: ACTIVE | Noted: 2024-07-11

## 2024-07-11 PROBLEM — Z85.51 HISTORY OF BLADDER CANCER: Status: ACTIVE | Noted: 2024-07-11

## 2024-07-11 PROBLEM — K21.9 GERD (GASTROESOPHAGEAL REFLUX DISEASE): Chronic | Status: ACTIVE | Noted: 2024-07-11

## 2024-07-11 PROBLEM — I10 HYPERTENSION: Status: ACTIVE | Noted: 2024-07-11

## 2024-07-11 LAB
ALBUMIN SERPL-MCNC: 2.8 G/DL (ref 3.4–5)
ALBUMIN/GLOB SERPL: 0.7 (ref 0.8–1.7)
ALP SERPL-CCNC: 88 U/L (ref 45–117)
ALT SERPL-CCNC: 53 U/L (ref 16–61)
ANION GAP SERPL CALC-SCNC: 5 MMOL/L (ref 3–18)
APTT PPP: 23.3 SEC (ref 23–36.4)
APTT PPP: 50.8 SEC (ref 23–36.4)
APTT PPP: 68.4 SEC (ref 23–36.4)
AST SERPL-CCNC: 39 U/L (ref 10–38)
BASOPHILS # BLD: 0.1 K/UL (ref 0–0.1)
BASOPHILS NFR BLD: 1 % (ref 0–2)
BILIRUB DIRECT SERPL-MCNC: 0.1 MG/DL (ref 0–0.2)
BILIRUB SERPL-MCNC: 0.4 MG/DL (ref 0.2–1)
BUN SERPL-MCNC: 31 MG/DL (ref 7–18)
BUN/CREAT SERPL: 19 (ref 12–20)
CALCIUM SERPL-MCNC: 9.9 MG/DL (ref 8.5–10.1)
CHLORIDE SERPL-SCNC: 109 MMOL/L (ref 100–111)
CO2 SERPL-SCNC: 26 MMOL/L (ref 21–32)
CREAT SERPL-MCNC: 1.59 MG/DL (ref 0.6–1.3)
DIFFERENTIAL METHOD BLD: ABNORMAL
EKG ATRIAL RATE: 122 BPM
EKG DIAGNOSIS: NORMAL
EKG Q-T INTERVAL: 320 MS
EKG QRS DURATION: 80 MS
EKG QTC CALCULATION (BAZETT): 446 MS
EKG R AXIS: 59 DEGREES
EKG T AXIS: -18 DEGREES
EKG VENTRICULAR RATE: 117 BPM
EOSINOPHIL # BLD: 0.3 K/UL (ref 0–0.4)
EOSINOPHIL NFR BLD: 3 % (ref 0–5)
ERYTHROCYTE [DISTWIDTH] IN BLOOD BY AUTOMATED COUNT: 13.7 % (ref 11.6–14.5)
ERYTHROCYTE [DISTWIDTH] IN BLOOD BY AUTOMATED COUNT: 13.8 % (ref 11.6–14.5)
GLOBULIN SER CALC-MCNC: 4 G/DL (ref 2–4)
GLUCOSE SERPL-MCNC: 91 MG/DL (ref 74–99)
HCT VFR BLD AUTO: 39 % (ref 36–48)
HCT VFR BLD AUTO: 41.4 % (ref 36–48)
HGB BLD-MCNC: 12.5 G/DL (ref 13–16)
HGB BLD-MCNC: 13.3 G/DL (ref 13–16)
IMM GRANULOCYTES # BLD AUTO: 0 K/UL (ref 0–0.04)
IMM GRANULOCYTES NFR BLD AUTO: 0 % (ref 0–0.5)
LYMPHOCYTES # BLD: 1.7 K/UL (ref 0.9–3.6)
LYMPHOCYTES NFR BLD: 16 % (ref 21–52)
MAGNESIUM SERPL-MCNC: 2.4 MG/DL (ref 1.6–2.6)
MCH RBC QN AUTO: 30.7 PG (ref 24–34)
MCH RBC QN AUTO: 31.2 PG (ref 24–34)
MCHC RBC AUTO-ENTMCNC: 32.1 G/DL (ref 31–37)
MCHC RBC AUTO-ENTMCNC: 32.1 G/DL (ref 31–37)
MCV RBC AUTO: 95.6 FL (ref 78–100)
MCV RBC AUTO: 97.3 FL (ref 78–100)
MONOCYTES # BLD: 0.7 K/UL (ref 0.05–1.2)
MONOCYTES NFR BLD: 7 % (ref 3–10)
NEUTS SEG # BLD: 7.6 K/UL (ref 1.8–8)
NEUTS SEG NFR BLD: 73 % (ref 40–73)
NRBC # BLD: 0 K/UL (ref 0–0.01)
NRBC # BLD: 0 K/UL (ref 0–0.01)
NRBC BLD-RTO: 0 PER 100 WBC
NRBC BLD-RTO: 0 PER 100 WBC
PLATELET # BLD AUTO: 185 K/UL (ref 135–420)
PLATELET # BLD AUTO: 230 K/UL (ref 135–420)
PMV BLD AUTO: 11.5 FL (ref 9.2–11.8)
PMV BLD AUTO: 11.7 FL (ref 9.2–11.8)
POTASSIUM SERPL-SCNC: 4 MMOL/L (ref 3.5–5.5)
PROT SERPL-MCNC: 6.8 G/DL (ref 6.4–8.2)
RBC # BLD AUTO: 4.01 M/UL (ref 4.35–5.65)
RBC # BLD AUTO: 4.33 M/UL (ref 4.35–5.65)
SODIUM SERPL-SCNC: 140 MMOL/L (ref 136–145)
TROPONIN I SERPL HS-MCNC: 5 NG/L (ref 0–78)
TSH SERPL DL<=0.05 MIU/L-ACNC: 1.01 UIU/ML (ref 0.36–3.74)
WBC # BLD AUTO: 10.4 K/UL (ref 4.6–13.2)
WBC # BLD AUTO: 9.6 K/UL (ref 4.6–13.2)

## 2024-07-11 PROCEDURE — 0DB68ZX EXCISION OF STOMACH, VIA NATURAL OR ARTIFICIAL OPENING ENDOSCOPIC, DIAGNOSTIC: ICD-10-PCS | Performed by: INTERNAL MEDICINE

## 2024-07-11 PROCEDURE — 3600007512: Performed by: INTERNAL MEDICINE

## 2024-07-11 PROCEDURE — 0DB58ZX EXCISION OF ESOPHAGUS, VIA NATURAL OR ARTIFICIAL OPENING ENDOSCOPIC, DIAGNOSTIC: ICD-10-PCS | Performed by: INTERNAL MEDICINE

## 2024-07-11 PROCEDURE — 71045 X-RAY EXAM CHEST 1 VIEW: CPT

## 2024-07-11 PROCEDURE — 2500000003 HC RX 250 WO HCPCS: Performed by: INTERNAL MEDICINE

## 2024-07-11 PROCEDURE — G0378 HOSPITAL OBSERVATION PER HR: HCPCS

## 2024-07-11 PROCEDURE — 6360000002 HC RX W HCPCS: Performed by: ANESTHESIOLOGY

## 2024-07-11 PROCEDURE — 80076 HEPATIC FUNCTION PANEL: CPT

## 2024-07-11 PROCEDURE — 85027 COMPLETE CBC AUTOMATED: CPT

## 2024-07-11 PROCEDURE — 36415 COLL VENOUS BLD VENIPUNCTURE: CPT

## 2024-07-11 PROCEDURE — 2580000003 HC RX 258: Performed by: INTERNAL MEDICINE

## 2024-07-11 PROCEDURE — 2500000003 HC RX 250 WO HCPCS: Performed by: ANESTHESIOLOGY

## 2024-07-11 PROCEDURE — 2709999900 HC NON-CHARGEABLE SUPPLY: Performed by: INTERNAL MEDICINE

## 2024-07-11 PROCEDURE — 94761 N-INVAS EAR/PLS OXIMETRY MLT: CPT

## 2024-07-11 PROCEDURE — 6360000002 HC RX W HCPCS: Performed by: STUDENT IN AN ORGANIZED HEALTH CARE EDUCATION/TRAINING PROGRAM

## 2024-07-11 PROCEDURE — 96365 THER/PROPH/DIAG IV INF INIT: CPT

## 2024-07-11 PROCEDURE — 88305 TISSUE EXAM BY PATHOLOGIST: CPT

## 2024-07-11 PROCEDURE — 3700000000 HC ANESTHESIA ATTENDED CARE: Performed by: INTERNAL MEDICINE

## 2024-07-11 PROCEDURE — 7100000010 HC PHASE II RECOVERY - FIRST 15 MIN: Performed by: INTERNAL MEDICINE

## 2024-07-11 PROCEDURE — 7100000001 HC PACU RECOVERY - ADDTL 15 MIN: Performed by: INTERNAL MEDICINE

## 2024-07-11 PROCEDURE — 1100000000 HC RM PRIVATE

## 2024-07-11 PROCEDURE — 96376 TX/PRO/DX INJ SAME DRUG ADON: CPT

## 2024-07-11 PROCEDURE — 99223 1ST HOSP IP/OBS HIGH 75: CPT | Performed by: INTERNAL MEDICINE

## 2024-07-11 PROCEDURE — 99222 1ST HOSP IP/OBS MODERATE 55: CPT | Performed by: INTERNAL MEDICINE

## 2024-07-11 PROCEDURE — 84443 ASSAY THYROID STIM HORMONE: CPT

## 2024-07-11 PROCEDURE — 3700000001 HC ADD 15 MINUTES (ANESTHESIA): Performed by: INTERNAL MEDICINE

## 2024-07-11 PROCEDURE — 2580000003 HC RX 258: Performed by: NURSE ANESTHETIST, CERTIFIED REGISTERED

## 2024-07-11 PROCEDURE — 7100000011 HC PHASE II RECOVERY - ADDTL 15 MIN: Performed by: INTERNAL MEDICINE

## 2024-07-11 PROCEDURE — 96375 TX/PRO/DX INJ NEW DRUG ADDON: CPT

## 2024-07-11 PROCEDURE — 83735 ASSAY OF MAGNESIUM: CPT

## 2024-07-11 PROCEDURE — 96374 THER/PROPH/DIAG INJ IV PUSH: CPT

## 2024-07-11 PROCEDURE — 93005 ELECTROCARDIOGRAM TRACING: CPT

## 2024-07-11 PROCEDURE — 99285 EMERGENCY DEPT VISIT HI MDM: CPT

## 2024-07-11 PROCEDURE — 85025 COMPLETE CBC W/AUTO DIFF WBC: CPT

## 2024-07-11 PROCEDURE — 0DB48ZX EXCISION OF ESOPHAGOGASTRIC JUNCTION, VIA NATURAL OR ARTIFICIAL OPENING ENDOSCOPIC, DIAGNOSTIC: ICD-10-PCS | Performed by: INTERNAL MEDICINE

## 2024-07-11 PROCEDURE — 3600007502: Performed by: INTERNAL MEDICINE

## 2024-07-11 PROCEDURE — 0DB98ZX EXCISION OF DUODENUM, VIA NATURAL OR ARTIFICIAL OPENING ENDOSCOPIC, DIAGNOSTIC: ICD-10-PCS | Performed by: INTERNAL MEDICINE

## 2024-07-11 PROCEDURE — 2500000003 HC RX 250 WO HCPCS: Performed by: STUDENT IN AN ORGANIZED HEALTH CARE EDUCATION/TRAINING PROGRAM

## 2024-07-11 PROCEDURE — 85730 THROMBOPLASTIN TIME PARTIAL: CPT

## 2024-07-11 PROCEDURE — 6370000000 HC RX 637 (ALT 250 FOR IP): Performed by: INTERNAL MEDICINE

## 2024-07-11 PROCEDURE — 84484 ASSAY OF TROPONIN QUANT: CPT

## 2024-07-11 PROCEDURE — 7100000000 HC PACU RECOVERY - FIRST 15 MIN: Performed by: INTERNAL MEDICINE

## 2024-07-11 PROCEDURE — 80048 BASIC METABOLIC PNL TOTAL CA: CPT

## 2024-07-11 RX ORDER — LISINOPRIL 10 MG/1
5 TABLET ORAL DAILY
Status: DISCONTINUED | OUTPATIENT
Start: 2024-07-12 | End: 2024-07-12 | Stop reason: HOSPADM

## 2024-07-11 RX ORDER — METOPROLOL TARTRATE 1 MG/ML
5 INJECTION, SOLUTION INTRAVENOUS ONCE
Status: COMPLETED | OUTPATIENT
Start: 2024-07-11 | End: 2024-07-11

## 2024-07-11 RX ORDER — IPRATROPIUM BROMIDE AND ALBUTEROL SULFATE 2.5; .5 MG/3ML; MG/3ML
1 SOLUTION RESPIRATORY (INHALATION) EVERY 4 HOURS PRN
Status: DISCONTINUED | OUTPATIENT
Start: 2024-07-11 | End: 2024-07-12 | Stop reason: HOSPADM

## 2024-07-11 RX ORDER — PANTOPRAZOLE SODIUM 40 MG/1
40 TABLET, DELAYED RELEASE ORAL DAILY
Status: DISCONTINUED | OUTPATIENT
Start: 2024-07-11 | End: 2024-07-12 | Stop reason: HOSPADM

## 2024-07-11 RX ORDER — SODIUM CHLORIDE 0.9 % (FLUSH) 0.9 %
5-40 SYRINGE (ML) INJECTION PRN
Status: DISCONTINUED | OUTPATIENT
Start: 2024-07-11 | End: 2024-07-12 | Stop reason: HOSPADM

## 2024-07-11 RX ORDER — HEPARIN SODIUM 10000 [USP'U]/100ML
5-30 INJECTION, SOLUTION INTRAVENOUS CONTINUOUS
Status: DISCONTINUED | OUTPATIENT
Start: 2024-07-11 | End: 2024-07-12 | Stop reason: HOSPADM

## 2024-07-11 RX ORDER — LATANOPROST 50 UG/ML
1 SOLUTION/ DROPS OPHTHALMIC NIGHTLY
Status: DISCONTINUED | OUTPATIENT
Start: 2024-07-11 | End: 2024-07-12 | Stop reason: HOSPADM

## 2024-07-11 RX ORDER — MAGNESIUM SULFATE IN WATER 40 MG/ML
2000 INJECTION, SOLUTION INTRAVENOUS PRN
Status: DISCONTINUED | OUTPATIENT
Start: 2024-07-11 | End: 2024-07-12 | Stop reason: HOSPADM

## 2024-07-11 RX ORDER — ONDANSETRON 2 MG/ML
4 INJECTION INTRAMUSCULAR; INTRAVENOUS EVERY 6 HOURS PRN
Status: DISCONTINUED | OUTPATIENT
Start: 2024-07-11 | End: 2024-07-12 | Stop reason: HOSPADM

## 2024-07-11 RX ORDER — PROPOFOL 10 MG/ML
INJECTION, EMULSION INTRAVENOUS PRN
Status: DISCONTINUED | OUTPATIENT
Start: 2024-07-11 | End: 2024-07-11 | Stop reason: SDUPTHER

## 2024-07-11 RX ORDER — ONDANSETRON 4 MG/1
4 TABLET, ORALLY DISINTEGRATING ORAL EVERY 8 HOURS PRN
Status: DISCONTINUED | OUTPATIENT
Start: 2024-07-11 | End: 2024-07-12 | Stop reason: HOSPADM

## 2024-07-11 RX ORDER — SODIUM CHLORIDE 9 MG/ML
INJECTION, SOLUTION INTRAVENOUS PRN
Status: DISCONTINUED | OUTPATIENT
Start: 2024-07-11 | End: 2024-07-12 | Stop reason: HOSPADM

## 2024-07-11 RX ORDER — HEPARIN SODIUM 1000 [USP'U]/ML
2000 INJECTION, SOLUTION INTRAVENOUS; SUBCUTANEOUS PRN
Status: DISCONTINUED | OUTPATIENT
Start: 2024-07-11 | End: 2024-07-12 | Stop reason: HOSPADM

## 2024-07-11 RX ORDER — POTASSIUM CHLORIDE 7.45 MG/ML
10 INJECTION INTRAVENOUS PRN
Status: DISCONTINUED | OUTPATIENT
Start: 2024-07-11 | End: 2024-07-11 | Stop reason: SDUPTHER

## 2024-07-11 RX ORDER — SODIUM CHLORIDE 0.9 % (FLUSH) 0.9 %
5-40 SYRINGE (ML) INJECTION EVERY 12 HOURS SCHEDULED
Status: DISCONTINUED | OUTPATIENT
Start: 2024-07-11 | End: 2024-07-12 | Stop reason: HOSPADM

## 2024-07-11 RX ORDER — PANTOPRAZOLE SODIUM 40 MG/1
40 TABLET, DELAYED RELEASE ORAL DAILY
Status: DISCONTINUED | OUTPATIENT
Start: 2024-07-12 | End: 2024-07-11

## 2024-07-11 RX ORDER — EZETIMIBE 10 MG/1
10 TABLET ORAL DAILY
Status: DISCONTINUED | OUTPATIENT
Start: 2024-07-12 | End: 2024-07-12 | Stop reason: HOSPADM

## 2024-07-11 RX ORDER — POLYETHYLENE GLYCOL 3350 17 G/17G
17 POWDER, FOR SOLUTION ORAL DAILY PRN
Status: DISCONTINUED | OUTPATIENT
Start: 2024-07-11 | End: 2024-07-12 | Stop reason: HOSPADM

## 2024-07-11 RX ORDER — LIDOCAINE HYDROCHLORIDE 20 MG/ML
INJECTION, SOLUTION EPIDURAL; INFILTRATION; INTRACAUDAL; PERINEURAL PRN
Status: DISCONTINUED | OUTPATIENT
Start: 2024-07-11 | End: 2024-07-11 | Stop reason: SDUPTHER

## 2024-07-11 RX ORDER — FINASTERIDE 5 MG/1
5 TABLET, FILM COATED ORAL DAILY
Status: DISCONTINUED | OUTPATIENT
Start: 2024-07-11 | End: 2024-07-12 | Stop reason: HOSPADM

## 2024-07-11 RX ORDER — METOPROLOL TARTRATE 1 MG/ML
5 INJECTION, SOLUTION INTRAVENOUS ONCE
Status: DISCONTINUED | OUTPATIENT
Start: 2024-07-11 | End: 2024-07-11

## 2024-07-11 RX ORDER — ACETAMINOPHEN 325 MG/1
650 TABLET ORAL EVERY 6 HOURS PRN
Status: DISCONTINUED | OUTPATIENT
Start: 2024-07-11 | End: 2024-07-12 | Stop reason: HOSPADM

## 2024-07-11 RX ORDER — POTASSIUM CHLORIDE 7.45 MG/ML
10 INJECTION INTRAVENOUS PRN
Status: DISCONTINUED | OUTPATIENT
Start: 2024-07-11 | End: 2024-07-12 | Stop reason: HOSPADM

## 2024-07-11 RX ORDER — MECOBALAMIN 5000 MCG
5 TABLET,DISINTEGRATING ORAL NIGHTLY PRN
Status: DISCONTINUED | OUTPATIENT
Start: 2024-07-11 | End: 2024-07-12 | Stop reason: HOSPADM

## 2024-07-11 RX ORDER — SODIUM CHLORIDE, SODIUM LACTATE, POTASSIUM CHLORIDE, CALCIUM CHLORIDE 600; 310; 30; 20 MG/100ML; MG/100ML; MG/100ML; MG/100ML
INJECTION, SOLUTION INTRAVENOUS CONTINUOUS
Status: DISCONTINUED | OUTPATIENT
Start: 2024-07-11 | End: 2024-07-11 | Stop reason: HOSPADM

## 2024-07-11 RX ORDER — HEPARIN SODIUM 1000 [USP'U]/ML
4000 INJECTION, SOLUTION INTRAVENOUS; SUBCUTANEOUS PRN
Status: DISCONTINUED | OUTPATIENT
Start: 2024-07-11 | End: 2024-07-12 | Stop reason: HOSPADM

## 2024-07-11 RX ORDER — POTASSIUM CHLORIDE 20 MEQ/1
40 TABLET, EXTENDED RELEASE ORAL PRN
Status: DISCONTINUED | OUTPATIENT
Start: 2024-07-11 | End: 2024-07-12 | Stop reason: HOSPADM

## 2024-07-11 RX ORDER — SODIUM CHLORIDE 0.9 % (FLUSH) 0.9 %
5-40 SYRINGE (ML) INJECTION PRN
Status: DISCONTINUED | OUTPATIENT
Start: 2024-07-11 | End: 2024-07-11 | Stop reason: HOSPADM

## 2024-07-11 RX ORDER — CALCIUM CARBONATE 500 MG/1
500 TABLET, CHEWABLE ORAL 3 TIMES DAILY PRN
Status: DISCONTINUED | OUTPATIENT
Start: 2024-07-11 | End: 2024-07-12 | Stop reason: HOSPADM

## 2024-07-11 RX ORDER — LIDOCAINE HYDROCHLORIDE 10 MG/ML
1 INJECTION, SOLUTION EPIDURAL; INFILTRATION; INTRACAUDAL; PERINEURAL
Status: DISCONTINUED | OUTPATIENT
Start: 2024-07-11 | End: 2024-07-11 | Stop reason: HOSPADM

## 2024-07-11 RX ORDER — ACETAMINOPHEN 650 MG/1
650 SUPPOSITORY RECTAL EVERY 6 HOURS PRN
Status: DISCONTINUED | OUTPATIENT
Start: 2024-07-11 | End: 2024-07-12 | Stop reason: HOSPADM

## 2024-07-11 RX ORDER — METOPROLOL TARTRATE 1 MG/ML
5 INJECTION, SOLUTION INTRAVENOUS
Status: DISCONTINUED | OUTPATIENT
Start: 2024-07-11 | End: 2024-07-12 | Stop reason: HOSPADM

## 2024-07-11 RX ORDER — HEPARIN SODIUM 1000 [USP'U]/ML
4000 INJECTION, SOLUTION INTRAVENOUS; SUBCUTANEOUS ONCE
Status: COMPLETED | OUTPATIENT
Start: 2024-07-11 | End: 2024-07-11

## 2024-07-11 RX ADMIN — SODIUM CHLORIDE, POTASSIUM CHLORIDE, SODIUM LACTATE AND CALCIUM CHLORIDE: 600; 310; 30; 20 INJECTION, SOLUTION INTRAVENOUS at 07:01

## 2024-07-11 RX ADMIN — PROPOFOL 100 MG: 10 INJECTION, EMULSION INTRAVENOUS at 07:36

## 2024-07-11 RX ADMIN — METOPROLOL TARTRATE 5 MG: 5 INJECTION INTRAVENOUS at 10:31

## 2024-07-11 RX ADMIN — HEPARIN SODIUM 2000 UNITS: 1000 INJECTION INTRAVENOUS; SUBCUTANEOUS at 23:42

## 2024-07-11 RX ADMIN — SODIUM CHLORIDE 5 MG/HR: 900 INJECTION, SOLUTION INTRAVENOUS at 10:48

## 2024-07-11 RX ADMIN — SODIUM CHLORIDE, PRESERVATIVE FREE 10 ML: 5 INJECTION INTRAVENOUS at 21:30

## 2024-07-11 RX ADMIN — PROPOFOL 25 MG: 10 INJECTION, EMULSION INTRAVENOUS at 07:44

## 2024-07-11 RX ADMIN — PROPOFOL 25 MG: 10 INJECTION, EMULSION INTRAVENOUS at 07:38

## 2024-07-11 RX ADMIN — FINASTERIDE 5 MG: 5 TABLET, FILM COATED ORAL at 22:02

## 2024-07-11 RX ADMIN — HEPARIN SODIUM 12 UNITS/KG/HR: 10000 INJECTION, SOLUTION INTRAVENOUS at 10:41

## 2024-07-11 RX ADMIN — METOPROLOL TARTRATE 5 MG: 5 INJECTION INTRAVENOUS at 09:28

## 2024-07-11 RX ADMIN — LIDOCAINE HYDROCHLORIDE 100 MG: 20 INJECTION, SOLUTION EPIDURAL; INFILTRATION; INTRACAUDAL; PERINEURAL at 07:36

## 2024-07-11 RX ADMIN — HEPARIN SODIUM 15 UNITS/KG/HR: 10000 INJECTION, SOLUTION INTRAVENOUS at 23:47

## 2024-07-11 RX ADMIN — METOPROLOL TARTRATE 5 MG: 5 INJECTION INTRAVENOUS at 08:55

## 2024-07-11 RX ADMIN — PROPOFOL 25 MG: 10 INJECTION, EMULSION INTRAVENOUS at 07:40

## 2024-07-11 RX ADMIN — PROPOFOL 25 MG: 10 INJECTION, EMULSION INTRAVENOUS at 07:42

## 2024-07-11 RX ADMIN — PANTOPRAZOLE SODIUM 40 MG: 40 TABLET, DELAYED RELEASE ORAL at 22:02

## 2024-07-11 RX ADMIN — HEPARIN SODIUM 4000 UNITS: 1000 INJECTION INTRAVENOUS; SUBCUTANEOUS at 10:37

## 2024-07-11 ASSESSMENT — LIFESTYLE VARIABLES
HOW MANY STANDARD DRINKS CONTAINING ALCOHOL DO YOU HAVE ON A TYPICAL DAY: 1 OR 2
HOW OFTEN DO YOU HAVE A DRINK CONTAINING ALCOHOL: MONTHLY OR LESS

## 2024-07-11 ASSESSMENT — PAIN - FUNCTIONAL ASSESSMENT
PAIN_FUNCTIONAL_ASSESSMENT: NONE - DENIES PAIN
PAIN_FUNCTIONAL_ASSESSMENT: NONE - DENIES PAIN

## 2024-07-11 ASSESSMENT — PAIN SCALES - GENERAL
PAINLEVEL_OUTOF10: 0

## 2024-07-11 NOTE — CARE COORDINATION
07/11/24 1623   Service Assessment   Patient Orientation Alert and Oriented   Cognition Alert   History Provided By Patient   Primary Caregiver Self   Support Systems Spouse/Significant Other   PCP Verified by CM Yes   Last Visit to PCP Within last 3 months   Prior Functional Level Independent in ADLs/IADLs   Current Functional Level Independent in ADLs/IADLs   Can patient return to prior living arrangement Yes   Family able to assist with home care needs: Yes   Would you like for me to discuss the discharge plan with any other family members/significant others, and if so, who? Yes   Financial Resources Medicare   Community Resources None   Social/Functional History   Lives With Spouse   Type of Home House   Home Layout Two level;Able to Live on Main level with bedroom/bathroom   Home Access Stairs to enter with rails   Entrance Stairs - Number of Steps 3   Entrance Stairs - Rails Both   Bathroom Shower/Tub Tub/Shower unit;Walk-in shower   Bathroom Toilet Standard   Bathroom Equipment None   Bathroom Accessibility Accessible   Home Equipment None   Receives Help From Family   ADL Assistance Independent   Homemaking Assistance Independent   Homemaking Responsibilities Yes   Ambulation Assistance Independent   Transfer Assistance Independent   Active  Yes   Mode of Transportation Car   Occupation Retired   Discharge Planning   Type of Residence House   Living Arrangements Spouse/Significant Other   Current Services Prior To Admission None   Potential Assistance Needed N/A   DME Ordered? No   Potential Assistance Purchasing Medications No   Type of Home Care Services None   Patient expects to be discharged to: House   One/Two Story Residence Two story   # of Interior Steps 12   Interior Rails Right   Lift Chair Available No   History of falls? 0   Services At/After Discharge   Transition of Care Consult (CM Consult) N/A   Services At/After Discharge None   [place] Resource Information Provided? No   Mode of  Transport at Discharge   (spouse)   Confirm Follow Up Transport Family   Condition of Participation: Discharge Planning   The Plan for Transition of Care is related to the following treatment goals: Plan is for patient to return home with spouse.   The Patient and/or Patient Representative was provided with a Choice of Provider?   (NA)   Freedom of Choice list was provided with basic dialogue that supports the patient's individualized plan of care/goals, treatment preferences, and shares the quality data associated with the providers?  No     Jessica SMITHW   Case Management

## 2024-07-11 NOTE — PROGRESS NOTES
Pt is alert and oriented x 4.  Iv infusions continue with dual signoff. Skin intact with Lakisha RN as second nurse. Education provided with medications, heparin and cardizem. Poor appetite. Bedside commode and urinal utilized. Wife at bedside.

## 2024-07-11 NOTE — ANESTHESIA POSTPROCEDURE EVALUATION
Department of Anesthesiology  Postprocedure Note    Patient: Edward Ortiz Jr  MRN: [id number]  YOB: 1945  Date of evaluation: 7/11/2024    Procedure Summary       Date: 07/11/24 Room / Location: [facility] ENDO 02 / [facility] ENDOSCOPY    Anesthesia Start: 0732 Anesthesia Stop: 0758    Procedure: ESOPHAGOGASTRODUODENOSCOPY w/Bx's (Upper GI Region) Diagnosis:       Change in bowel habits      Bloating      Abdominal pain, epigastric      Gastroesophageal reflux disease, unspecified whether esophagitis present      (Change in bowel habits [R19.4])      (Bloating [R14.0])      (Abdominal pain, epigastric [R10.13])      (Gastroesophageal reflux disease, unspecified whether esophagitis present [K21.9])    Surgeons: William Ramirez MD Responsible Provider: Drew Rubio MD    Anesthesia Type: MAC ASA Status: 3            Anesthesia Type: MAC    Yary Phase I: Yary Score: 9    Yary Phase II: Yary Score: 10    Anesthesia Post Evaluation    Patient location during evaluation: PACU  Patient participation: complete - patient participated  Level of consciousness: awake  Airway patency: patent  Nausea & Vomiting: no nausea  Cardiovascular status: hemodynamically stable  Respiratory status: acceptable  Hydration status: euvolemic  Pain management: adequate    No notable events documented.

## 2024-07-11 NOTE — CARE COORDINATION
07/11/24 1623   Service Assessment   Patient Orientation Alert and Oriented   Cognition Alert   History Provided By Patient   Primary Caregiver Self   Support Systems Spouse/Significant Other   PCP Verified by CM Yes   Last Visit to PCP Within last 3 months   Prior Functional Level Independent in ADLs/IADLs   Current Functional Level Independent in ADLs/IADLs   Can patient return to prior living arrangement Yes   Family able to assist with home care needs: Yes   Would you like for me to discuss the discharge plan with any other family members/significant others, and if so, who? Yes   Financial Resources Medicare   Community Resources None   Social/Functional History   Lives With Spouse   Type of Home House   Home Layout Two level;Able to Live on Main level with bedroom/bathroom   Home Access Stairs to enter with rails   Entrance Stairs - Number of Steps 3   Entrance Stairs - Rails Both   Bathroom Shower/Tub Tub/Shower unit;Walk-in shower   Bathroom Toilet Standard   Bathroom Equipment None   Bathroom Accessibility Accessible   Home Equipment None   Receives Help From Family   ADL Assistance Independent   Homemaking Assistance Independent   Homemaking Responsibilities Yes   Ambulation Assistance Independent   Transfer Assistance Independent   Active  Yes   Mode of Transportation Car   Occupation Retired   Discharge Planning   Type of Residence House   Living Arrangements Spouse/Significant Other   Current Services Prior To Admission None   Potential Assistance Needed N/A   DME Ordered? No   Potential Assistance Purchasing Medications No   Type of Home Care Services None   Patient expects to be discharged to: House   One/Two Story Residence Two story   # of Interior Steps 12   Interior Rails Right   Lift Chair Available No   History of falls? 0   Services At/After Discharge   Transition of Care Consult (CM Consult) N/A   Services At/After Discharge None   [place] Resource Information Provided? No   Mode of

## 2024-07-11 NOTE — SIGNIFICANT EVENT
INTERIM UPDATE - 1408 EST on 7/11/2024    Called back and spoke with Nursing Staff regarding Patient that was sent to Telemetry Floor on IV Diltiazem gtt (which is not allowed).  Had Initially called, but Patient had not be placed on Telemetry to know whether or not Patient's Atrial Fibrillation with RVR was under control.  Heart Rate is reportedly 75 bpm x2 measurements.    Plan:  Ordered IV Diltiazem gtt to Fixed-Rate IV Diltiazem Infusion 5 mg/hr with hold-parameters for Hypotension or Bradycardia.  Nursing Staff was given explicit instructions regarding hold-parameters and also was informed about PRN IV Metoprolol tartrate.

## 2024-07-11 NOTE — PROGRESS NOTES
4 Eyes Skin Assessment     NAME:  Edward Ortiz Jr  YOB: 1945  MEDICAL RECORD NUMBER:  [id number]    The patient is being assessed for  Shift Handoff    I agree that at least one RN has performed a thorough Head to Toe Skin Assessment on the patient. ALL assessment sites listed below have been assessed.      Areas assessed by both nurses:    Head, Face, Ears, Arms, Elbows, Hands, and Legs. Feet and Heels        Does the Patient have a Wound? No noted wound(s)       Wu Prevention initiated by RN: No  Wound Care Orders initiated by RN: No    Pressure Injury (Stage 3,4, Unstageable, DTI, NWPT, and Complex wounds) if present, place Wound referral order by RN under : No    New Ostomies, if present place, Ostomy referral order under : No     Nurse 1 eSignature: Electronically signed by Samantha Guaman RN on 7/11/24 at 7:05 PM EDT    **SHARE this note so that the co-signing nurse can place an eSignature**    Nurse 2 eSignature: Electronically signed by Kaylynn Marcus RN on 7/12/24 at 4:16 AM EDT

## 2024-07-11 NOTE — H&P
GASTROENTEROLOGY Pre-Procedure H and P      Impression/Plan:   1. This patient is consented for an EGD for Abdominal pain, epigastric    Addendum: All lab tests orders pertaining to the procedure have been ordered by the anesthesia personnel and results will be addressed by the anesthesia team    Chief Complaint: Abdominal pain, epigastric    HPI:  Edward Ortiz Jr is a 79 y.o. male who is having an EGD for Abdominal pain, epigastric    PMH:   Past Medical History:   Diagnosis Date    Abdominal pain, epigastric     Bladder cancer (HCC) 02/24/2016    BPH with obstruction/lower urinary tract symptoms     Elevated prostate specific antigen (PSA)     Essential hypertension     Gastroesophageal reflux disease, unspecified whether esophagitis present     Hiatal hernia     Nodular prostate     PMR (polymyalgia rheumatica) (HCC)     Spinal stenosis        PSH:   Past Surgical History:   Procedure Laterality Date    COLONOSCOPY      OTHER SURGICAL HISTORY      MELANOMA EXCISION FROM THE NOSE    PROSTATE BIOPSY, NEEDLE, SATURATION SAMPLING      RETINAL DETACHMENT SURGERY Right 2010    UROLOGICAL SURGERY  09/19/2018    Transurethral resection of bladder tumor and removal of prostatic diverticular stones and instillation of mitomycin C. path: noninvasive low-grade papillary urothelial carcinoma. Dr. Lenz    UROLOGICAL SURGERY  02/24/2016    Cysto, TURBT. path: HIGH-GRADE PAPILLARY UROTHELIAL CARCINOMA. Dr. Lenz    UROLOGICAL SURGERY  04/20/2016    Transrectal ultrasound with prostate biopsy and repeat transurethral resection of bladder tumors. path benign. Dr. Lenz    UROLOGICAL SURGERY  08/03/2016    Cystoscopy, bladder tumor resection and fulguration of multiple bladder tumors followed by mitomycin-C instillation. path: COMPATIBLE WITH PAPILLARY UROTHELIAL NEOPLASM OF LOW MALIGNANT POTENTIAL (PUNLMP). Dr. Lenz    UROLOGICAL SURGERY  01/11/2017    Cysto, bladder bx. path benign. Dr. Lenz       Social HX:   Social  11.2 oz)   SpO2 97%   BMI 24.23 kg/m²     Physical Assessment:     constitutional: appearance: well developed, well nourished, normal habitus, no deformities, in no acute distress.   skin: inspection: no rashes, ulcers, icterus or other lesions; no clubbing or telangiectasias. palpation: no induration or subcutaneos nodules.   eyes: inspection: normal conjunctivae and lids; no jaundice pupils: normal  ENMT: mouth: normal oral mucosa,lips and gums; good dentition. oropharynx: normal tongue, hard and soft palate; posterior pharynx without erithema, exudate or lesions.   neck: thyroid: normal size, consistency and position; no masses or tenderness.   respiratory: effort: normal chest excursion; no intercostal retraction or accessory muscle use.   cardiovascular: abdominal aorta: normal size and position; no bruits. palpation: PMI of normal size and position; normal rhythm; no thrill or murmurs.   abdominal: abdomen: normal consistency; no tenderness or masses. hernias: no hernias appreciated. liver: normal size and consistency. spleen: not palpable.   rectal: hemoccult/guaiac: not performed.   musculoskeletal: digits and nails: no clubbing, cyanosis, petechiae or other inflammatory conditions. gait: normal gait and station head and neck: normal range of motion; no pain, crepitation or contracture. spine/ribs/pelvis: normal range of motion; no pain, deformity or contracture.   neurologic: cranial nerves: II-XII normal.   psychiatric: judgement/insight: within normal limits. memory: within normal limits for recent and remote events. mood and affect: no evidence of depression, anxiety or agitation. orientation: oriented to time, space and person.        Basic Metabolic Profile   No results for input(s): \"NA\", \"K\", \"CL\", \"CO2\", \"BUN\", \"GLU\", \"MG\", \"PHOS\" in the last 72 hours.    Invalid input(s): \"CREAT\", \"CA\"      CBC w/Diff    No results for input(s): \"WBC\", \"RBC\", \"HGB\", \"HCT\", \"MCV\", \"MCH\", \"MCHC\", \"RDW\", \"PLT\",

## 2024-07-11 NOTE — ED PROVIDER NOTES
Result Value Ref Range    WBC 10.4 4.6 - 13.2 K/uL    RBC 4.33 (L) 4.35 - 5.65 M/uL    Hemoglobin 13.3 13.0 - 16.0 g/dL    Hematocrit 41.4 36.0 - 48.0 %    MCV 95.6 78.0 - 100.0 FL    MCH 30.7 24.0 - 34.0 PG    MCHC 32.1 31.0 - 37.0 g/dL    RDW 13.8 11.6 - 14.5 %    Platelets 185 135 - 420 K/uL    MPV 11.7 9.2 - 11.8 FL    Nucleated RBCs 0.0 0  WBC    nRBC 0.00 0.00 - 0.01 K/uL    Neutrophils % 73 40 - 73 %    Lymphocytes % 16 (L) 21 - 52 %    Monocytes % 7 3 - 10 %    Eosinophils % 3 0 - 5 %    Basophils % 1 0 - 2 %    Immature Granulocytes % 0 0.0 - 0.5 %    Neutrophils Absolute 7.6 1.8 - 8.0 K/UL    Lymphocytes Absolute 1.7 0.9 - 3.6 K/UL    Monocytes Absolute 0.7 0.05 - 1.2 K/UL    Eosinophils Absolute 0.3 0.0 - 0.4 K/UL    Basophils Absolute 0.1 0.0 - 0.1 K/UL    Immature Granulocytes Absolute 0.0 0.00 - 0.04 K/UL    Differential Type AUTOMATED     BMP    Collection Time: 07/11/24  9:00 AM   Result Value Ref Range    Sodium 140 136 - 145 mmol/L    Potassium 4.0 3.5 - 5.5 mmol/L    Chloride 109 100 - 111 mmol/L    CO2 26 21 - 32 mmol/L    Anion Gap 5 3.0 - 18 mmol/L    Glucose 91 74 - 99 mg/dL    BUN 31 (H) 7.0 - 18 MG/DL    Creatinine 1.59 (H) 0.6 - 1.3 MG/DL    BUN/Creatinine Ratio 19 12 - 20      Est, Glom Filt Rate 44 (L) >60 ml/min/1.73m2    Calcium 9.9 8.5 - 10.1 MG/DL   Magnesium    Collection Time: 07/11/24  9:00 AM   Result Value Ref Range    Magnesium 2.4 1.6 - 2.6 mg/dL   TSH    Collection Time: 07/11/24  9:00 AM   Result Value Ref Range    TSH, 3rd Generation 1.01 0.36 - 3.74 uIU/mL   APTT    Collection Time: 07/11/24  9:00 AM   Result Value Ref Range    APTT 23.3 23.0 - 36.4 SEC      Labs Reviewed   CBC WITH AUTO DIFFERENTIAL - Abnormal; Notable for the following components:       Result Value    RBC 4.33 (*)     Lymphocytes % 16 (*)     All other components within normal limits   BASIC METABOLIC PANEL - Abnormal; Notable for the following components:    BUN 31 (*)     Creatinine 1.59 (*)   onset A-fib at this time.  Will start patient on heparin.  Will continue to monitor patient. [DV]   1036 Spoke hospitalist who agrees to admit patient at this time. [DV]      ED Course User Index  [DV] Alli Jaimes Jr., DO           Procedures:  Procedures      Rhythm interpretation from monitor: A-Fib      Social Determinants of Health: none       Supplemental Historians include: Patient,        Documentation/Prior Results Review:  Old medical records.  Previous electrocardiograms.  Nursing notes.      Discussion of Mangement with other Physicians, QHP or Appropriate Source:  Cardiology, Hospitalist    Critical Care Time:  The services I provided to this patient were to treat and/or prevent clinically significant deterioration that could result in the failure of one or more body systems and/or organ systems due to Cardiac dysrhythmia requiring emergent intervention    Services included the following:  -reviewing nursing notes and old charts  -vital sign assessments  -direct patient care  -medication orders and management  -interpreting and reviewing diagnostic studies/labs  -re-evaluations  -documentation time    Aggregate critical care time was 33 minutes, which includes only time during which I was engaged in work directly related to the patient's care as described above, whether I was at bedside or elsewhere in the Emergency Department. It did not include time spent performing other reported procedures or the services of residents, students, or nurses.    Alli Jaimes DO    10:41 AM        Diagnosis and Disposition     CLINICAL IMPRESSION:  1. Atrial fibrillation with rapid ventricular response (HCC)    2. Atrial fibrillation, unspecified type (HCC)    3. New onset atrial fibrillation (HCC)         Medication List        ASK your doctor about these medications      ezetimibe 10 MG tablet  Commonly known as: ZETIA     finasteride 5 MG tablet  Commonly known as: PROSCAR  Take 1 tablet by mouth daily      latanoprost 0.005 % ophthalmic solution  Commonly known as: XALATAN     lisinopril 5 MG tablet  Commonly known as: PRINIVIL;ZESTRIL     pantoprazole 40 MG tablet  Commonly known as: PROTONIX     predniSONE 2.5 MG tablet  Commonly known as: DELTASONE              Disposition: Admit    Patient condition at time of disposition: Stable        Dragon Disclaimer     Please note that this dictation was completed with [employer] (MCG), the computer voice recognition software.  Quite often unanticipated grammatical, syntax, homophones, and other interpretive errors are inadvertently transcribed by the computer software.  Please disregard these errors.  Please excuse any errors that have escaped final proofreading.      Alli Cano Jr., DO  07/11/24 1041

## 2024-07-11 NOTE — ED TRIAGE NOTES
Pt arrived to ED from endoscopy as a rapid response. Pt received propofol for procedure. New onset a.fib RVR. Pt denies SOB, chest pain, or any physical changes. No acute distress.

## 2024-07-11 NOTE — H&P
History and Physical    Patient: Edward Ortiz Jr MRN: [id number]  SSN: [id number]    YOB: 1945  Age: 79 y.o.  Sex: male      Subjective:      Edward Ortiz Jr is a 79 y.o. male who presents to [facility] ER with complaint of Irregular Heart Beat.  Patient reportedly arrived at [facility] today for Elective Esophagogastroduodenoscopy (a.k.a. EGD) with Biopsies to be performed by [facility] Gastroenterological services.  Patient was reportedly sent to [facility] ER after the procedure for Telemetry showing Atrial Fibrillation (a.k.a. A. Fib) with Rapid Ventricular Response (a.k.a. RVR).  Patient denies a history of Atrial Fibrillation or other arrhythmias; however, Patient reports that he had been having \"chest tightness\" episodically approximately one month ago, at other times, and then last Sunday.  Patient also occasionally had abdominal pain with these episodes.  Patient reports that his Father had Esophageal Cancer due to Gastroesophageal Reflux Disease (a.k.a. GERD) and has been placed on PPI agents and also had an Esophagogastroduodenoscopy (a.k.a. EGD) for this reason.  Patient has a history of Hypertension, but denies Diabetes Mellitus, Peripheral Arterial Disease (a.k.a. PAD)   Coronary Artery Disease (a.k.a. CAD)/ASCVD, Congestive Heart Failure (a.k.a. CHF)  Cerebrovascular Accident (a.k.a. CVA), Transient Ischemic Accident (a.k.a. TIA), Deep Vein Thrombosis (a.k.a. DVT), or Pulmonary Embolism (a.k.a. PE).  Patient reports that a chronic, intermittent diarrhea was the reason that Gastroenterological services was seen in the first place (which led to EGD being performed).  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  Patient reports taking Prednisone PRN for Polymyalgia Rheumatica.  Notably, Patient was told not to take Aspirin after the EGD, but that was likely standard protocol before results are relayed to Patient.  Patient denies fevers, chills, nausea, vomiting, dysuria, and

## 2024-07-11 NOTE — PROGRESS NOTES
INTERIM UPDATE - 1031 EST on 7/11/2024    [facility] ER Physician calls requesting admission for an 79-year-old male who presents to [facility] ER with complaint of Irregular Heart Beat.  Patient reportedly arrived at [facility] today for Elective Esophagogastroduodenoscopy (a.k.a. EGD) with Biopsies to be performed by [facility] Gastroenterological services.  Patient was reportedly sent to [facility] ER after the procedure for Telemetry showing Atrial Fibrillation (a.k.a. A. Fib) with Rapid Ventricular Response (a.k.a. RVR).  Patient reportedly denied a previous history of Atrial Fibrillation.    Chart Review shows no diagnosis of Atrial Fibrillation/Flutter on Urological/Orthopedic Office Notes.    [facility] ER Physician reports that Patient's Heart Rate was controlled with IV Metoprolol pushes x2.  Patient was started on IV Heparin gtt.  Patient was reported to be otherwise asymptomatic.    Cardiological services consulted.    Patient's Heart Rate is currently reported to be in the 90s bpm.    Plan:  Patient was placed on Observation on [facility] Telemetry Unit for management of New Onset Atrial Fibrillation with Rapid Ventricular Response (a.k.a. RVR).

## 2024-07-11 NOTE — ANESTHESIA PRE PROCEDURE
Department of Anesthesiology  Preprocedure Note       Name:  Edward Ortiz Jr   Age:  79 y.o.  :  1945                                          MRN:  [id number]         Date:  2024      Surgeon: Surgeon(s):  William Ramirez MD    Procedure: Procedure(s):  ESOPHAGOGASTRODUODENOSCOPY    Medications prior to admission:   Prior to Admission medications    Medication Sig Start Date End Date Taking? Authorizing Provider   pantoprazole (PROTONIX) 40 MG tablet Take 1 tablet by mouth daily   Yes ProviderVal MD   latanoprost (XALATAN) 0.005 % ophthalmic solution 1 drop nightly    ProviderVal MD   finasteride (PROSCAR) 5 MG tablet Take 1 tablet by mouth daily 23   Bernardo Max MD   ezetimibe (ZETIA) 10 MG tablet Take 1 tablet by mouth daily 23   Val Haley MD   lisinopril (PRINIVIL;ZESTRIL) 5 MG tablet Take by mouth daily    Automatic Reconciliation, Ar   predniSONE (DELTASONE) 2.5 MG tablet Take 1 tab by mouth daily (with food) as directed.  Patient not taking: Reported on 2024 10/11/21   Automatic Reconciliation, Ar       Current medications:    Current Facility-Administered Medications   Medication Dose Route Frequency Provider Last Rate Last Admin   • lidocaine PF 1 % injection 1 mL  1 mL IntraDERmal Once PRN Michael Link APRN - CRNA       • lactated ringers IV soln infusion   IntraVENous Continuous Michael Link APRN - CRNA 125 mL/hr at 24 0701 New Bag at 24 0701   • sodium chloride flush 0.9 % injection 5-40 mL  5-40 mL IntraVENous PRN Michael Link APRN - CRNA       • famotidine (PEPCID) 20 mg in sodium chloride (PF) 0.9 % 10 mL injection  20 mg IntraVENous Once Michael Link APRN - CRNA           Allergies:  No Known Allergies    Problem List:    Patient Active Problem List   Diagnosis Code   • Elevated prostate specific antigen (PSA) R97.20   • Bladder tumor D49.4   • Nodular prostate N40.2       Past Medical History:        Diagnosis Date

## 2024-07-11 NOTE — ED NOTES
TRANSFER - OUT REPORT:    Verbal report given to Samantha BLEVINS on Edward Ortiz Jr  being transferred to [facility] for routine progression of patient care       Report consisted of patient's Situation, Background, Assessment and   Recommendations(SBAR).     Information from the following report(s) ED Encounter Summary was reviewed with the receiving nurse.    [place] Assessment:    Presents to emergency department  because of falls (Syncope, seizure, or loss of consciousness): No  Age > 70: Yes  Altered Mental Status, Intoxication with alcohol or substance confusion (Disorientation, impaired judgment, poor safety awaremess, or inability to follow instructions): No  Impaired Mobility: Ambulates or transfers with assistive devices or assistance; Unable to ambulate or transer.: No  Nursing Judgement: No          Lines:   Peripheral IV 07/11/24 Left Wrist (Active)       Peripheral IV 07/11/24 Left Antecubital (Active)        Opportunity for questions and clarification was provided.      Patient transported with:  Registered Nurse

## 2024-07-11 NOTE — SIGNIFICANT EVENT
[facility]  Rapid Response Team Note      Patient:    Edward Ortiz Jr 79 y.o. male, : 1945  Patient MRN: [id number]    Admission Date: 2024   Admission Diagnosis: No admission diagnoses are documented for this encounter.      RAPID RESPONSE  Rapid response called to endoscopy suite for atrial fibrillation with RVR. Patient was coming out of anesthesia following outpatient upper endoscopy and was noted to be in sinus rhythm at that time. When hooked up to monitor in the recovery unit, found to have new onset asymptomatic, hemodynamically stable, afib RVR with a ventricular rate 120-150 bpm. PACU team attempted to resolve for 40 minutes prior to Rapid Response being called. Transported pt to the ED. Patient remained stable and asymptomatic for duration of the rapid.            Patient has no past medical history of heart disease    OBJECTIVE    RRT/MRT start time: 8:30 AM                RRT/MRT end time: 8:50 AM  Vitals:    24 0852   BP: (!) 144/93   Pulse:    Resp:    Temp:    SpO2:      VSS: BP systolic 120/60-70s, Pulse 120-150, Resp 18, Afebrile, SpO2 95%+ on RA     Physical Exam:  Gen: NAD, comfortable, obese   HEENT: normocephalic, atraumatic  CV: Irregularly irregular rhythm, S1/S2 present without M/R/G, tachycardic, +2 radial pulses, well-perfused, PMI not displaced  Pulm: CTAB, no wheezes, no crackles  MSK: no clubbing, no edema  Skin: warm, dry, intact, no rash  Neuro: CN II-XII grossly intact, no focal deficits appreciated   Psych: appropriate, alert, oriented x3    ASSESSMENT/PLAN AND DISPOSITION  Edward Ortiz Jr is a 79 y.o. year old male admitted for No admission diagnoses are documented for this encounter.  Rapid Response Team/ Medical Response Team Called For: Afib RVR    Medications Administered During Rapid: None    EKG: Atrial fibrillation RVR to 120-130, irregular rhythm    Labs: None    Imaging: None    Other interventions: Transport to ED    Medical Problem(s)    New  onset Atrial fibrillation with RVR  -No known prior cardiac history  -ECG consistent with above  -hemodynamically stable and asymptomatic    Plan:  -transported to the Emergency Department    Disposition: Emergency Department  Patient condition: guarded    Emergency Department taking over care     [facility] Senior resident, Dr. Mata PGY-3, Dr. Parada PGY-1, and Dr. Osorio PGY-1 were present during Rapid Response.    River Flores MD -PGY 1  [facility]  July 11, 2024 9:26 AM

## 2024-07-11 NOTE — CONSULTS
[facility] - Consult Note    Date of  Admission: 7/11/2024  8:49 AM   Primary Care Physician:  Rakesh Carter MD       Assessment:     Patient Active Problem List    Diagnosis Date Noted    Nodular prostate 06/14/2023    Bladder tumor 02/24/2016    Elevated prostate specific antigen (PSA)        IMPRESSION  New onset atrial fibrillation with rapid ventricular response  Hypertension -stage II pressure    PLAN  Patient received metoprolol IV injection  Heparin GTT for primary for prevention of stroke in context of RVR  Check 2D transthoracic echocardiogram evaluate for structural heart disease  Start diltiazem gtt., check TSH, telemetry monitoring  Recommend correct electrolytes, Potassium >4, Magnesium >2.  Eliquis 5 mg p.o. twice daily on discharge if okay with GI, risk and benefits discussed with the patient    Thank you for this consultation and for allowing us to participate in this patient's care.    Primary Cardiologist Dr. Yenni SPAIN Consult     History of Present Illness:     This is a 79 y.o. male PMH as above was due for evaluation endoscopy with abdominal pain received propofol for procedure warranted new onset atrial fibrillation rapid ventricular response.  Patient was waking up from procedure to gain knowledge of his atrial fibrillation.  Otherwise currently denies dyspnea orthopnea PND leg swelling weight gain palpitation syncopal episodes.     Review of Symptoms:  Except as stated above include:  Constitutional:  negative  Respiratory:  negative  Cardiovascular:  negative  Gastrointestinal: negative  Genitourinary:  negative  Musculoskeletal:  Negative  Neurological:  Negative  Dermatological:  Negative  Endocrinological: Negative  Psychological:  Negative    Pertinent items are noted in HPI.     Past Medical History:     Past Medical History:   Diagnosis Date    Abdominal pain, epigastric     Bladder cancer (HCC) 02/24/2016    BPH with obstruction/lower urinary tract symptoms      RADIOGRAPH    CLINICAL INDICATION/HISTORY: Provided with order -   CHEST PAIN  > Additional: None    COMPARISON: None    TECHNIQUE: Portable frontal view of the chest    _______________    FINDINGS:    SUPPORT DEVICES: None.    HEART AND MEDIASTINUM: Aorta is mildly tortuous. Cardiac silhouette is within normal range in size.    LUNGS AND PLEURAL SPACES: Lungs are mildly hyperinflated and clear. Pulmonary vessels are normal. Right costophrenic angle is minimally blunted, the left costophrenic angle is sharp. No pneumothorax.    BONY THORAX AND SOFT TISSUES: No acute osseous abnormality.    _______________    Impression  Clear lungs. Minimal right pleural effusion versus pleural thickening.    Signed By: Migel March MD on 6/5/2024 9:46 AM      Signed By: ARTURO MEDINA MD     July 11, 2024

## 2024-07-11 NOTE — BRIEF OP NOTE
[phone]    [facility]  [address] [place] [address]      Brief Procedure Note    Edward Ortiz Jr  1945  [id number]    Date of Procedure: 7/11/2024     Preoperative diagnosis: Change in bowel habits [R19.4]  Bloating [R14.0]  Abdominal pain, epigastric [R10.13]  Gastroesophageal reflux disease, unspecified whether esophagitis present [K21.9]    Postoperative diagnosis: Change in bowel habits [R19.4]  Bloating [R14.0]  Abdominal pain, epigastric [R10.13]  Gastroesophageal reflux disease, unspecified whether esophagitis present [K21.9]    Type of Anesthesia: MAC (Monitored anesthesia care)    Description of findings: same as post op dx    Procedure: Procedure(s):  ESOPHAGOGASTRODUODENOSCOPY w/Bx's    :  Dr. William Ramirez MD    Assistant(s): Circulator: Bj Iglesias RN; Sarah Gaffney RN; Benita Camarillo RN    Devices/implants/grafts/tissues/prosthesis: None    EBL:None    Specimens:   ID Type Source Tests Collected by Time Destination   A : Duodenum Biopsies Tissue Duodenum SURGICAL PATHOLOGY William Ramirez MD 7/11/2024 0739    B : Stomach (Body & Antrum) Biopsies Tissue Stomach SURGICAL PATHOLOGY William Ramirez MD 7/11/2024 0741    C : [place] E[place] Junction Biopsies Tissue GE Junction Biopsy SURGICAL PATHOLOGY William Ramirez MD 7/11/2024 0744    D : Esosphageal Biopsies (Lower 3rd/Middle 3rd) Tissue Esophagus SURGICAL PATHOLOGY William Ramirez MD 7/11/2024 0745        Findings: See printed and scanned procedure note    Complications: None    Dr. William Ramirez MD  7/11/2024  7:58 AM

## 2024-07-11 NOTE — CARE COORDINATION
07/11/24 1626   /Social Work Whiteboard Notes   /Social Work Whiteboard RED 07/11/2024 When medically stable patient dispo is home with spouse to transport. JACOBY Will BSW   Case Management

## 2024-07-11 NOTE — ED NOTES
Pt resting in bed, awake and alert. NAD. Dilt and heparin drip infusing without difficulty. Call bell within reach.

## 2024-07-12 ENCOUNTER — APPOINTMENT (OUTPATIENT)
Facility: HOSPITAL | Age: 79
DRG: 310 | End: 2024-07-12
Attending: HOSPITALIST
Payer: MEDICARE

## 2024-07-12 VITALS
HEART RATE: 68 BPM | OXYGEN SATURATION: 95 % | BODY MASS INDEX: 24.22 KG/M2 | WEIGHT: 173 LBS | TEMPERATURE: 97 F | SYSTOLIC BLOOD PRESSURE: 149 MMHG | RESPIRATION RATE: 18 BRPM | HEIGHT: 71 IN | DIASTOLIC BLOOD PRESSURE: 81 MMHG

## 2024-07-12 LAB
ALBUMIN SERPL-MCNC: 2.7 G/DL (ref 3.4–5)
ALBUMIN/GLOB SERPL: 0.7 (ref 0.8–1.7)
ALP SERPL-CCNC: 78 U/L (ref 45–117)
ALT SERPL-CCNC: 44 U/L (ref 16–61)
ANION GAP SERPL CALC-SCNC: 7 MMOL/L (ref 3–18)
APTT PPP: 70.4 SEC (ref 23–36.4)
APTT PPP: 70.8 SEC (ref 23–36.4)
AST SERPL-CCNC: 27 U/L (ref 10–38)
BASOPHILS # BLD: 0.1 K/UL (ref 0–0.1)
BASOPHILS NFR BLD: 1 % (ref 0–2)
BILIRUB SERPL-MCNC: 0.4 MG/DL (ref 0.2–1)
BUN SERPL-MCNC: 27 MG/DL (ref 7–18)
BUN/CREAT SERPL: 20 (ref 12–20)
CALCIUM SERPL-MCNC: 9.3 MG/DL (ref 8.5–10.1)
CHLORIDE SERPL-SCNC: 110 MMOL/L (ref 100–111)
CO2 SERPL-SCNC: 23 MMOL/L (ref 21–32)
CREAT SERPL-MCNC: 1.34 MG/DL (ref 0.6–1.3)
DIFFERENTIAL METHOD BLD: ABNORMAL
ECHO AO ASC DIAM: 3.1 CM
ECHO AO ASCENDING AORTA INDEX: 1.57 CM/M2
ECHO AO ROOT DIAM: 3.4 CM
ECHO AO ROOT INDEX: 1.72 CM/M2
ECHO AV AREA PEAK VELOCITY: 2.1 CM2
ECHO AV AREA VTI: 2.4 CM2
ECHO AV AREA/BSA PEAK VELOCITY: 1.1 CM2/M2
ECHO AV AREA/BSA VTI: 1.2 CM2/M2
ECHO AV MEAN GRADIENT: 4 MMHG
ECHO AV MEAN VELOCITY: 1 M/S
ECHO AV PEAK GRADIENT: 8 MMHG
ECHO AV PEAK VELOCITY: 1.4 M/S
ECHO AV VELOCITY RATIO: 0.71
ECHO AV VTI: 27 CM
ECHO BSA: 1.98 M2
ECHO EST RA PRESSURE: 3 MMHG
ECHO LA VOL A-L A2C: 46 ML (ref 18–58)
ECHO LA VOL A-L A4C: 53 ML (ref 18–58)
ECHO LA VOL MOD A2C: 43 ML (ref 18–58)
ECHO LA VOL MOD A4C: 48 ML (ref 18–58)
ECHO LA VOLUME AREA LENGTH: 52 ML
ECHO LA VOLUME INDEX A-L A2C: 23 ML/M2 (ref 16–34)
ECHO LA VOLUME INDEX A-L A4C: 27 ML/M2 (ref 16–34)
ECHO LA VOLUME INDEX AREA LENGTH: 26 ML/M2 (ref 16–34)
ECHO LA VOLUME INDEX MOD A2C: 22 ML/M2 (ref 16–34)
ECHO LA VOLUME INDEX MOD A4C: 24 ML/M2 (ref 16–34)
ECHO LV E' LATERAL VELOCITY: 13 CM/S
ECHO LV E' SEPTAL VELOCITY: 8 CM/S
ECHO LV EDV A2C: 85 ML
ECHO LV EDV A4C: 75 ML
ECHO LV EDV BP: 79 ML (ref 67–155)
ECHO LV EDV INDEX A4C: 38 ML/M2
ECHO LV EDV INDEX BP: 40 ML/M2
ECHO LV EDV NDEX A2C: 43 ML/M2
ECHO LV EJECTION FRACTION A2C: 70 %
ECHO LV EJECTION FRACTION A4C: 67 %
ECHO LV EJECTION FRACTION BIPLANE: 68 % (ref 55–100)
ECHO LV ESV A2C: 26 ML
ECHO LV ESV A4C: 24 ML
ECHO LV ESV BP: 25 ML (ref 22–58)
ECHO LV ESV INDEX A2C: 13 ML/M2
ECHO LV ESV INDEX A4C: 12 ML/M2
ECHO LV ESV INDEX BP: 13 ML/M2
ECHO LV FRACTIONAL SHORTENING: 40 % (ref 28–44)
ECHO LV INTERNAL DIMENSION DIASTOLE INDEX: 2.37 CM/M2
ECHO LV INTERNAL DIMENSION DIASTOLIC: 4.7 CM (ref 4.2–5.9)
ECHO LV INTERNAL DIMENSION SYSTOLIC INDEX: 1.41 CM/M2
ECHO LV INTERNAL DIMENSION SYSTOLIC: 2.8 CM
ECHO LV IVSD: 0.6 CM (ref 0.6–1)
ECHO LV MASS 2D: 103.1 G (ref 88–224)
ECHO LV MASS INDEX 2D: 52.1 G/M2 (ref 49–115)
ECHO LV POSTERIOR WALL DIASTOLIC: 0.8 CM (ref 0.6–1)
ECHO LV RELATIVE WALL THICKNESS RATIO: 0.34
ECHO LVOT AREA: 3.1 CM2
ECHO LVOT AV VTI INDEX: 0.77
ECHO LVOT DIAM: 2 CM
ECHO LVOT MEAN GRADIENT: 2 MMHG
ECHO LVOT PEAK GRADIENT: 4 MMHG
ECHO LVOT PEAK VELOCITY: 1 M/S
ECHO LVOT STROKE VOLUME INDEX: 32.8 ML/M2
ECHO LVOT SV: 65 ML
ECHO LVOT VTI: 20.7 CM
ECHO MV A VELOCITY: 0.45 M/S
ECHO MV E DECELERATION TIME (DT): 205.3 MS
ECHO MV E VELOCITY: 0.66 M/S
ECHO MV E/A RATIO: 1.47
ECHO MV E/E' LATERAL: 5.08
ECHO MV E/E' RATIO (AVERAGED): 6.66
ECHO MV E/E' SEPTAL: 8.25
ECHO PV MAX VELOCITY: 1 M/S
ECHO PV PEAK GRADIENT: 4 MMHG
ECHO RA AREA 4C: 14.4 CM2
ECHO RA END SYSTOLIC VOLUME APICAL 4 CHAMBER INDEX BSA: 16 ML/M2
ECHO RA VOLUME AREA LENGTH APICAL 4 CHAMBER: 34 ML
ECHO RA VOLUME: 32 ML
ECHO RIGHT VENTRICULAR SYSTOLIC PRESSURE (RVSP): 34 MMHG
ECHO RV BASAL DIMENSION: 3.8 CM
ECHO RV FREE WALL PEAK S': 10 CM/S
ECHO RV MID DIMENSION: 3.6 CM
ECHO RV TAPSE: 2 CM (ref 1.7–?)
ECHO RVOT PEAK GRADIENT: 3 MMHG
ECHO RVOT PEAK VELOCITY: 0.9 M/S
ECHO TV REGURGITANT MAX VELOCITY: 2.79 M/S
ECHO TV REGURGITANT PEAK GRADIENT: 31 MMHG
EOSINOPHIL # BLD: 0.3 K/UL (ref 0–0.4)
EOSINOPHIL NFR BLD: 4 % (ref 0–5)
ERYTHROCYTE [DISTWIDTH] IN BLOOD BY AUTOMATED COUNT: 13.9 % (ref 11.6–14.5)
GLOBULIN SER CALC-MCNC: 4 G/DL (ref 2–4)
GLUCOSE SERPL-MCNC: 93 MG/DL (ref 74–99)
HCT VFR BLD AUTO: 35.5 % (ref 36–48)
HGB BLD-MCNC: 11.8 G/DL (ref 13–16)
IMM GRANULOCYTES # BLD AUTO: 0.1 K/UL (ref 0–0.04)
IMM GRANULOCYTES NFR BLD AUTO: 1 % (ref 0–0.5)
LYMPHOCYTES # BLD: 2.5 K/UL (ref 0.9–3.6)
LYMPHOCYTES NFR BLD: 27 % (ref 21–52)
MCH RBC QN AUTO: 31.2 PG (ref 24–34)
MCHC RBC AUTO-ENTMCNC: 33.2 G/DL (ref 31–37)
MCV RBC AUTO: 93.9 FL (ref 78–100)
MONOCYTES # BLD: 0.9 K/UL (ref 0.05–1.2)
MONOCYTES NFR BLD: 9 % (ref 3–10)
NEUTS SEG # BLD: 5.6 K/UL (ref 1.8–8)
NEUTS SEG NFR BLD: 59 % (ref 40–73)
NRBC # BLD: 0 K/UL (ref 0–0.01)
NRBC BLD-RTO: 0 PER 100 WBC
PLATELET # BLD AUTO: 225 K/UL (ref 135–420)
PMV BLD AUTO: 10.9 FL (ref 9.2–11.8)
POTASSIUM SERPL-SCNC: 3.9 MMOL/L (ref 3.5–5.5)
PROT SERPL-MCNC: 6.7 G/DL (ref 6.4–8.2)
RBC # BLD AUTO: 3.78 M/UL (ref 4.35–5.65)
SODIUM SERPL-SCNC: 140 MMOL/L (ref 136–145)
WBC # BLD AUTO: 9.5 K/UL (ref 4.6–13.2)

## 2024-07-12 PROCEDURE — 94761 N-INVAS EAR/PLS OXIMETRY MLT: CPT

## 2024-07-12 PROCEDURE — 99239 HOSP IP/OBS DSCHRG MGMT >30: CPT | Performed by: HOSPITALIST

## 2024-07-12 PROCEDURE — 6370000000 HC RX 637 (ALT 250 FOR IP): Performed by: INTERNAL MEDICINE

## 2024-07-12 PROCEDURE — 2580000003 HC RX 258: Performed by: INTERNAL MEDICINE

## 2024-07-12 PROCEDURE — 93306 TTE W/DOPPLER COMPLETE: CPT | Performed by: INTERNAL MEDICINE

## 2024-07-12 PROCEDURE — 36415 COLL VENOUS BLD VENIPUNCTURE: CPT

## 2024-07-12 PROCEDURE — C8929 TTE W OR WO FOL WCON,DOPPLER: HCPCS

## 2024-07-12 PROCEDURE — 6360000002 HC RX W HCPCS: Performed by: STUDENT IN AN ORGANIZED HEALTH CARE EDUCATION/TRAINING PROGRAM

## 2024-07-12 PROCEDURE — 85730 THROMBOPLASTIN TIME PARTIAL: CPT

## 2024-07-12 PROCEDURE — 80053 COMPREHEN METABOLIC PANEL: CPT

## 2024-07-12 PROCEDURE — 85025 COMPLETE CBC W/AUTO DIFF WBC: CPT

## 2024-07-12 PROCEDURE — 6360000004 HC RX CONTRAST MEDICATION: Performed by: HOSPITALIST

## 2024-07-12 RX ADMIN — FINASTERIDE 5 MG: 5 TABLET, FILM COATED ORAL at 09:20

## 2024-07-12 RX ADMIN — SODIUM CHLORIDE, PRESERVATIVE FREE 10 ML: 5 INJECTION INTRAVENOUS at 09:22

## 2024-07-12 RX ADMIN — LISINOPRIL 5 MG: 10 TABLET ORAL at 09:19

## 2024-07-12 RX ADMIN — EZETIMIBE 10 MG: 10 TABLET ORAL at 09:19

## 2024-07-12 RX ADMIN — HEPARIN SODIUM 2000 UNITS: 1000 INJECTION INTRAVENOUS; SUBCUTANEOUS at 07:22

## 2024-07-12 RX ADMIN — HEPARIN SODIUM 16 UNITS/KG/HR: 10000 INJECTION, SOLUTION INTRAVENOUS at 13:05

## 2024-07-12 RX ADMIN — PERFLUTREN 2 ML: 6.52 INJECTION, SUSPENSION INTRAVENOUS at 14:59

## 2024-07-12 RX ADMIN — PANTOPRAZOLE SODIUM 40 MG: 40 TABLET, DELAYED RELEASE ORAL at 09:19

## 2024-07-12 ASSESSMENT — PAIN SCALES - GENERAL
PAINLEVEL_OUTOF10: 0
PAINLEVEL_OUTOF10: 0

## 2024-07-12 NOTE — PROGRESS NOTES
1930-Bedside shift change report given to Kaylynn RN (oncoming nurse) by Samantha RN (offgoing nurse). Report included the following information Nurse Handoff Report, Index, Adult Overview, Intake/Output, MAR, Cardiac Rhythm  , Alarm Parameters, Quality Measures, and Neuro Assessment.     2346-Heparin rate changed per protocol and orders at this time. Patient asleep comfortable with spouse at bedside. Will cont to monitor

## 2024-07-12 NOTE — PROGRESS NOTES
[facility] Hospitalist Group  Progress Note    Patient: Edward Ortiz Jr Age: 79 y.o. : 1945 MR#: [id number] SSN: [id number]  Date/Time: 2024    Subjective:     S/e at bedside, was taking prilosec for abdominal fullness. Having intermittent diarrhea, worsening in frequency and severity over the past several weeks. Denies recent falls, walks unassisted at home.       Wife at bedside.     Assessment/Plan:     1. New Onset Atrial Fibrillation with Rapid Ventricular Response (a.k.a. RVR)  - CHADS2 (), CHADS2-VASc (3/9)  Telemetry, IV Heparin gtt, IV Diltiazem gtt (will change this to a fixed-rate IV Diltiazem Infusion when heart rate is reportedly controlled).  PRN IV Metoprolol 5 mg for heart rate >125 bpm in atrial fibrillation/flutter (after IV Diltiazem rate is fixed).  Check Troponin.  Will consider ordering an Echocardiogram when Patient heart rate is controlled.  Cardiological services were consulted in [facility] ER by [facility] ER Physician.     Cardiological services has recommended Apixaban (a.k.a. Eliquis), so long as Gastroenterological services approves.     Hypertension  Continue home Lisinopril.     Polymyalgia Rheumatica,  HOLD home Prednisone, which Patient reports taking PRN.     Gastroesophageal Reflux Disease (a.k.a. GERD)  Continue home PPI therapy.     Benign Prostatic Hypertrophy (a.k.a. BPH)  Continue home Finasteride.     Spinal Stenosis  No current home medications for this issue.     History of Bladder Cancer  No current home medications for this issue.     DVT Prophylaxis  Therapeutic Anticoagulation is achieved with IV Heparin gtt    I discussed the case w ZEKE Herrera.       Additional Notes:      Case discussed with:  []patient  []family  []nursing  []case management   [] discussed on IDT.   DVT Prophylaxis:  []Lovenox  []Hep SQ  []SCDs  []Coumadin   []On Heparin gtt   [] noac    Objective:   VS: /75   Pulse 73   Temp 98.1 °F (36.7 °C) (Oral)   Resp 18    2.0 - 4.0 g/dL    Albumin/Globulin Ratio 0.7 (L) 0.8 - 1.7     CBC with Auto Differential    Collection Time: 07/12/24  6:06 AM   Result Value Ref Range    WBC 9.5 4.6 - 13.2 K/uL    RBC 3.78 (L) 4.35 - 5.65 M/uL    Hemoglobin 11.8 (L) 13.0 - 16.0 g/dL    Hematocrit 35.5 (L) 36.0 - 48.0 %    MCV 93.9 78.0 - 100.0 FL    MCH 31.2 24.0 - 34.0 PG    MCHC 33.2 31.0 - 37.0 g/dL    RDW 13.9 11.6 - 14.5 %    Platelets 225 135 - 420 K/uL    MPV 10.9 9.2 - 11.8 FL    Nucleated RBCs 0.0 0  WBC    nRBC 0.00 0.00 - 0.01 K/uL    Neutrophils % 59 40 - 73 %    Lymphocytes % 27 21 - 52 %    Monocytes % 9 3 - 10 %    Eosinophils % 4 0 - 5 %    Basophils % 1 0 - 2 %    Immature Granulocytes % 1 (H) 0.0 - 0.5 %    Neutrophils Absolute 5.6 1.8 - 8.0 K/UL    Lymphocytes Absolute 2.5 0.9 - 3.6 K/UL    Monocytes Absolute 0.9 0.05 - 1.2 K/UL    Eosinophils Absolute 0.3 0.0 - 0.4 K/UL    Basophils Absolute 0.1 0.0 - 0.1 K/UL    Immature Granulocytes Absolute 0.1 (H) 0.00 - 0.04 K/UL    Differential Type AUTOMATED     APTT    Collection Time: 07/12/24  6:06 AM   Result Value Ref Range    APTT 70.4 (H) 23.0 - 36.4 SEC     Additional Data Reviewed:      Signed By: Shannon Shaw MD     July 12, 2024 9:48 AM

## 2024-07-12 NOTE — PROGRESS NOTES
Physical Therapy  PT order received and chart reviewed. Pt sitting up in recliner when therapist entered room. Caregiver at bedside. Pt denied any concerns regarding safe mobility and stated at being at PLOF. Will complete order at this time. Thank you.    Venkata Adams, PT, DPT

## 2024-07-12 NOTE — CARE COORDINATION
07/12/24 1126   /Social Work Whiteboard Notes   /Social Work Whiteboard RED 07/12/2024 Patient dispo is home with spouse to transport. JACOBY Will BSW   Case Management

## 2024-07-12 NOTE — DISCHARGE SUMMARY
Discharge Summary    Patient: Edward Ortiz Jr               Sex: male          DOA: 7/11/2024         YOB: 1945      Age:  79 y.o.        LOS:  LOS: 1 day                Admit Date: 7/11/2024    Discharge Date: 7/12/2024    Admission Diagnoses: New onset atrial fibrillation (HCC) [I48.91]  Atrial fibrillation with rapid ventricular response (HCC) [I48.91]  Atrial fibrillation, unspecified type (HCC) [I48.91]    Reason for Admission:    79 y.o. male PMH as above was due for evaluation endoscopy with abdominal pain received propofol for procedure warranted new onset atrial fibrillation rapid ventricular response.  Patient was waking up from procedure to gain knowledge of his atrial fibrillation.  Otherwise currently denies dyspnea orthopnea PND leg swelling weight gain palpitation syncopal episodes.     Discharge Condition:  good    Hospital Course:  New Onset Atrial Fibrillation with Rapid Ventricular Response   - CHADS2 (2/6), CHADS2-VASc (3/9)  Telemetry, IV Heparin gtt, IV Diltiazem gtt   TSH WNL.  Echo noted.  Low-dose metoprolol, Eliquis.  Outpatient follow-up with cardiology has been arranged.  Cardiology input appreciated.        Hypertension  Continue home Lisinopril. Low dose metoprolol added, see #1.      Polymyalgia Rheumatica,  Resume home Prednisone prn.      Gastroesophageal Reflux Disease  resume home PPI therapy.     Benign Prostatic Hypertrophy  Continue home Finasteride.     Spinal Stenosis  No current home medications for this issue.     History of Bladder Cancer  No current home medications for this issue.     DVT Prophylaxis  Was given in the form of IV Heparin gtt     I discussed the case w ZEKE Herrera, and with Dr. Hyman.     Consults:    Treatment Team: Attending Provider: Shannon Shaw MD; Consulting Physician: Marquis Hyman MD; Occupational Therapist: Margie Begum OT; Physical Therapist: Venkata Garcia PT; Registered Nurse: Robin Kelley RN; Utilization Reviewer:

## 2024-07-12 NOTE — DISCHARGE INSTRUCTIONS
DISCHARGE SUMMARY from Nurse    PATIENT INSTRUCTIONS:    After general anesthesia or intravenous sedation, for 24 hours or while taking prescription Narcotics:  Limit your activities  Do not drive and operate hazardous machinery  Do not make important personal or business decisions  Do  not drink alcoholic beverages  If you have not urinated within 8 hours after discharge, please contact your surgeon on call.    Report the following to your surgeon:  Excessive pain, swelling, redness or odor of or around the surgical area  Temperature over 100.5  Nausea and vomiting lasting longer than 4 hours or if unable to take medications  Any signs of decreased circulation or nerve impairment to extremity: change in color, persistent  numbness, tingling, coldness or increase pain  Any questions    What to do at Home:  Recommended activity: activity as tolerated, rest as needed    If you experience any of the following symptoms chest pain, shortness of breath, dizziness/ fainting, nausea and or vomiting  please follow up with Primary Care Provider or go to the nearest Emergency Room.    *  Please give a list of your current medications to your Primary Care Provider.    *  Please update this list whenever your medications are discontinued, doses are      changed, or new medications (including over-the-counter products) are added.    *  Please carry medication information at all times in case of emergency situations.    These are general instructions for a healthy lifestyle:    No smoking/ No tobacco products/ Avoid exposure to second hand smoke  Surgeon General's Warning:  Quitting smoking now greatly reduces serious risk to your health.    Obesity, smoking, and sedentary lifestyle greatly increases your risk for illness    A healthy diet, regular physical exercise & weight monitoring are important for maintaining a healthy lifestyle    You may be retaining fluid if you have a history of heart failure or if you experience any

## 2024-07-12 NOTE — PROGRESS NOTES
[email or web address]  [phone]    GASTROENTEROLOGY BRIEF NOTE     Impression/Plan:  Edward Ortiz Jr is a 79 y.o. male w/ PMH HTN presented to the ED 7/11 after EGD to due to new onset a. Fib noted in PACU. EGD revealed HH, gastric bezoar, gastritis, and irregular Z line possibly representative of Yeung's esophagus; path pending. No IP needs for GI at this time. Continue medical management per primary.       Javier Mane PA-C.   08/26/23, 11:42 AM   [facility]  [email or web address]  Phone: [phone]

## 2024-07-12 NOTE — PROGRESS NOTES
[facility] Hospitalist Group  Progress Note    Patient: Edward Ortiz Jr Age: 79 y.o. : 1945 MR#: [id number] SSN: [id number]  Date/Time: 2024    Subjective:     S/e at bedside, was taking prilosec for abdominal fullness. Having intermittent diarrhea, worsening in frequency and severity over the past several weeks. Denies recent falls, walks unassisted at home.       Wife at bedside.     Assessment/Plan:     1. New Onset Atrial Fibrillation with Rapid Ventricular Response (a.k.a. RVR)  - CHADS2 (), CHADS2-VASc (3/9)  Telemetry, IV Heparin gtt, IV Diltiazem gtt (will change this to a fixed-rate IV Diltiazem Infusion when heart rate is reportedly controlled).  PRN IV Metoprolol 5 mg for heart rate >125 bpm in atrial fibrillation/flutter (after IV Diltiazem rate is fixed).  Check Troponin.  Will consider ordering an Echocardiogram when Patient heart rate is controlled.  Cardiological services were consulted in [facility] ER by [facility] ER Physician.     Cardiological services has recommended Apixaban (a.k.a. Eliquis), so long as Gastroenterological services approves.     Hypertension  Continue home Lisinopril.     Polymyalgia Rheumatica,  HOLD home Prednisone, which Patient reports taking PRN.     Gastroesophageal Reflux Disease (a.k.a. GERD)  Continue home PPI therapy.     Benign Prostatic Hypertrophy (a.k.a. BPH)  Continue home Finasteride.     Spinal Stenosis  No current home medications for this issue.     History of Bladder Cancer  No current home medications for this issue.     DVT Prophylaxis  Therapeutic Anticoagulation is achieved with IV Heparin gtt    I discussed the case w ZEKE Herrera.       Additional Notes:      Case discussed with:  []patient  []family  []nursing  []case management   [] discussed on IDT.   DVT Prophylaxis:  []Lovenox  []Hep SQ  []SCDs  []Coumadin   []On Heparin gtt   [] noac    Objective:   VS: /75   Pulse 73   Temp 98.1 °F (36.7 °C) (Oral)   Resp 18

## 2024-07-12 NOTE — PROGRESS NOTES
Cardiology Progress Note    Admit Date: 7/11/2024  Attending Cardiologist: Dr. Hyman    IMPRESSION  New onset atrial fibrillation with rapid ventricular response, now in normal sinus rhythm, 2D echo 7/12/2024 preserved ejection fraction 60 to 65%  Hypertension - normotensive to stage II pressure     PLAN  Continue with Eliquis 5 mg p.o. twice daily for primary prevention of stroke risk and benefits explained wishes to proceed.  Continue with metoprolol tartrate 25 mg p.o. twice daily  Please have patient followup with me in the office in 4 weeks.  Signing off patient at this time.  Please call for questions.     Thank you for this consultation and for allowing us to participate in this patient's care.     Primary Cardiologist JUDIT, Dr. Hyman Consult    Subjective:     Denies chest pain  Denies shortness of breath  Denies abdominal pain    Objective:      Patient Vitals for the past 8 hrs:   Temp Pulse BP SpO2   07/12/24 0430 97.5 °F (36.4 °C) 72 128/78 97 %         Patient Vitals for the past 96 hrs:   Weight   07/11/24 0848 78.5 kg (173 lb)           Intake/Output Summary (Last 24 hours) at 7/12/2024 0825  Last data filed at 7/12/2024 0733  Gross per 24 hour   Intake 846.81 ml   Output 100 ml   Net 746.81 ml       EXAMINATION:  General:         Alert, cooperative, no distress  Head:  Normocephalic, without obvious abnormality, atraumatic.  Eyes:   Conjunctivae/corneas clear  Lungs:            Clear to auscultation bilaterally, no wheezes, no rales, no rhonchi  Heart: Regular rhythm normal rate, S1, S2 normal, no murmur, click, rub or gallop.  Abdomen:      Soft, non-tender. Bowel sounds normal.   Extremities:   Extremities normal, no edema  Skin:    No rashes or lesions visible extremities  Neurologic:     Normal strength, sensation    Visit Vitals  /78   Pulse 72   Temp 97.5 °F (36.4 °C) (Oral)   Resp 18   Ht 1.803 m (5' 11\")   Wt 78.5 kg (173 lb)   SpO2 97%   BMI 24.13 kg/m²       Data Review:     Labs:

## 2024-07-12 NOTE — PROGRESS NOTES
conducted an initial consultation and Spiritual Assessment for Edward Ortiz Jr, who is a 79 y.o.,male. Patient’s Primary Language is: English.   According to the patient’s EMR Jewish Affiliation is: Presbyterian.     The reason the Patient came to the hospital is:   Patient Active Problem List    Diagnosis Date Noted    New onset atrial fibrillation (HCC) 07/11/2024    Atrial fibrillation with rapid ventricular response (HCC) 07/11/2024    Hypertension 07/11/2024    BPH (benign prostatic hyperplasia) 07/11/2024    GERD (gastroesophageal reflux disease) 07/11/2024    PMR (polymyalgia rheumatica) (HCC) 07/11/2024    Spinal stenosis 07/11/2024    History of bladder cancer 07/11/2024    Nodular prostate 06/14/2023    Bladder tumor 02/24/2016    Elevated prostate specific antigen (PSA)         The  provided the following Interventions:  Initiated a relationship of care and support with patient in bed 548 where he and wife both spoke highly of the care that they have received here during this stay. Patient has been here about 24 hours.  Explored issues of curly, belief, spirituality and Nondenominational/ritual needs while hospitalized. Patient shared that he is active in a local [employer] where he hs been for many years.  Offered prayer on patients behalf.       The following outcomes were achieved:  Patient shared limited information about his medical narrative and spiritual journey/beliefs.  Patient processed feeling about current hospitalization.  Patient expressed gratitude for pastoral care visit.    Assessment:  Patient does not have any Nondenominational/cultural needs that will affect patient’s preferences in health care.  There are no further spiritual or Nondenominational issues which require Spiritual Care Services interventions at this time.       Plan:  Chaplains will continue to follow and will provide pastoral care on an as needed/requested basis    .anil Wolfe   Board Certified   Has The Growth Been Previously Biopsied?: has been previously biopsied previous_biopsy_has_been_previously_biopsied

## 2024-07-12 NOTE — PROGRESS NOTES
4 Eyes Skin Assessment     NAME:  Edward Ortiz Jr  YOB: 1945  MEDICAL RECORD NUMBER:  [id number]    The patient is being assessed for  Shift Handoff    I agree that at least one RN has performed a thorough Head to Toe Skin Assessment on the patient. ALL assessment sites listed below have been assessed.      Areas assessed by both nurses:    Shoulders, Back, Chest, Sacrum. Buttock, Coccyx, Ischium, Legs. Feet and Heels, and Under Medical Devices         Does the Patient have a Wound? No noted wound(s)       Wu Prevention initiated by RN: No  Wound Care Orders initiated by RN: No    Pressure Injury (Stage 3,4, Unstageable, DTI, NWPT, and Complex wounds) if present, place Wound referral order by RN under : No    New Ostomies, if present place, Ostomy referral order under : No     Nurse 1 eSignature: Electronically signed by Kaylynn Marcus RN on 7/12/24 at 4:17 AM EDT    **SHARE this note so that the co-signing nurse can place an eSignature**    Nurse 2 eSignature: {Esignature:[id number]}

## 2024-07-12 NOTE — PROGRESS NOTES
OT orders received and medical chart review completed.   Pt reports he is at baseline as independent with ADLs and functional mobility w/o AD. Formal OT evaluation not indicated at this time. OT to sign off.

## 2024-07-14 PROBLEM — R19.8 ABNORMAL FINDINGS ON ESOPHAGOGASTRODUODENOSCOPY (EGD): Status: ACTIVE | Noted: 2024-07-14

## 2024-07-15 NOTE — CARE COORDINATION
TRINIDAD received call from Oliva in case management requesting TRINIDAD to contact Dr. Shaw to follow up with patient request for a new cardiologist referral. TRINIDAD spoke with Dr. Shaw about patients request for new cardiologist. Per Dr Shaw, she will look into this matter and get back with TRINIDAD.    Jessica Will BSW   Case Management

## 2024-07-24 NOTE — PROGRESS NOTES
Physician Progress Note      PATIENT:               ROWAN NGUYEN JR  [facility] #:                  [id number]  :                       1945  ADMIT DATE:       2024 8:49 AM  DISCH DATE:        2024 5:58 PM  RESPONDING  PROVIDER #:        Shannon Oconnor MD          QUERY TEXT:    Good Morning    This patient admitted for new onset of Af-b.  The patient had RRT called while in the Endoscopy suite , having EGD and noted   with new  A-fib      If possible, please document in progress notes and discharge summary the   relationship if any between the New onset A-fib and the procedure  ?  The medical record reflects the following:  Risk Factors: Age (79)- , Male, HTN, Anesthesia for EGD  Clinical Indicators: Pt having EGD, RRT called noted with A-fib with RVR pt   was coming out of anesthesia and noted to have sinus rhythm. When hooked up to   the monitor found to have new onset A-fib was asymptomatic, and   hemodynamically stable. -150s  Treatment: Transferred to ER for admission, Cardiology consulted, Placed  on   IV metoprolol, Heparin drip, 2D echo, Started on Eliquis.    Thank you  NIRMAL Beverly,RN, CPHQ, CCDS, SMART  Options provided:  -- New onset of Atrial Fibrillation  is due to the procedure  -- Atrial fibrillation is not due to the procedure, but is due to other   incidental risk factor, Please specify other incidental risk factor.  -- Other - I will add my own diagnosis  -- Disagree - Not applicable / Not valid  -- Disagree - Clinically unable to determine / Unknown  -- Refer to Clinical Documentation Reviewer    PROVIDER RESPONSE TEXT:    Atrial Fibrillation is not due to the procedure but due to    Query created by: Cindy Rodríguez on 2024 10:50 AM      QUERY TEXT:    Good Afternoon    This patient admitted on 2024-2024- for Atrial Fibrillation.    The patient was started on Eliquis this admission.    If possible, please document in progress notes and discharge summary if

## 2024-07-26 ENCOUNTER — OFFICE VISIT (OUTPATIENT)
Age: 79
End: 2024-07-26
Payer: MEDICARE

## 2024-07-26 VITALS
HEART RATE: 51 BPM | OXYGEN SATURATION: 100 % | HEIGHT: 71 IN | WEIGHT: 174 LBS | SYSTOLIC BLOOD PRESSURE: 151 MMHG | DIASTOLIC BLOOD PRESSURE: 81 MMHG | BODY MASS INDEX: 24.36 KG/M2

## 2024-07-26 DIAGNOSIS — M35.3 PMR (POLYMYALGIA RHEUMATICA) (HCC): Chronic | ICD-10-CM

## 2024-07-26 DIAGNOSIS — I10 HYPERTENSION, UNSPECIFIED TYPE: ICD-10-CM

## 2024-07-26 DIAGNOSIS — I48.91 NEW ONSET ATRIAL FIBRILLATION (HCC): Primary | ICD-10-CM

## 2024-07-26 DIAGNOSIS — K21.9 GASTROESOPHAGEAL REFLUX DISEASE WITHOUT ESOPHAGITIS: Chronic | ICD-10-CM

## 2024-07-26 DIAGNOSIS — Z79.01 ANTICOAGULANT LONG-TERM USE: ICD-10-CM

## 2024-07-26 PROCEDURE — 93000 ELECTROCARDIOGRAM COMPLETE: CPT | Performed by: NURSE PRACTITIONER

## 2024-07-26 PROCEDURE — 99214 OFFICE O/P EST MOD 30 MIN: CPT | Performed by: NURSE PRACTITIONER

## 2024-07-26 PROCEDURE — 3077F SYST BP >= 140 MM HG: CPT | Performed by: NURSE PRACTITIONER

## 2024-07-26 PROCEDURE — 1123F ACP DISCUSS/DSCN MKR DOCD: CPT | Performed by: NURSE PRACTITIONER

## 2024-07-26 PROCEDURE — 3079F DIAST BP 80-89 MM HG: CPT | Performed by: NURSE PRACTITIONER

## 2024-07-26 ASSESSMENT — ENCOUNTER SYMPTOMS
ABDOMINAL DISTENTION: 0
CHEST TIGHTNESS: 0
ABDOMINAL PAIN: 0
WHEEZING: 0
SHORTNESS OF BREATH: 0
BACK PAIN: 0
NAUSEA: 0
COUGH: 0
APNEA: 0

## 2024-07-26 NOTE — PROGRESS NOTES
1. Have you been to the ER, urgent care clinic since your last visit?  Hospitalized since your last visit?     Yes [facility] for surgery    2. Have you seen or consulted any other health care providers outside of the [facility] since your last visit?  Include any pap smears or colon screening.      no

## 2024-07-26 NOTE — PROGRESS NOTES
HISTORY OF PRESENT ILLNESS    Edward Ortiz Jr is a 79 y.o. male.    7/26/2024  Patient seen following recent admission for new onset A-fib.  Patient was asymptomatic he was started on metoprolol and Eliquis and discharged to home.  Denies chest pain shortness of breath palpitations or edema.        Family History   Problem Relation Age of Onset    Cancer Father     Hypertension Mother        Past Medical History:   Diagnosis Date    Abdominal pain, epigastric     Bladder cancer (HCC) 02/24/2016    BPH with obstruction/lower urinary tract symptoms     Elevated prostate specific antigen (PSA)     Essential hypertension     Gastroesophageal reflux disease, unspecified whether esophagitis present     Hiatal hernia     Nodular prostate     PMR (polymyalgia rheumatica) (HCC)     Spinal stenosis        Past Surgical History:   Procedure Laterality Date    COLONOSCOPY      OTHER SURGICAL HISTORY      MELANOMA EXCISION FROM THE NOSE    PROSTATE BIOPSY, NEEDLE, SATURATION SAMPLING      RETINAL DETACHMENT SURGERY Right 2010    UPPER GASTROINTESTINAL ENDOSCOPY N/A 7/11/2024    ESOPHAGOGASTRODUODENOSCOPY w/Bx's performed by William Ramirez MD at [facility] ENDOSCOPY    UROLOGICAL SURGERY  09/19/2018    Transurethral resection of bladder tumor and removal of prostatic diverticular stones and instillation of mitomycin C. path: noninvasive low-grade papillary urothelial carcinoma. Dr. Lenz    UROLOGICAL SURGERY  02/24/2016    Cysto, TURBT. path: HIGH-GRADE PAPILLARY UROTHELIAL CARCINOMA. Dr. Lenz    UROLOGICAL SURGERY  04/20/2016    Transrectal ultrasound with prostate biopsy and repeat transurethral resection of bladder tumors. path benign. Dr. Lenz    UROLOGICAL SURGERY  08/03/2016    Cystoscopy, bladder tumor resection and fulguration of multiple bladder tumors followed by mitomycin-C instillation. path: COMPATIBLE WITH PAPILLARY UROTHELIAL NEOPLASM OF LOW MALIGNANT POTENTIAL (PUNLMP). Dr. Lenz    UROLOGICAL SURGERY

## 2024-10-30 ENCOUNTER — OFFICE VISIT (OUTPATIENT)
Age: 79
End: 2024-10-30
Payer: MEDICARE

## 2024-10-30 VITALS
BODY MASS INDEX: 25.48 KG/M2 | OXYGEN SATURATION: 94 % | HEART RATE: 54 BPM | DIASTOLIC BLOOD PRESSURE: 74 MMHG | SYSTOLIC BLOOD PRESSURE: 138 MMHG | HEIGHT: 71 IN | WEIGHT: 182 LBS

## 2024-10-30 DIAGNOSIS — I48.0 PAF (PAROXYSMAL ATRIAL FIBRILLATION) (HCC): Primary | ICD-10-CM

## 2024-10-30 DIAGNOSIS — M35.3 PMR (POLYMYALGIA RHEUMATICA) (HCC): ICD-10-CM

## 2024-10-30 DIAGNOSIS — I10 HYPERTENSION, UNSPECIFIED TYPE: ICD-10-CM

## 2024-10-30 DIAGNOSIS — Z79.01 ANTICOAGULANT LONG-TERM USE: ICD-10-CM

## 2024-10-30 PROCEDURE — 3075F SYST BP GE 130 - 139MM HG: CPT | Performed by: NURSE PRACTITIONER

## 2024-10-30 PROCEDURE — 1123F ACP DISCUSS/DSCN MKR DOCD: CPT | Performed by: NURSE PRACTITIONER

## 2024-10-30 PROCEDURE — 99214 OFFICE O/P EST MOD 30 MIN: CPT | Performed by: NURSE PRACTITIONER

## 2024-10-30 PROCEDURE — 3078F DIAST BP <80 MM HG: CPT | Performed by: NURSE PRACTITIONER

## 2024-10-30 ASSESSMENT — ENCOUNTER SYMPTOMS
ABDOMINAL DISTENTION: 0
CHEST TIGHTNESS: 0
APNEA: 0
COUGH: 0
NAUSEA: 0
BACK PAIN: 0
SHORTNESS OF BREATH: 0
WHEEZING: 0
ABDOMINAL PAIN: 0

## 2024-10-30 ASSESSMENT — PATIENT HEALTH QUESTIONNAIRE - PHQ9
2. FEELING DOWN, DEPRESSED OR HOPELESS: NOT AT ALL
1. LITTLE INTEREST OR PLEASURE IN DOING THINGS: NOT AT ALL
SUM OF ALL RESPONSES TO PHQ QUESTIONS 1-9: 0
SUM OF ALL RESPONSES TO PHQ9 QUESTIONS 1 & 2: 0
SUM OF ALL RESPONSES TO PHQ QUESTIONS 1-9: 0

## 2024-10-30 NOTE — PROGRESS NOTES
1. Have you been to the ER, urgent care clinic since your last visit?  Hospitalized since your last visit?     no    2. Have you seen or consulted any other health care providers outside of the [facility] since your last visit?  Include any pap smears or colon screening.      Yes pcp

## 2024-10-30 NOTE — PROGRESS NOTES
HISTORY OF PRESENT ILLNESS    Edward Ortiz Jr is a 79 y.o. male.    7/26/2024  Patient seen following recent admission for new onset A-fib.  Patient was asymptomatic he was started on metoprolol and Eliquis and discharged to home.  Denies chest pain shortness of breath palpitations or edema.  10/30/2024  Patient with history of paroxysmal A-fib seen in follow-up denies chest pain shortness of breath palpitations or edema.        Family History   Problem Relation Age of Onset    Cancer Father     Hypertension Mother        Past Medical History:   Diagnosis Date    Abdominal pain, epigastric     Bladder cancer (HCC) 02/24/2016    BPH with obstruction/lower urinary tract symptoms     Elevated prostate specific antigen (PSA)     Essential hypertension     Gastroesophageal reflux disease, unspecified whether esophagitis present     Hiatal hernia     Nodular prostate     PMR (polymyalgia rheumatica) (HCC)     Spinal stenosis        Past Surgical History:   Procedure Laterality Date    COLONOSCOPY      OTHER SURGICAL HISTORY      MELANOMA EXCISION FROM THE NOSE    PROSTATE BIOPSY, NEEDLE, SATURATION SAMPLING      RETINAL DETACHMENT SURGERY Right 2010    UPPER GASTROINTESTINAL ENDOSCOPY N/A 7/11/2024    ESOPHAGOGASTRODUODENOSCOPY w/Bx's performed by William [facility] MD at [facility] ENDOSCOPY    UROLOGICAL SURGERY  09/19/2018    Transurethral resection of bladder tumor and removal of prostatic diverticular stones and instillation of mitomycin C. path: noninvasive low-grade papillary urothelial carcinoma. Dr. Lenz    UROLOGICAL SURGERY  02/24/2016    Cysto, TURBT. path: HIGH-GRADE PAPILLARY UROTHELIAL CARCINOMA. Dr. Lenz    UROLOGICAL SURGERY  04/20/2016    Transrectal ultrasound with prostate biopsy and repeat transurethral resection of bladder tumors. path benign. Dr. Lenz    UROLOGICAL SURGERY  08/03/2016    Cystoscopy, bladder tumor resection and fulguration of multiple bladder tumors followed by mitomycin-C

## 2024-12-03 RX ORDER — METOPROLOL TARTRATE 25 MG/1
25 TABLET, FILM COATED ORAL 2 TIMES DAILY
Qty: 180 TABLET | Refills: 1 | Status: SHIPPED | OUTPATIENT
Start: 2024-12-03

## 2025-07-29 NOTE — OP NOTES
Blood pressure not at goal!  -discontinued amlodipine for LE swelling in favor of starting losartan 25 mg  -follow-up within 3 months for BP check    dictated

## (undated) DEVICE — EVACUATOR URO BLDR W/ ADPT UROVAC

## (undated) DEVICE — SYRINGE MED 50ML LUERSLIP TIP

## (undated) DEVICE — MEDI-VAC NON-CONDUCTIVE SUCTION TUBING: Brand: CARDINAL HEALTH

## (undated) DEVICE — LINER SUCT CANSTR 3000CC PLAS SFT PRE ASSEMB W/OUT TBNG W/

## (undated) DEVICE — SOLUTION IRRIG 3000ML 0.9% SOD CHL FLX CONT 0797208] ICU MEDICAL INC]

## (undated) DEVICE — 3M™ BAIR PAWS FLEX™ WARMING GOWN, STANDARD, 20 PER CASE 81003: Brand: BAIR PAWS™

## (undated) DEVICE — LUB SURG MEDC STRL 2OZ TUBE MC -- MEDICHOICE

## (undated) DEVICE — Y-TYPE TUR/BLADDER IRRIGATION SET, REGULATING CLAMP

## (undated) DEVICE — KENDALL SCD EXPRESS SLEEVES, KNEE LENGTH, MEDIUM: Brand: KENDALL SCD

## (undated) DEVICE — BASIN EMSIS 16OZ GRAPHITE PLAS KID SHP MOLD GRAD FOR ORAL

## (undated) DEVICE — TRAY PREP DRY W/ PREM GLV 2 APPL 6 SPNG 2 UNDPD 1 OVERWRAP

## (undated) DEVICE — STERILE POLYISOPRENE POWDER-FREE SURGICAL GLOVES: Brand: PROTEXIS

## (undated) DEVICE — BAG DRAIN URIN 2000ML LF STRL -- CONVERT TO ITEM 363123

## (undated) DEVICE — URINARY LEG BAG COMBINATION PACK: Brand: HOLLISTER

## (undated) DEVICE — GAUZE,SPONGE,4"X4",16PLY,STRL,LF,10/TRAY: Brand: MEDLINE

## (undated) DEVICE — Device: Brand: OLYMPUS

## (undated) DEVICE — BAG DRAINAGE CUST DISP

## (undated) DEVICE — CATHETER F BLLN 5CC 18FR 2 W HYDRGEL COAT LESS TRAUM LUB

## (undated) DEVICE — SKIN MARKER,REGULAR TIP WITH RULER AND LABELS: Brand: DEVON

## (undated) DEVICE — (D)LUB GEL SURG SURGLUB 2OZ -- DISC BY MFR USE ITEM 292507

## (undated) DEVICE — SOLUTION IV 1000ML 0.9% SOD CHL

## (undated) DEVICE — KIT CLN UP BON SECOURS MARYV

## (undated) DEVICE — GAUZE SPONGES,16 PLY: Brand: CURITY

## (undated) DEVICE — STER SINGLE BASIN SET W/BOWLS: Brand: CARDINAL HEALTH

## (undated) DEVICE — UNDERPAD INCONT W23XL36IN STD BLU POLYPR BK FLUF SFT

## (undated) DEVICE — SYR 10ML LUER LOK 1/5ML GRAD --

## (undated) DEVICE — GOWN,PREVENTION PLUS,XLN/XL,ST,24/CS: Brand: MEDLINE

## (undated) DEVICE — CANNULA NSL AD TBNG L14FT STD PVC O2 CRV CONN NONFLARED NSL

## (undated) DEVICE — ENDOSCOPY PUMP TUBING/ CAP SET: Brand: ERBE

## (undated) DEVICE — SOLUTION IRRIG 1000ML H2O STRL BLT

## (undated) DEVICE — 4-PORT MANIFOLD: Brand: NEPTUNE 2

## (undated) DEVICE — BITE BLOCK ENDOSCP UNIV AD 6 TO 9.4 MM

## (undated) DEVICE — CATHETER SUCT TR FL TIP 14FR W/ O CTRL

## (undated) DEVICE — SYRINGE MED 25GA 3ML L5/8IN SUBQ PLAS W/ DETACH NDL SFTY

## (undated) DEVICE — SYR IRR CATH TIP LR ADPT 70ML -- CONVERT TO ITEM 363120

## (undated) DEVICE — Z DUP USE 2565107 PACK SURG PROC LEG CYSTO T-DRAPE REINF TBL CVR HND TWL

## (undated) DEVICE — (D)SYR 10ML 1/5ML GRAD NSAF -- PKGING CHANGE USE ITEM 338027

## (undated) DEVICE — FORCEPS BX L240CM JAW DIA2.4MM ORNG L CAP W/ NDL DISP RAD

## (undated) DEVICE — YANKAUER,SMOOTH HANDLE,HIGH CAPACITY: Brand: MEDLINE INDUSTRIES, INC.